# Patient Record
Sex: FEMALE | Race: WHITE | NOT HISPANIC OR LATINO | Employment: FULL TIME | URBAN - METROPOLITAN AREA
[De-identification: names, ages, dates, MRNs, and addresses within clinical notes are randomized per-mention and may not be internally consistent; named-entity substitution may affect disease eponyms.]

---

## 2017-10-09 ENCOUNTER — OFFICE VISIT (OUTPATIENT)
Dept: MEDICAL GROUP | Facility: MEDICAL CENTER | Age: 47
End: 2017-10-09
Payer: COMMERCIAL

## 2017-10-09 VITALS
SYSTOLIC BLOOD PRESSURE: 112 MMHG | WEIGHT: 131 LBS | TEMPERATURE: 98 F | DIASTOLIC BLOOD PRESSURE: 82 MMHG | HEIGHT: 67 IN | RESPIRATION RATE: 16 BRPM | OXYGEN SATURATION: 97 % | HEART RATE: 68 BPM | BODY MASS INDEX: 20.56 KG/M2

## 2017-10-09 DIAGNOSIS — F33.0 MAJOR DEPRESSIVE DISORDER, RECURRENT EPISODE, MILD (HCC): ICD-10-CM

## 2017-10-09 DIAGNOSIS — Z00.00 HEALTH CARE MAINTENANCE: ICD-10-CM

## 2017-10-09 DIAGNOSIS — F41.1 GAD (GENERALIZED ANXIETY DISORDER): ICD-10-CM

## 2017-10-09 DIAGNOSIS — Z12.39 SCREENING FOR MALIGNANT NEOPLASM OF BREAST: ICD-10-CM

## 2017-10-09 PROCEDURE — 99214 OFFICE O/P EST MOD 30 MIN: CPT | Performed by: INTERNAL MEDICINE

## 2017-10-09 RX ORDER — PAROXETINE 10 MG/1
20 TABLET, FILM COATED ORAL DAILY
Qty: 180 TAB | Refills: 0 | Status: SHIPPED | OUTPATIENT
Start: 2017-10-09 | End: 2018-05-10 | Stop reason: SDUPTHER

## 2017-10-09 ASSESSMENT — PATIENT HEALTH QUESTIONNAIRE - PHQ9
5. POOR APPETITE OR OVEREATING: 0 - NOT AT ALL
SUM OF ALL RESPONSES TO PHQ QUESTIONS 1-9: 10
CLINICAL INTERPRETATION OF PHQ2 SCORE: 2
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
## 2017-10-24 ENCOUNTER — APPOINTMENT (OUTPATIENT)
Dept: RADIOLOGY | Facility: IMAGING CENTER | Age: 47
End: 2017-10-24
Attending: NURSE PRACTITIONER
Payer: COMMERCIAL

## 2017-10-24 ENCOUNTER — OFFICE VISIT (OUTPATIENT)
Dept: URGENT CARE | Facility: CLINIC | Age: 47
End: 2017-10-24
Payer: COMMERCIAL

## 2017-10-24 VITALS
HEART RATE: 74 BPM | TEMPERATURE: 97.9 F | DIASTOLIC BLOOD PRESSURE: 72 MMHG | OXYGEN SATURATION: 96 % | BODY MASS INDEX: 20.56 KG/M2 | HEIGHT: 67 IN | SYSTOLIC BLOOD PRESSURE: 106 MMHG | WEIGHT: 131 LBS | RESPIRATION RATE: 16 BRPM

## 2017-10-24 DIAGNOSIS — S39.012A ACUTE MYOFASCIAL STRAIN OF LUMBAR REGION, INITIAL ENCOUNTER: ICD-10-CM

## 2017-10-24 PROCEDURE — 99213 OFFICE O/P EST LOW 20 MIN: CPT | Performed by: NURSE PRACTITIONER

## 2017-10-24 PROCEDURE — 72100 X-RAY EXAM L-S SPINE 2/3 VWS: CPT | Mod: TC | Performed by: NURSE PRACTITIONER

## 2017-10-24 RX ORDER — CYCLOBENZAPRINE HCL 10 MG
10 TABLET ORAL 3 TIMES DAILY PRN
Qty: 30 TAB | Refills: 0 | Status: SHIPPED | OUTPATIENT
Start: 2017-10-24 | End: 2017-11-17

## 2017-10-24 RX ORDER — KETOROLAC TROMETHAMINE 10 MG/1
10 TABLET, FILM COATED ORAL EVERY 6 HOURS PRN
Qty: 20 TAB | Refills: 0 | Status: SHIPPED | OUTPATIENT
Start: 2017-10-24 | End: 2017-11-17

## 2017-10-24 RX ORDER — HYDROCODONE BITARTRATE AND ACETAMINOPHEN 5; 325 MG/1; MG/1
1-2 TABLET ORAL EVERY 6 HOURS PRN
Qty: 20 TAB | Refills: 0 | Status: SHIPPED | OUTPATIENT
Start: 2017-10-24 | End: 2017-11-17

## 2017-10-24 RX ORDER — KETOROLAC TROMETHAMINE 30 MG/ML
60 INJECTION, SOLUTION INTRAMUSCULAR; INTRAVENOUS ONCE
Status: COMPLETED | OUTPATIENT
Start: 2017-10-24 | End: 2017-10-24

## 2017-10-24 RX ADMIN — KETOROLAC TROMETHAMINE 60 MG: 30 INJECTION, SOLUTION INTRAMUSCULAR; INTRAVENOUS at 13:38

## 2017-10-24 ASSESSMENT — ENCOUNTER SYMPTOMS: BACK PAIN: 1

## 2017-10-24 NOTE — LETTER
October 24, 2017    To Whom It May Concern:         This is confirmation that Rachel Jacobs attended her scheduled appointment with Cathey J Hamman, A.P.N. on 10/24/17.          If you have any questions please do not hesitate to call me at the phone number listed below.    Sincerely,          Vickie Kearns, Med Ass't  [phone]
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
## 2017-11-17 ENCOUNTER — HOSPITAL ENCOUNTER (OUTPATIENT)
Dept: LAB | Facility: MEDICAL CENTER | Age: 47
End: 2017-11-17
Attending: PHYSICIAN ASSISTANT
Payer: COMMERCIAL

## 2017-11-17 ENCOUNTER — OFFICE VISIT (OUTPATIENT)
Dept: MEDICAL GROUP | Facility: MEDICAL CENTER | Age: 47
End: 2017-11-17
Payer: COMMERCIAL

## 2017-11-17 VITALS
SYSTOLIC BLOOD PRESSURE: 90 MMHG | TEMPERATURE: 98.4 F | HEART RATE: 77 BPM | BODY MASS INDEX: 20.56 KG/M2 | OXYGEN SATURATION: 97 % | HEIGHT: 67 IN | WEIGHT: 131 LBS | DIASTOLIC BLOOD PRESSURE: 62 MMHG

## 2017-11-17 DIAGNOSIS — I95.9 HYPOTENSION, UNSPECIFIED HYPOTENSION TYPE: ICD-10-CM

## 2017-11-17 LAB
ALBUMIN SERPL BCP-MCNC: 4.3 G/DL (ref 3.2–4.9)
ALBUMIN/GLOB SERPL: 1.6 G/DL
ALP SERPL-CCNC: 81 U/L (ref 30–99)
ALT SERPL-CCNC: 15 U/L (ref 2–50)
ANION GAP SERPL CALC-SCNC: 6 MMOL/L (ref 0–11.9)
AST SERPL-CCNC: 17 U/L (ref 12–45)
BASOPHILS # BLD AUTO: 1.1 % (ref 0–1.8)
BASOPHILS # BLD: 0.08 K/UL (ref 0–0.12)
BILIRUB SERPL-MCNC: 0.9 MG/DL (ref 0.1–1.5)
BUN SERPL-MCNC: 14 MG/DL (ref 8–22)
CALCIUM SERPL-MCNC: 9.3 MG/DL (ref 8.5–10.5)
CHLORIDE SERPL-SCNC: 105 MMOL/L (ref 96–112)
CO2 SERPL-SCNC: 27 MMOL/L (ref 20–33)
CREAT SERPL-MCNC: 0.67 MG/DL (ref 0.5–1.4)
EOSINOPHIL # BLD AUTO: 0.24 K/UL (ref 0–0.51)
EOSINOPHIL NFR BLD: 3.2 % (ref 0–6.9)
ERYTHROCYTE [DISTWIDTH] IN BLOOD BY AUTOMATED COUNT: 40.2 FL (ref 35.9–50)
GFR SERPL CREATININE-BSD FRML MDRD: >60 ML/MIN/1.73 M 2
GLOBULIN SER CALC-MCNC: 2.7 G/DL (ref 1.9–3.5)
GLUCOSE SERPL-MCNC: 88 MG/DL (ref 65–99)
HCT VFR BLD AUTO: 42 % (ref 37–47)
HGB BLD-MCNC: 14 G/DL (ref 12–16)
IMM GRANULOCYTES # BLD AUTO: 0.01 K/UL (ref 0–0.11)
IMM GRANULOCYTES NFR BLD AUTO: 0.1 % (ref 0–0.9)
LYMPHOCYTES # BLD AUTO: 3.03 K/UL (ref 1–4.8)
LYMPHOCYTES NFR BLD: 40.4 % (ref 22–41)
MCH RBC QN AUTO: 31.9 PG (ref 27–33)
MCHC RBC AUTO-ENTMCNC: 33.3 G/DL (ref 33.6–35)
MCV RBC AUTO: 95.7 FL (ref 81.4–97.8)
MONOCYTES # BLD AUTO: 0.4 K/UL (ref 0–0.85)
MONOCYTES NFR BLD AUTO: 5.3 % (ref 0–13.4)
NEUTROPHILS # BLD AUTO: 3.74 K/UL (ref 2–7.15)
NEUTROPHILS NFR BLD: 49.9 % (ref 44–72)
NRBC # BLD AUTO: 0 K/UL
NRBC BLD AUTO-RTO: 0 /100 WBC
PLATELET # BLD AUTO: 226 K/UL (ref 164–446)
PMV BLD AUTO: 10.7 FL (ref 9–12.9)
POTASSIUM SERPL-SCNC: 3.7 MMOL/L (ref 3.6–5.5)
PROT SERPL-MCNC: 7 G/DL (ref 6–8.2)
RBC # BLD AUTO: 4.39 M/UL (ref 4.2–5.4)
SODIUM SERPL-SCNC: 138 MMOL/L (ref 135–145)
TSH SERPL DL<=0.005 MIU/L-ACNC: 1.25 UIU/ML (ref 0.3–3.7)
WBC # BLD AUTO: 7.5 K/UL (ref 4.8–10.8)

## 2017-11-17 PROCEDURE — 80053 COMPREHEN METABOLIC PANEL: CPT

## 2017-11-17 PROCEDURE — 99214 OFFICE O/P EST MOD 30 MIN: CPT | Performed by: PHYSICIAN ASSISTANT

## 2017-11-17 PROCEDURE — 36415 COLL VENOUS BLD VENIPUNCTURE: CPT

## 2017-11-17 PROCEDURE — 84443 ASSAY THYROID STIM HORMONE: CPT

## 2017-11-17 PROCEDURE — 85025 COMPLETE CBC W/AUTO DIFF WBC: CPT

## 2017-11-17 ASSESSMENT — PATIENT HEALTH QUESTIONNAIRE - PHQ9: CLINICAL INTERPRETATION OF PHQ2 SCORE: 0

## 2017-11-17 NOTE — PROGRESS NOTES
"Subjective:   Rachel Jacobs is a 47 y.o. female here today for hypotensive symptoms over the past few weeks.    Hypotension  This is a 47-year-old female complains of a couple week history of feeling lightheaded dizzy with fatigue. She states that symptoms are secondary to her blood pressure being low. States that she has passed out 3 times in the past 2-3 weeks. Denies any blood loss. Denies any rectal bleeding. No vaginal bleeding. Her chart has a history of macrocytosis without anemia. She does smoke but has cut back on smoking. Yesterday developed some chest pain with a cough. Denies fevers. Denies any significant nasal congestion or drainage. States when she gets up from a seated position she is very slow and methodical. Symptoms began she believes after starting Paxil. She took the dose for a little while then bumped it up to 20 mg daily. Medication has been effective for her hot flashes. She states she does have significant sweating of her palms.       Current medicines (including changes today)  Current Outpatient Prescriptions   Medication Sig Dispense Refill   • paroxetine (PAXIL) 10 MG Tab Take 2 Tabs by mouth every day. 180 Tab 0     No current facility-administered medications for this visit.      She  has a past medical history of Depression; Endometriosis; and Macrocytosis (3/2014).    ROS   No chest pain, no shortness of breath, no abdominal pain and all other systems were reviewed and are negative.       Objective:     Blood pressure (!) 90/62, pulse 77, temperature 36.9 °C (98.4 °F), height 1.702 m (5' 7\"), weight 59.4 kg (131 lb), last menstrual period 09/27/2008, SpO2 97 %, not currently breastfeeding. Body mass index is 20.52 kg/m².   Physical Exam:  Constitutional: Alert, no distress.  Skin: Warm, dry, good turgor, no rashes in visible areas.  Eye: Equal, round and reactive, conjunctiva clear, lids normal.  ENMT: Lips without lesions, good dentition, oropharynx clear.  Neck: Trachea " midline, no masses.   Lymph: No cervical or supraclavicular lymphadenopathy  Respiratory: Unlabored respiratory effort, lungs clear to auscultation, no wheezes, no ronchi.  Cardiovascular: Normal S1, S2, no murmur, no edema.  Abdomen: Soft, non-tender, no masses.  Psych: Alert and oriented x3, normal affect and mood.    Orthostatic blood pressure readings with sitting at 98/62. Supine 110/70 and standing 100/78.    Unable to obtain an EKG because it was down.    Assessment and Plan:   The following treatment plan was discussed    1. Hypotension, unspecified hypotension type  Acute, new onset condition. Cut Paxil to 10 mg for 3-5 days and if still symptomatic decreased to 5 mg for a week. Go to ED if has another episode of syncope. Ordered labs to be done today.  - CBC WITH DIFFERENTIAL; Future  - COMP METABOLIC PANEL; Future  - TSH; Future  - EKG - Clinic performed - machine is not functioning      Followup: Return if symptoms worsen or fail to improve.    Please note that this dictation was created using voice recognition software. I have made every reasonable attempt to correct obvious errors, but I expect that there are errors of grammar and possibly content that I did not discover before finalizing the note.

## 2017-11-17 NOTE — ASSESSMENT & PLAN NOTE
This is a 47-year-old female complains of a couple week history of feeling lightheaded dizzy with fatigue. She states that symptoms are secondary to her blood pressure being low. States that she has passed out 3 times in the past 2-3 weeks. Denies any blood loss. Denies any rectal bleeding. No vaginal bleeding. Her chart has a history of macrocytosis without anemia. She does smoke but has cut back on smoking. Yesterday developed some chest pain with a cough. Denies fevers. Denies any significant nasal congestion or drainage. States when she gets up from a seated position she is very slow and methodical. Symptoms began she believes after starting Paxil. She took the dose for a little while then bumped it up to 20 mg daily. Medication has been effective for her hot flashes. She states she does have significant sweating of her palms.

## 2017-11-20 ENCOUNTER — TELEPHONE (OUTPATIENT)
Dept: MEDICAL GROUP | Facility: MEDICAL CENTER | Age: 47
End: 2017-11-20

## 2017-11-27 ENCOUNTER — OFFICE VISIT (OUTPATIENT)
Dept: MEDICAL GROUP | Facility: MEDICAL CENTER | Age: 47
End: 2017-11-27
Payer: COMMERCIAL

## 2017-11-27 VITALS
WEIGHT: 131 LBS | DIASTOLIC BLOOD PRESSURE: 64 MMHG | BODY MASS INDEX: 20.56 KG/M2 | TEMPERATURE: 98.7 F | SYSTOLIC BLOOD PRESSURE: 102 MMHG | HEIGHT: 67 IN | HEART RATE: 68 BPM | OXYGEN SATURATION: 97 %

## 2017-11-27 DIAGNOSIS — F41.1 GAD (GENERALIZED ANXIETY DISORDER): ICD-10-CM

## 2017-11-27 DIAGNOSIS — Z00.00 HEALTH CARE MAINTENANCE: ICD-10-CM

## 2017-11-27 DIAGNOSIS — F33.1 MAJOR DEPRESSIVE DISORDER, RECURRENT EPISODE, MODERATE (HCC): ICD-10-CM

## 2017-11-27 PROCEDURE — 99214 OFFICE O/P EST MOD 30 MIN: CPT | Performed by: INTERNAL MEDICINE

## 2017-11-27 RX ORDER — SERTRALINE HYDROCHLORIDE 100 MG/1
150 TABLET, FILM COATED ORAL DAILY
Qty: 135 TAB | Refills: 0 | Status: SHIPPED | OUTPATIENT
Start: 2017-11-27 | End: 2017-11-27

## 2017-11-27 RX ORDER — SERTRALINE HYDROCHLORIDE 100 MG/1
100 TABLET, FILM COATED ORAL DAILY
Qty: 90 TAB | Refills: 0 | Status: SHIPPED | OUTPATIENT
Start: 2017-11-27 | End: 2018-05-22

## 2017-11-27 ASSESSMENT — PATIENT HEALTH QUESTIONNAIRE - PHQ9
3. TROUBLE FALLING OR STAYING ASLEEP OR SLEEPING TOO MUCH: 2
6. FEELING BAD ABOUT YOURSELF - OR THAT YOU ARE A FAILURE OR HAVE LET YOURSELF OR YOUR FAMILY DOWN: 1
5. POOR APPETITE OR OVEREATING: 0 - NOT AT ALL
7. TROUBLE CONCENTRATING ON THINGS, SUCH AS READING THE NEWSPAPER OR WATCHING TELEVISION: 1
4. FEELING TIRED OR HAVING LITTLE ENERGY: 2
2. FEELING DOWN, DEPRESSED, IRRITABLE, OR HOPELESS: 1
SUM OF ALL RESPONSES TO PHQ QUESTIONS 1-9: 11
SUM OF ALL RESPONSES TO PHQ QUESTIONS 1-9: 9
8. MOVING OR SPEAKING SO SLOWLY THAT OTHER PEOPLE COULD HAVE NOTICED. OR THE OPPOSITE, BEING SO FIGETY OR RESTLESS THAT YOU HAVE BEEN MOVING AROUND A LOT MORE THAN USUAL: 0
5. POOR APPETITE OR OVEREATING: 0
9. THOUGHTS THAT YOU WOULD BE BETTER OFF DEAD, OR OF HURTING YOURSELF: 1
1. LITTLE INTEREST OR PLEASURE IN DOING THINGS: 1
SUM OF ALL RESPONSES TO PHQ9 QUESTIONS 1 AND 2: 2
CLINICAL INTERPRETATION OF PHQ2 SCORE: 3
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
## 2018-05-10 DIAGNOSIS — F33.0 MAJOR DEPRESSIVE DISORDER, RECURRENT EPISODE, MILD (HCC): ICD-10-CM

## 2018-05-10 RX ORDER — PAROXETINE 10 MG/1
20 TABLET, FILM COATED ORAL DAILY
Qty: 60 TAB | Refills: 0 | Status: SHIPPED | OUTPATIENT
Start: 2018-05-10 | End: 2018-05-22

## 2018-05-22 ENCOUNTER — OFFICE VISIT (OUTPATIENT)
Dept: URGENT CARE | Facility: PHYSICIAN GROUP | Age: 48
End: 2018-05-22
Payer: COMMERCIAL

## 2018-05-22 ENCOUNTER — HOSPITAL ENCOUNTER (OUTPATIENT)
Dept: RADIOLOGY | Facility: MEDICAL CENTER | Age: 48
End: 2018-05-22
Attending: PHYSICIAN ASSISTANT
Payer: COMMERCIAL

## 2018-05-22 VITALS
HEART RATE: 71 BPM | SYSTOLIC BLOOD PRESSURE: 96 MMHG | WEIGHT: 127 LBS | TEMPERATURE: 98.6 F | DIASTOLIC BLOOD PRESSURE: 62 MMHG | BODY MASS INDEX: 19.93 KG/M2 | OXYGEN SATURATION: 95 % | HEIGHT: 67 IN

## 2018-05-22 DIAGNOSIS — Z87.442 HISTORY OF NEPHROLITHIASIS: ICD-10-CM

## 2018-05-22 DIAGNOSIS — R10.32 LLQ ABDOMINAL PAIN: ICD-10-CM

## 2018-05-22 DIAGNOSIS — J18.9 PNEUMONIA OF RIGHT LOWER LOBE DUE TO INFECTIOUS ORGANISM: ICD-10-CM

## 2018-05-22 DIAGNOSIS — R31.9 HEMATURIA, UNSPECIFIED TYPE: ICD-10-CM

## 2018-05-22 LAB
APPEARANCE UR: CLEAR
BILIRUB UR STRIP-MCNC: NEGATIVE MG/DL
COLOR UR AUTO: NORMAL
GLUCOSE UR STRIP.AUTO-MCNC: NEGATIVE MG/DL
KETONES UR STRIP.AUTO-MCNC: 40 MG/DL
LEUKOCYTE ESTERASE UR QL STRIP.AUTO: NEGATIVE
NITRITE UR QL STRIP.AUTO: NEGATIVE
PH UR STRIP.AUTO: 6 [PH] (ref 5–8)
PROT UR QL STRIP: NORMAL MG/DL
RBC UR QL AUTO: NORMAL
SP GR UR STRIP.AUTO: 1.02
UROBILINOGEN UR STRIP-MCNC: 0.2 MG/DL

## 2018-05-22 PROCEDURE — 74176 CT ABD & PELVIS W/O CONTRAST: CPT

## 2018-05-22 PROCEDURE — 99214 OFFICE O/P EST MOD 30 MIN: CPT | Performed by: PHYSICIAN ASSISTANT

## 2018-05-22 PROCEDURE — 81002 URINALYSIS NONAUTO W/O SCOPE: CPT | Performed by: PHYSICIAN ASSISTANT

## 2018-05-22 RX ORDER — KETOROLAC TROMETHAMINE 30 MG/ML
60 INJECTION, SOLUTION INTRAMUSCULAR; INTRAVENOUS ONCE
Status: COMPLETED | OUTPATIENT
Start: 2018-05-22 | End: 2018-05-22

## 2018-05-22 RX ORDER — DEXTROMETHORPHAN HYDROBROMIDE AND PROMETHAZINE HYDROCHLORIDE 15; 6.25 MG/5ML; MG/5ML
5 SYRUP ORAL
Qty: 100 ML | Refills: 0 | Status: SHIPPED | OUTPATIENT
Start: 2018-05-22 | End: 2018-05-24

## 2018-05-22 RX ORDER — LEVOFLOXACIN 500 MG/1
500 TABLET, FILM COATED ORAL DAILY
Qty: 10 TAB | Refills: 0 | Status: SHIPPED | OUTPATIENT
Start: 2018-05-22 | End: 2018-05-24

## 2018-05-22 RX ADMIN — KETOROLAC TROMETHAMINE 60 MG: 30 INJECTION, SOLUTION INTRAMUSCULAR; INTRAVENOUS at 13:12

## 2018-05-22 NOTE — LETTER
May 22, 2018         Patient: Rachel Jacobs   YOB: 1970   Date of Visit: 5/22/2018           To Whom it May Concern:    Rachel Jacobs was seen in my clinic on 5/22/2018. Please excuse her from work for the dates of 5/22, 5/23 and 5/24 Returning on 5/25.    If you have any questions or concerns, please don't hesitate to call.        Sincerely,           Annabelle Moran P.A.-C.  Electronically Signed

## 2018-05-23 NOTE — PROGRESS NOTES
Chief Complaint   Patient presents with   • LLQ Pain     Frrequent Urination, back pain, nausea, fever x3 days       HISTORY OF PRESENT ILLNESS: Patient is a 48 y.o. female who presents today for 3 days of generally feeling unwell with associated nausea, fevers up to 101 measured at home but also some LLQ abdominal discomfort and back pain, noted urinary frequency.  Patient has not noticed any hematuria but does note hx of kidney stones and concern for recurrence.   She has not vomited but does have some nausea.   She does not know when last BM was, possibly feeling constipated she states.   No hx of diverticulitis and has not had a colonoscopy before.  Hx of hysterectomy.   Some coughing but feels that her most persistent symptom is abdominal discomfort.     Patient Active Problem List    Diagnosis Date Noted   • Hypotension 11/17/2017   • Tobacco use 03/31/2016   • Macrocytosis without anemia 09/17/2015   • Insomnia 06/11/2015   • History of nephrolithiasis 06/11/2015       Allergies:Pcn [penicillins]    Current Outpatient Prescriptions Ordered in [facility]   Medication Sig Dispense Refill   • levoFLOXacin (LEVAQUIN) 500 MG tablet Take 1 Tab by mouth every day. 10 Tab 0   • promethazine-dextromethorphan (PROMETHAZINE-DM) 6.25-15 MG/5ML syrup Take 5 mL by mouth after meals and at bedtime as needed for Cough. 100 mL 0     No current Epic-ordered facility-administered medications on file.        Past Medical History:   Diagnosis Date   • Depression    • Endometriosis    • Macrocytosis 3/2014       Social History   Substance Use Topics   • Smoking status: Current Every Day Smoker     Packs/day: 0.10     Types: Cigarettes   • Smokeless tobacco: Never Used   • Alcohol use 0.6 oz/week     1 Standard drinks or equivalent per week      Comment: occas       Family Status   Relation Status   • Mother Alive   • Father Other   • Neg Hx      Family History   Problem Relation Age of Onset   • Diabetes Mother    • Allergies Mother   "  • Hypertension Mother    • Hyperlipidemia Mother    • Other Father      unknown   • Heart Disease Neg Hx    • Psychiatry Neg Hx    • Stroke Neg Hx    • Thyroid Neg Hx        ROS:  Review of Systems   Constitutional: SEE HPI  HENT: Negative for ear pain, nosebleeds, congestion, sore throat and neck pain.    Eyes: Negative for blurred vision.   Respiratory: Dry cough without sputum production, shortness of breath and wheezing.    Cardiovascular: Negative for chest pain, palpitations, orthopnea and leg swelling.   Gastrointestinal: SEE HPI  Genitourinary: SEE HPI  All other systems reviewed and are negative.       Exam:  Blood pressure (!) 96/62, pulse 71, temperature 37 °C (98.6 °F), height 1.702 m (5' 7\"), weight 57.6 kg (127 lb), last menstrual period 09/27/2008, SpO2 95 %.  General:  Well nourished, well developed female in NAD however appears uncomfortable. Prefers to lay with heating pad.   Eyes: PERRLA, EOM within normal limits, no conjunctival injection, no scleral icterus, visual fields and acuity grossly intact.  Ears: Normal shape and symmetry, no tenderness, no discharge. External canals are without any significant edema or erythema. Tympanic membranes are without any inflammation, no effusion. Gross auditory acuity is intact  Nose: Symmetrical, sinuses without tenderness, no discharge.   Mouth: reasonable hygiene, no erythema exudates or tonsillar enlargement.  Neck: no masses, range of motion within normal limits, no tracheal deviation. No lymphadenopathy  Pulmonary: Normal respiratory effort, no wheezes, crackles, or rhonchi.  Cardiovascular: regular rate and rhythm without murmurs, rubs, or gallops.  Abdomen: Soft, mild left side TTP (mid to lower) nondistended. Normal bowel sounds. No hepatosplenomegaly or masses, or hernias. No rebound or guarding.  No CVA tenderness.   Skin: No visible rashes or lesion. Warm, pink, dry.   Extremities: no clubbing, cyanosis, or edema.  Neuro: A&O x 3. Speech " normal/clear.  Normal gait.     RAD    Impression         1. Airspace opacity in the right lower lobe, in keeping with pneumonia.    2. Nonobstructive bilateral intrarenal calculi. No ureteral calculus. No hydronephrosis. Duplication of the left collecting system.   Reading Provider Reading Date   Zackery Amin M.D. May 22, 2018     Component Results     Component Value Ref Range & Units Status   POC Color Dark Yellow  Negative Final   POC Appearance Clear  Negative Final   POC Leukocyte Esterase Negative  Negative Final   POC Nitrites Negative  Negative Final   POC Urobiligen 0.2  Negative (0.2) mg/dL Final   POC Protein Trace  Negative mg/dL Final   POC Urine PH 6.0  5.0 - 8.0 Final   POC Blood Large Hemolyzed  Negative Final   POC Specific Gravity 1.025  <1.005 - >1.030 Final   POC Ketones 40  Negative mg/dL Final   POC Bilirubin Negative  Negative mg/dL Final   POC Glucose Negative  Negative mg/dL Final       Assessment/Plan:  1. Pneumonia of right lower lobe due to infectious organism (HCC)  levoFLOXacin (LEVAQUIN) 500 MG tablet    promethazine-dextromethorphan (PROMETHAZINE-DM) 6.25-15 MG/5ML syrup   2. LLQ abdominal pain  POCT Urinalysis    CT-RENAL COLIC EVALUATION(A/P W/O)    ketorolac (TORADOL) injection 60 mg   3. Hematuria, unspecified type  CT-RENAL COLIC EVALUATION(A/P W/O)   4. History of nephrolithiasis  CT-RENAL COLIC EVALUATION(A/P W/O)         -hematuria, otherwise normal U/A.   Hx of nephrolithiasis and left sided abdominal pain prompting CT renal  -CT renal findings as above.  Interestingly patient with + right lower lobe PNA.   Speaking with patient 5 hours after in person visit and completed CT she notes she has started coughing more throughout the day and it has become productive.    -given findings patient will be placed on Levaquin x 10 days.  She is to follow up with her PCP in next few weeks.   RTC if not improving, ER if worsening, high fevers, weakness, etc discussed      Supportive  care, differential diagnoses, and indications for immediate follow-up discussed with patient.   Pathogenesis of diagnosis discussed including typical length and natural progression.   Instructed to return to clinic or nearest emergency department for any change in condition, further concerns, or worsening of symptoms.  Patient states understanding of the plan of care and discharge instructions.      Annabelle Moran P.A.-C.

## 2018-05-24 ENCOUNTER — APPOINTMENT (OUTPATIENT)
Dept: RADIOLOGY | Facility: MEDICAL CENTER | Age: 48
End: 2018-05-24
Attending: EMERGENCY MEDICINE
Payer: COMMERCIAL

## 2018-05-24 ENCOUNTER — HOSPITAL ENCOUNTER (OUTPATIENT)
Facility: MEDICAL CENTER | Age: 48
End: 2018-05-26
Attending: EMERGENCY MEDICINE | Admitting: HOSPITALIST
Payer: COMMERCIAL

## 2018-05-24 DIAGNOSIS — N20.1 URETEROLITHIASIS: ICD-10-CM

## 2018-05-24 DIAGNOSIS — G47.00 INSOMNIA, UNSPECIFIED TYPE: ICD-10-CM

## 2018-05-24 DIAGNOSIS — N20.0 NEPHROLITHIASIS: ICD-10-CM

## 2018-05-24 LAB
ALBUMIN SERPL BCP-MCNC: 4.4 G/DL (ref 3.2–4.9)
ALBUMIN/GLOB SERPL: 1.4 G/DL
ALP SERPL-CCNC: 63 U/L (ref 30–99)
ALT SERPL-CCNC: 15 U/L (ref 2–50)
ANION GAP SERPL CALC-SCNC: 9 MMOL/L (ref 0–11.9)
APPEARANCE UR: ABNORMAL
AST SERPL-CCNC: 29 U/L (ref 12–45)
BACTERIA #/AREA URNS HPF: ABNORMAL /HPF
BASOPHILS # BLD AUTO: 0.9 % (ref 0–1.8)
BASOPHILS # BLD: 0.06 K/UL (ref 0–0.12)
BILIRUB SERPL-MCNC: 1.1 MG/DL (ref 0.1–1.5)
BILIRUB UR QL STRIP.AUTO: NEGATIVE
BUN SERPL-MCNC: 18 MG/DL (ref 8–22)
CALCIUM SERPL-MCNC: 9.1 MG/DL (ref 8.4–10.2)
CHLORIDE SERPL-SCNC: 106 MMOL/L (ref 96–112)
CO2 SERPL-SCNC: 23 MMOL/L (ref 20–33)
COLOR UR: YELLOW
CREAT SERPL-MCNC: 0.85 MG/DL (ref 0.5–1.4)
EOSINOPHIL # BLD AUTO: 0.34 K/UL (ref 0–0.51)
EOSINOPHIL NFR BLD: 5.3 % (ref 0–6.9)
EPI CELLS #/AREA URNS HPF: NEGATIVE /HPF
ERYTHROCYTE [DISTWIDTH] IN BLOOD BY AUTOMATED COUNT: 38.6 FL (ref 35.9–50)
GLOBULIN SER CALC-MCNC: 3.2 G/DL (ref 1.9–3.5)
GLUCOSE SERPL-MCNC: 92 MG/DL (ref 65–99)
GLUCOSE UR STRIP.AUTO-MCNC: NEGATIVE MG/DL
HCG SERPL QL: NEGATIVE
HCT VFR BLD AUTO: 45.6 % (ref 37–47)
HGB BLD-MCNC: 15.9 G/DL (ref 12–16)
IMM GRANULOCYTES # BLD AUTO: 0.01 K/UL (ref 0–0.11)
IMM GRANULOCYTES NFR BLD AUTO: 0.2 % (ref 0–0.9)
KETONES UR STRIP.AUTO-MCNC: NEGATIVE MG/DL
LEUKOCYTE ESTERASE UR QL STRIP.AUTO: NEGATIVE
LIPASE SERPL-CCNC: 38 U/L (ref 7–58)
LYMPHOCYTES # BLD AUTO: 2.65 K/UL (ref 1–4.8)
LYMPHOCYTES NFR BLD: 41.4 % (ref 22–41)
MCH RBC QN AUTO: 32.7 PG (ref 27–33)
MCHC RBC AUTO-ENTMCNC: 34.9 G/DL (ref 33.6–35)
MCV RBC AUTO: 93.8 FL (ref 81.4–97.8)
MICRO URNS: ABNORMAL
MONOCYTES # BLD AUTO: 0.75 K/UL (ref 0–0.85)
MONOCYTES NFR BLD AUTO: 11.7 % (ref 0–13.4)
NEUTROPHILS # BLD AUTO: 2.59 K/UL (ref 2–7.15)
NEUTROPHILS NFR BLD: 40.5 % (ref 44–72)
NITRITE UR QL STRIP.AUTO: NEGATIVE
NRBC # BLD AUTO: 0 K/UL
NRBC BLD-RTO: 0 /100 WBC
PH UR STRIP.AUTO: 6.5 [PH]
PLATELET # BLD AUTO: 189 K/UL (ref 164–446)
PMV BLD AUTO: 11.2 FL (ref 9–12.9)
POTASSIUM SERPL-SCNC: 4.1 MMOL/L (ref 3.6–5.5)
PROT SERPL-MCNC: 7.6 G/DL (ref 6–8.2)
PROT UR QL STRIP: 30 MG/DL
RBC # BLD AUTO: 4.86 M/UL (ref 4.2–5.4)
RBC # URNS HPF: ABNORMAL /HPF
RBC UR QL AUTO: ABNORMAL
SODIUM SERPL-SCNC: 138 MMOL/L (ref 135–145)
SP GR UR STRIP.AUTO: 1.01
WBC # BLD AUTO: 6.4 K/UL (ref 4.8–10.8)
WBC #/AREA URNS HPF: ABNORMAL /HPF

## 2018-05-24 PROCEDURE — 80053 COMPREHEN METABOLIC PANEL: CPT

## 2018-05-24 PROCEDURE — 700102 HCHG RX REV CODE 250 W/ 637 OVERRIDE(OP): Performed by: EMERGENCY MEDICINE

## 2018-05-24 PROCEDURE — 700111 HCHG RX REV CODE 636 W/ 250 OVERRIDE (IP): Performed by: HOSPITALIST

## 2018-05-24 PROCEDURE — 81001 URINALYSIS AUTO W/SCOPE: CPT

## 2018-05-24 PROCEDURE — 700111 HCHG RX REV CODE 636 W/ 250 OVERRIDE (IP)

## 2018-05-24 PROCEDURE — 99285 EMERGENCY DEPT VISIT HI MDM: CPT

## 2018-05-24 PROCEDURE — 74176 CT ABD & PELVIS W/O CONTRAST: CPT

## 2018-05-24 PROCEDURE — 700105 HCHG RX REV CODE 258: Performed by: HOSPITALIST

## 2018-05-24 PROCEDURE — 84703 CHORIONIC GONADOTROPIN ASSAY: CPT

## 2018-05-24 PROCEDURE — A9270 NON-COVERED ITEM OR SERVICE: HCPCS | Performed by: EMERGENCY MEDICINE

## 2018-05-24 PROCEDURE — 96374 THER/PROPH/DIAG INJ IV PUSH: CPT

## 2018-05-24 PROCEDURE — G0378 HOSPITAL OBSERVATION PER HR: HCPCS

## 2018-05-24 PROCEDURE — 96375 TX/PRO/DX INJ NEW DRUG ADDON: CPT

## 2018-05-24 PROCEDURE — 99219 PR INITIAL OBSERVATION CARE,LEVL II: CPT | Performed by: HOSPITALIST

## 2018-05-24 PROCEDURE — 85025 COMPLETE CBC W/AUTO DIFF WBC: CPT

## 2018-05-24 PROCEDURE — 94760 N-INVAS EAR/PLS OXIMETRY 1: CPT

## 2018-05-24 PROCEDURE — 83690 ASSAY OF LIPASE: CPT

## 2018-05-24 PROCEDURE — 36415 COLL VENOUS BLD VENIPUNCTURE: CPT

## 2018-05-24 PROCEDURE — 700111 HCHG RX REV CODE 636 W/ 250 OVERRIDE (IP): Performed by: EMERGENCY MEDICINE

## 2018-05-24 RX ORDER — TAMSULOSIN HYDROCHLORIDE 0.4 MG/1
0.4 CAPSULE ORAL ONCE
Status: COMPLETED | OUTPATIENT
Start: 2018-05-24 | End: 2018-05-24

## 2018-05-24 RX ORDER — AMOXICILLIN 250 MG
2 CAPSULE ORAL 2 TIMES DAILY
Status: DISCONTINUED | OUTPATIENT
Start: 2018-05-24 | End: 2018-05-26 | Stop reason: HOSPADM

## 2018-05-24 RX ORDER — ACETAMINOPHEN 325 MG/1
650 TABLET ORAL EVERY 6 HOURS PRN
Status: DISCONTINUED | OUTPATIENT
Start: 2018-05-24 | End: 2018-05-26 | Stop reason: HOSPADM

## 2018-05-24 RX ORDER — LEVOFLOXACIN 500 MG/1
500 TABLET, FILM COATED ORAL DAILY
Status: ON HOLD | COMMUNITY
Start: 2018-05-22 | End: 2018-05-26

## 2018-05-24 RX ORDER — POLYETHYLENE GLYCOL 3350 17 G/17G
1 POWDER, FOR SOLUTION ORAL
Status: DISCONTINUED | OUTPATIENT
Start: 2018-05-24 | End: 2018-05-26 | Stop reason: HOSPADM

## 2018-05-24 RX ORDER — PROMETHAZINE HYDROCHLORIDE 25 MG/1
12.5-25 TABLET ORAL EVERY 4 HOURS PRN
Status: DISCONTINUED | OUTPATIENT
Start: 2018-05-24 | End: 2018-05-26 | Stop reason: HOSPADM

## 2018-05-24 RX ORDER — KETOROLAC TROMETHAMINE 30 MG/ML
INJECTION, SOLUTION INTRAMUSCULAR; INTRAVENOUS
Status: COMPLETED
Start: 2018-05-24 | End: 2018-05-24

## 2018-05-24 RX ORDER — SODIUM CHLORIDE 9 MG/ML
INJECTION, SOLUTION INTRAVENOUS CONTINUOUS
Status: DISCONTINUED | OUTPATIENT
Start: 2018-05-24 | End: 2018-05-26 | Stop reason: HOSPADM

## 2018-05-24 RX ORDER — ONDANSETRON 4 MG/1
4 TABLET, ORALLY DISINTEGRATING ORAL EVERY 4 HOURS PRN
Status: DISCONTINUED | OUTPATIENT
Start: 2018-05-24 | End: 2018-05-26 | Stop reason: HOSPADM

## 2018-05-24 RX ORDER — BISACODYL 10 MG
10 SUPPOSITORY, RECTAL RECTAL
Status: DISCONTINUED | OUTPATIENT
Start: 2018-05-24 | End: 2018-05-26 | Stop reason: HOSPADM

## 2018-05-24 RX ORDER — ONDANSETRON 2 MG/ML
4 INJECTION INTRAMUSCULAR; INTRAVENOUS EVERY 4 HOURS PRN
Status: DISCONTINUED | OUTPATIENT
Start: 2018-05-24 | End: 2018-05-26 | Stop reason: HOSPADM

## 2018-05-24 RX ORDER — KETOROLAC TROMETHAMINE 30 MG/ML
30 INJECTION, SOLUTION INTRAMUSCULAR; INTRAVENOUS ONCE
Status: COMPLETED | OUTPATIENT
Start: 2018-05-24 | End: 2018-05-24

## 2018-05-24 RX ORDER — PAROXETINE 10 MG/1
10 TABLET, FILM COATED ORAL DAILY
Status: ON HOLD | COMMUNITY
End: 2018-05-26

## 2018-05-24 RX ORDER — MORPHINE SULFATE 4 MG/ML
4 INJECTION, SOLUTION INTRAMUSCULAR; INTRAVENOUS ONCE
Status: COMPLETED | OUTPATIENT
Start: 2018-05-24 | End: 2018-05-24

## 2018-05-24 RX ORDER — OXYCODONE HYDROCHLORIDE 5 MG/1
5 TABLET ORAL
Status: DISCONTINUED | OUTPATIENT
Start: 2018-05-24 | End: 2018-05-24

## 2018-05-24 RX ORDER — DEXTROMETHORPHAN HYDROBROMIDE AND PROMETHAZINE HYDROCHLORIDE 15; 6.25 MG/5ML; MG/5ML
SYRUP ORAL NIGHTLY PRN
COMMUNITY
End: 2018-07-20

## 2018-05-24 RX ORDER — PROMETHAZINE HYDROCHLORIDE 25 MG/1
12.5-25 SUPPOSITORY RECTAL EVERY 4 HOURS PRN
Status: DISCONTINUED | OUTPATIENT
Start: 2018-05-24 | End: 2018-05-26 | Stop reason: HOSPADM

## 2018-05-24 RX ORDER — PAROXETINE 10 MG/1
10 TABLET, FILM COATED ORAL DAILY
Status: DISCONTINUED | OUTPATIENT
Start: 2018-05-25 | End: 2018-05-26 | Stop reason: HOSPADM

## 2018-05-24 RX ORDER — OXYCODONE HYDROCHLORIDE 5 MG/1
2.5 TABLET ORAL
Status: DISCONTINUED | OUTPATIENT
Start: 2018-05-24 | End: 2018-05-24

## 2018-05-24 RX ORDER — HYDROMORPHONE HYDROCHLORIDE 2 MG/ML
0.25 INJECTION, SOLUTION INTRAMUSCULAR; INTRAVENOUS; SUBCUTANEOUS
Status: DISCONTINUED | OUTPATIENT
Start: 2018-05-24 | End: 2018-05-25

## 2018-05-24 RX ADMIN — KETOROLAC TROMETHAMINE 30 MG: 30 INJECTION, SOLUTION INTRAMUSCULAR; INTRAVENOUS at 14:57

## 2018-05-24 RX ADMIN — TAMSULOSIN HYDROCHLORIDE 0.4 MG: 0.4 CAPSULE ORAL at 18:56

## 2018-05-24 RX ADMIN — KETOROLAC TROMETHAMINE 30 MG: 30 INJECTION, SOLUTION INTRAMUSCULAR at 14:57

## 2018-05-24 RX ADMIN — HYDROMORPHONE HYDROCHLORIDE 0.25 MG: 2 INJECTION INTRAMUSCULAR; INTRAVENOUS; SUBCUTANEOUS at 20:47

## 2018-05-24 RX ADMIN — SODIUM CHLORIDE: 9 INJECTION, SOLUTION INTRAVENOUS at 20:11

## 2018-05-24 RX ADMIN — MORPHINE SULFATE 4 MG: 4 INJECTION INTRAVENOUS at 16:21

## 2018-05-24 ASSESSMENT — LIFESTYLE VARIABLES
AVERAGE NUMBER OF DAYS PER WEEK YOU HAVE A DRINK CONTAINING ALCOHOL: 1
DO YOU DRINK ALCOHOL: YES
EVER FELT BAD OR GUILTY ABOUT YOUR DRINKING: NO
TOTAL SCORE: 0
ON A TYPICAL DAY WHEN YOU DRINK ALCOHOL HOW MANY DRINKS DO YOU HAVE: 0
HOW MANY TIMES IN THE PAST YEAR HAVE YOU HAD 5 OR MORE DRINKS IN A DAY: 0
EVER_SMOKED: YES
CONSUMPTION TOTAL: NEGATIVE
HAVE YOU EVER FELT YOU SHOULD CUT DOWN ON YOUR DRINKING: NO
TOTAL SCORE: 0
EVER_SMOKED: YES
TOTAL SCORE: 0
EVER HAD A DRINK FIRST THING IN THE MORNING TO STEADY YOUR NERVES TO GET RID OF A HANGOVER: NO
HAVE PEOPLE ANNOYED YOU BY CRITICIZING YOUR DRINKING: NO

## 2018-05-24 ASSESSMENT — PAIN SCALES - GENERAL
PAINLEVEL_OUTOF10: 0
PAINLEVEL_OUTOF10: 8
PAINLEVEL_OUTOF10: 9
PAINLEVEL_OUTOF10: 5
PAINLEVEL_OUTOF10: 8

## 2018-05-24 ASSESSMENT — ENCOUNTER SYMPTOMS
HEADACHES: 0
DIARRHEA: 0
FALLS: 0
LOSS OF CONSCIOUSNESS: 0
CHILLS: 0
ABDOMINAL PAIN: 1
PALPITATIONS: 0
DIZZINESS: 0
FLANK PAIN: 1
SHORTNESS OF BREATH: 0
INSOMNIA: 1
FEVER: 1
VOMITING: 0
NAUSEA: 1

## 2018-05-24 ASSESSMENT — COPD QUESTIONNAIRES
DURING THE PAST 4 WEEKS HOW MUCH DID YOU FEEL SHORT OF BREATH: NONE/LITTLE OF THE TIME
HAVE YOU SMOKED AT LEAST 100 CIGARETTES IN YOUR ENTIRE LIFE: YES
COPD SCREENING SCORE: 3
DO YOU EVER COUGH UP ANY MUCUS OR PHLEGM?: NO/ONLY WITH OCCASIONAL COLDS OR INFECTIONS

## 2018-05-24 NOTE — ED NOTES
Pt requested a distraction (TV) ED tech in room attempting to get the TV to work.  Updated on timeline. Still painful.

## 2018-05-24 NOTE — ED NOTES
Pt back from ct , Pt medicated as directed by ER md, poc update given to pt. Further orders and dispo pending at this time. No further questions from pt at this time

## 2018-05-24 NOTE — ED NOTES
Med rec complete per patient  Allergies reviewed    Patient started Levaquin 500 mg daily on 5-22-18

## 2018-05-24 NOTE — ED NOTES
Rounded on patient.  Oriented to the room and call button. Patient assessed, chart reviewed. Call light within reach, bed in lowest position, and oriented to room. Patient updated on plan of care, painful. Lined and lab, urine collected. Will cont to monitor.

## 2018-05-25 ENCOUNTER — APPOINTMENT (OUTPATIENT)
Dept: RADIOLOGY | Facility: MEDICAL CENTER | Age: 48
End: 2018-05-25
Attending: UROLOGY
Payer: COMMERCIAL

## 2018-05-25 PROBLEM — R11.2 NAUSEA AND VOMITING: Status: ACTIVE | Noted: 2018-05-25

## 2018-05-25 LAB
ANION GAP SERPL CALC-SCNC: 6 MMOL/L (ref 0–11.9)
BUN SERPL-MCNC: 22 MG/DL (ref 8–22)
CALCIUM SERPL-MCNC: 8.3 MG/DL (ref 8.4–10.2)
CHLORIDE SERPL-SCNC: 107 MMOL/L (ref 96–112)
CO2 SERPL-SCNC: 28 MMOL/L (ref 20–33)
CREAT SERPL-MCNC: 0.94 MG/DL (ref 0.5–1.4)
ERYTHROCYTE [DISTWIDTH] IN BLOOD BY AUTOMATED COUNT: 40 FL (ref 35.9–50)
GLUCOSE SERPL-MCNC: 104 MG/DL (ref 65–99)
HCT VFR BLD AUTO: 40.3 % (ref 37–47)
HGB BLD-MCNC: 13.3 G/DL (ref 12–16)
MCH RBC QN AUTO: 31.8 PG (ref 27–33)
MCHC RBC AUTO-ENTMCNC: 33 G/DL (ref 33.6–35)
MCV RBC AUTO: 96.4 FL (ref 81.4–97.8)
PLATELET # BLD AUTO: 165 K/UL (ref 164–446)
PMV BLD AUTO: 10.7 FL (ref 9–12.9)
POTASSIUM SERPL-SCNC: 3.3 MMOL/L (ref 3.6–5.5)
RBC # BLD AUTO: 4.18 M/UL (ref 4.2–5.4)
SODIUM SERPL-SCNC: 141 MMOL/L (ref 135–145)
WBC # BLD AUTO: 5.4 K/UL (ref 4.8–10.8)

## 2018-05-25 PROCEDURE — A9270 NON-COVERED ITEM OR SERVICE: HCPCS | Performed by: STUDENT IN AN ORGANIZED HEALTH CARE EDUCATION/TRAINING PROGRAM

## 2018-05-25 PROCEDURE — 160009 HCHG ANES TIME/MIN: Performed by: UROLOGY

## 2018-05-25 PROCEDURE — C1758 CATHETER, URETERAL: HCPCS | Performed by: UROLOGY

## 2018-05-25 PROCEDURE — 85027 COMPLETE CBC AUTOMATED: CPT

## 2018-05-25 PROCEDURE — 700111 HCHG RX REV CODE 636 W/ 250 OVERRIDE (IP): Performed by: HOSPITALIST

## 2018-05-25 PROCEDURE — 700111 HCHG RX REV CODE 636 W/ 250 OVERRIDE (IP)

## 2018-05-25 PROCEDURE — A9270 NON-COVERED ITEM OR SERVICE: HCPCS | Performed by: HOSPITALIST

## 2018-05-25 PROCEDURE — 700105 HCHG RX REV CODE 258: Performed by: HOSPITALIST

## 2018-05-25 PROCEDURE — 700102 HCHG RX REV CODE 250 W/ 637 OVERRIDE(OP)

## 2018-05-25 PROCEDURE — 501330 HCHG SET, CYSTO IRRIG TUBING: Performed by: UROLOGY

## 2018-05-25 PROCEDURE — 500892 HCHG PACK, PERI-GYN: Performed by: UROLOGY

## 2018-05-25 PROCEDURE — 160035 HCHG PACU - 1ST 60 MINS PHASE I: Performed by: UROLOGY

## 2018-05-25 PROCEDURE — 700102 HCHG RX REV CODE 250 W/ 637 OVERRIDE(OP): Performed by: STUDENT IN AN ORGANIZED HEALTH CARE EDUCATION/TRAINING PROGRAM

## 2018-05-25 PROCEDURE — A9270 NON-COVERED ITEM OR SERVICE: HCPCS

## 2018-05-25 PROCEDURE — 160028 HCHG SURGERY MINUTES - 1ST 30 MINS LEVEL 3: Performed by: UROLOGY

## 2018-05-25 PROCEDURE — G0378 HOSPITAL OBSERVATION PER HR: HCPCS

## 2018-05-25 PROCEDURE — C1769 GUIDE WIRE: HCPCS | Performed by: UROLOGY

## 2018-05-25 PROCEDURE — 160039 HCHG SURGERY MINUTES - EA ADDL 1 MIN LEVEL 3: Performed by: UROLOGY

## 2018-05-25 PROCEDURE — 99225 PR SUBSEQUENT OBSERVATION CARE,LEVEL II: CPT | Performed by: HOSPITALIST

## 2018-05-25 PROCEDURE — 700101 HCHG RX REV CODE 250

## 2018-05-25 PROCEDURE — 96376 TX/PRO/DX INJ SAME DRUG ADON: CPT

## 2018-05-25 PROCEDURE — 36415 COLL VENOUS BLD VENIPUNCTURE: CPT

## 2018-05-25 PROCEDURE — 96375 TX/PRO/DX INJ NEW DRUG ADDON: CPT

## 2018-05-25 PROCEDURE — 80048 BASIC METABOLIC PNL TOTAL CA: CPT

## 2018-05-25 PROCEDURE — 74018 RADEX ABDOMEN 1 VIEW: CPT

## 2018-05-25 PROCEDURE — 160002 HCHG RECOVERY MINUTES (STAT): Performed by: UROLOGY

## 2018-05-25 PROCEDURE — C1726 CATH, BAL DIL, NON-VASCULAR: HCPCS | Performed by: UROLOGY

## 2018-05-25 PROCEDURE — 700102 HCHG RX REV CODE 250 W/ 637 OVERRIDE(OP): Performed by: HOSPITALIST

## 2018-05-25 PROCEDURE — C2617 STENT, NON-COR, TEM W/O DEL: HCPCS | Performed by: UROLOGY

## 2018-05-25 PROCEDURE — 501329 HCHG SET, CYSTO IRRIG Y TUR: Performed by: UROLOGY

## 2018-05-25 PROCEDURE — 160048 HCHG OR STATISTICAL LEVEL 1-5: Performed by: UROLOGY

## 2018-05-25 PROCEDURE — 160036 HCHG PACU - EA ADDL 30 MINS PHASE I: Performed by: UROLOGY

## 2018-05-25 PROCEDURE — 700117 HCHG RX CONTRAST REV CODE 255

## 2018-05-25 DEVICE — STENT UROLOGICAL POLARIS 6X24  ULTRA: Type: IMPLANTABLE DEVICE | Site: URETER | Status: FUNCTIONAL

## 2018-05-25 RX ORDER — HYDROMORPHONE HYDROCHLORIDE 2 MG/ML
0.25 INJECTION, SOLUTION INTRAMUSCULAR; INTRAVENOUS; SUBCUTANEOUS
Status: DISCONTINUED | OUTPATIENT
Start: 2018-05-25 | End: 2018-05-26 | Stop reason: HOSPADM

## 2018-05-25 RX ORDER — OXYCODONE HCL 5 MG/5 ML
SOLUTION, ORAL ORAL
Status: COMPLETED
Start: 2018-05-25 | End: 2018-05-25

## 2018-05-25 RX ORDER — ONDANSETRON 2 MG/ML
INJECTION INTRAMUSCULAR; INTRAVENOUS
Status: COMPLETED
Start: 2018-05-25 | End: 2018-05-25

## 2018-05-25 RX ORDER — ZOLPIDEM TARTRATE 5 MG/1
5 TABLET ORAL NIGHTLY PRN
Status: DISCONTINUED | OUTPATIENT
Start: 2018-05-25 | End: 2018-05-26 | Stop reason: HOSPADM

## 2018-05-25 RX ADMIN — FENTANYL CITRATE 25 MCG: 50 INJECTION, SOLUTION INTRAMUSCULAR; INTRAVENOUS at 15:37

## 2018-05-25 RX ADMIN — ONDANSETRON 4 MG: 2 INJECTION INTRAMUSCULAR; INTRAVENOUS at 15:23

## 2018-05-25 RX ADMIN — SODIUM CHLORIDE: 9 INJECTION, SOLUTION INTRAVENOUS at 06:16

## 2018-05-25 RX ADMIN — HYDROMORPHONE HYDROCHLORIDE 0.25 MG: 2 INJECTION INTRAMUSCULAR; INTRAVENOUS; SUBCUTANEOUS at 01:54

## 2018-05-25 RX ADMIN — HYDROMORPHONE HYDROCHLORIDE 0.25 MG: 2 INJECTION INTRAMUSCULAR; INTRAVENOUS; SUBCUTANEOUS at 09:41

## 2018-05-25 RX ADMIN — OXYCODONE HYDROCHLORIDE 5 MG: 5 SOLUTION ORAL at 15:37

## 2018-05-25 RX ADMIN — ONDANSETRON 4 MG: 2 INJECTION INTRAMUSCULAR; INTRAVENOUS at 23:43

## 2018-05-25 RX ADMIN — SODIUM CHLORIDE: 9 INJECTION, SOLUTION INTRAVENOUS at 22:08

## 2018-05-25 RX ADMIN — STANDARDIZED SENNA CONCENTRATE AND DOCUSATE SODIUM 2 TABLET: 8.6; 5 TABLET, FILM COATED ORAL at 21:40

## 2018-05-25 RX ADMIN — HYDROMORPHONE HYDROCHLORIDE 0.25 MG: 2 INJECTION INTRAMUSCULAR; INTRAVENOUS; SUBCUTANEOUS at 18:50

## 2018-05-25 RX ADMIN — ZOLPIDEM TARTRATE 5 MG: 5 TABLET, FILM COATED ORAL at 23:43

## 2018-05-25 RX ADMIN — HYDROMORPHONE HYDROCHLORIDE 0.25 MG: 2 INJECTION INTRAMUSCULAR; INTRAVENOUS; SUBCUTANEOUS at 22:03

## 2018-05-25 RX ADMIN — FENTANYL CITRATE 25 MCG: 50 INJECTION, SOLUTION INTRAMUSCULAR; INTRAVENOUS at 15:47

## 2018-05-25 RX ADMIN — ZOLPIDEM TARTRATE 5 MG: 5 TABLET, FILM COATED ORAL at 00:58

## 2018-05-25 ASSESSMENT — ENCOUNTER SYMPTOMS
BACK PAIN: 1
DEPRESSION: 0
TINGLING: 0
SHORTNESS OF BREATH: 0
FEVER: 0
NAUSEA: 1
HEARTBURN: 0
NERVOUS/ANXIOUS: 1
BLURRED VISION: 0
MYALGIAS: 0
ABDOMINAL PAIN: 0
COUGH: 0
VOMITING: 0
DIZZINESS: 0
FLANK PAIN: 1
WEAKNESS: 1

## 2018-05-25 ASSESSMENT — PAIN SCALES - GENERAL
PAINLEVEL_OUTOF10: 0
PAINLEVEL_OUTOF10: 7
PAINLEVEL_OUTOF10: 6
PAINLEVEL_OUTOF10: 0
PAINLEVEL_OUTOF10: 7
PAINLEVEL_OUTOF10: 0

## 2018-05-25 ASSESSMENT — PATIENT HEALTH QUESTIONNAIRE - PHQ9
SUM OF ALL RESPONSES TO PHQ9 QUESTIONS 1 AND 2: 0
2. FEELING DOWN, DEPRESSED, IRRITABLE, OR HOPELESS: NOT AT ALL
1. LITTLE INTEREST OR PLEASURE IN DOING THINGS: NOT AT ALL

## 2018-05-25 NOTE — ASSESSMENT & PLAN NOTE
H/o of nephrolithiasis, now with multiple stones seen on CT. She had just seen Urgent Care the day prior and was started on levaquin. Mild hydronephrosis. Urinalysis negative for nitrite or leuk esterase., no white count, afebrile . Hold off on abx  - urology following, greatly appreciate  - continue with IVF  - pain control    CT:  Left lower pole calyceal calculus has transited into the proximal ureter causing mild hydronephrosis  No other change  2 mm right lower pole calculus is nonobstructive  Bilateral parapelvic and cortical renal cysts    Scheduled for left CULTS today with Dr Dominguez

## 2018-05-25 NOTE — OP REPORT
DATE OF SERVICE:  05/25/2018    INDICATION FOR PROCEDURE:  Patient has intractable pain from a stone in the   left upper pole moiety ureter and has opted given the treatment benefits and   options for ureteroscopy to remove the stone with laser lithotripsy and   possible stenting.  We also have decided to go up into the kidney and get the   other small stone that is present.  She understands the risks and benefits as   detailed to her in the preoperative H and P and desires to proceed with her    at the bedside.  She has no infection in her urinary tract.    PREOPERATIVE DIAGNOSIS:  Left upper pole moiety with proximal ureteral stone   4x6 and a left lower pole 3-4 mm stone in the lower pole moiety of a   completely duplicated system.    POSTOPERATIVE DIAGNOSIS:  Complete duplicate system.  Left upper ureter and   moiety are free of stone.  The patient may have passed the stone as we could   not see stone in the 5 calices that are identified there and the entire ureter   as well as ureteral narrowing in the left lower pole ureteral moiety   preventing us to get up to the kidney.    PROCEDURE PERFORMED:  Cystoscopy, bilateral ureteral dilation with passport   12-Guyanese of complete duplicated left ureters or 2 separate complete ureters.    Bilateral stent placement in the moieties on the left side.  Bilateral   retrogrades of the complete duplicated upper and lower pole ureters and   collecting systems and bilateral ureteroscopy of both the left upper pole   complete duplication and the left lower pole complete duplication of ureters.    SURGEON:  Blaise Dominguez MD    ANESTHESIOLOGIST:  Mitzi Graves MD    SPECIMEN:  None.    FINDINGS:  Left upper pole moiety is completely duplicated and free of stone.    She had preoperative hydronephrosis.  Thus, the proximal stone was in the   ureter, but it seems to just past the stone on inspection, we could not find   the stone.  The left lower pole has a  narrowing or stricture.  The distal   portion was refractory to 12-French passport dilation, which was the largest   available dilator here in [facility] and thus a 6x24 stent was placed to   help dilate this ureter and for subsequent manipulation as needed.    COMPLICATIONS:  None.    DESCRIPTION OF PROCEDURE:  Procedure was carried out in the following fashion.    After the patient was properly identified, the labs reviewed, consent   verified, and H and P updated.  Plan discussed with the patient and the   operative team.  The patient expressed verbal understanding of the procedure   and desired to proceed and had no further questions.  The patient was then   taken to the operative theater and placed in supine position where general   anesthesia was introduced.  She was then moved to a dorsal lithotomy position,   care being taken to pad all pressure points and avoid any perturbations of   joints that may cause injury and this was maintained throughout the procedure.    We then prepped and draped her in usual sterile fashion.  Surgical time-out   was done, there were no issues.  Antibiotics were given.  All in the room   agreed to proceed.  Proper patient, site, and procedure were identified and   all was well with the time-out.  The procedure was then begun with the   cystoscopy.  There was no urethral stricture.  There were no bladder masses.    There was 2 UOs on the left side, which subsequently showed complete   duplicated system.  Looking at films, the upper pole moiety was obstructed and   there was a left lower pole moiety, but the stone is not obstructed.  Thus,   we attempted to go up the lateral ureter to the lower pole.  We could not   place the 10-French ureteral dilator over a wire.  The wire was passed.  We   could not dilate and we used the passport, but then we could not pass a   stricture in the left lower pole moiety ureter.  There was probably 1 cm and   probably like 6-French.  We could  not get neither the rigid scope nor the   flexible scope past this and we had no bigger dilators, so we can dilated   with.  Thus, we placed a stent to help dilate this for future manipulation and   possible stone retrieval.  We then turned our attention to the upper pole   moiety.  We placed a wire and then attempted to dilate with a 10-Azerbaijani open   ended dual lumen.  This would not pass.  We then placed the passport and   dilated.  We were able to get the semirigid through.  Further dilating the UO   and expecting the ureter in about 80% of it, we did not see a stone.  We   assumed the stone had flushed back into the kidney.  Thus, we put in the   flexible scope and went up and looked at all the calyces there.  We could not   find the stone.  We did a retrograde and did not see a filling defect.  We   then looked the ureter all the way out and did not see a stone.  Thus,   assumption is the stone has passed.  Because of the dilation and the edema   that was seen along the ureter and some mucosal petechiae almost the length of   the ureter, we decided to leave a stent on that moiety as well.  This was   done over a wire using fluoroscopy into good position above and below.  The   bladder was then emptied and the procedure terminated.  Patient tolerated the   procedure well and was taken to PACU in stable condition.       ____________________________________     MD JOSHUA Desai / LIZZY    DD:  05/25/2018 15:02:18  DT:  05/25/2018 15:48:02    D#:  [id number]  Job#:  [id number]

## 2018-05-25 NOTE — OR SURGEON
Immediate Post OP Note    PreOp Diagnosis: LEft upper pole moeity has prox. Ureteral stone and left lower pole moeity had small stone in Lower pole    PostOp Diagnosis: complete duplexsystem: LEFT upper ureter and moeity free of stone. Patient has passed in and left Lower pole we could ne access due to distal ureteral narrowing that was refractory to dilation.    Procedure(s):  CYSTOSCOPY   Bilateral ureteral dilation with passport of complete duplication on left ( 2 separate ureters)  Bilateral STENT placement  Bilateral retrogrades of upper and lower pole moieties complete separate duplication  Bilateral ureteroscopy in left upper pole complete duplication and left lower pole complete duplication        Surgeon(s):  Blaise Dominguez M.D.    Anesthesiologist/Type of Anesthesia:  Anesthesiologist: Mitzi Graves M.D.  Anesthesia Technician: Viviana Castellano/* No anesthesia type entered *    Surgical Staff:  Circulator: Diann Mix R.N.  Relief Circulator: Mayda Foster R.N.  Scrub Person: Laureano Vaz    Specimens removed if any:  none    Estimated Blood Loss: 1 cc    Findings: left  upper pole moeity is completely duplicated and is free of stone. She had preop hydro, thus the prox stone was in the ureter, but she has passed the stone. Left lower pole ureter has strx in distal portion, dilated with 12 F Passport ( the largest available dilator and 6 x 24 Congolese stent placed.     Complications: none    [id number]            5/25/2018 2:46 PM Blaise Dominguez M.D.

## 2018-05-25 NOTE — OR NURSING
1453 Pt to PACU from OR via hospital bed, respirations spontaneous and non-labored via OPA. OPA D/C'd on arrival. Uretal surgical sites clean, dry and intact, pt arousable on calling with complaints of pain or nausea.  1505 Pt calm resting with eyes closed, VSS   1520 Pt reports nausea, plan to medicate.   1523 IV antiemetic administered.   1525 Pt placed on bedpan per request.   1529 Pt voided successfully. Bedpan removed.  1535 Pt tearful reports 7/10 cramping pain, reports nausea improved, plan to medicate.      1537 Oral and IV analgesia administered.   1547 Pt reports pain persists IV analgesia administered.   1550 No changes, VSS.   1600 Heat packs applied for comfort.   1605 Pt calm resting, VSS. Reports pain tolerable able to rest.  1620 Pt meets criteria for transfer to hospital room.   1626 Report to DAMIEN Cardenas  1630 Pt transported to hospital room.

## 2018-05-25 NOTE — ED NOTES
Report to Liana QUEZADA. Pt transported to 210 bed 2 on [facility] with all belongings by ED tech.

## 2018-05-25 NOTE — ED NOTES
Pt in bed, states pain is tolerable at this time and has no needs. Pt aware we are waiting for bed upstairs.

## 2018-05-25 NOTE — PROGRESS NOTES
"Urology Progress Note    Overnight Events: None    S: No fevers, chills, nausea or vomiting.  Scheduled for left CULTS today with Dr Dominguez.    O:   Blood pressure (!) 99/57, pulse (!) 59, temperature 36.6 °C (97.9 °F), resp. rate 18, height 1.702 m (5' 7\"), weight 57 kg (125 lb 10.6 oz), last menstrual period 09/27/2008, SpO2 93 %, not currently breastfeeding.  Recent Labs      05/24/18   1354  05/25/18   0416   SODIUM  138  141   POTASSIUM  4.1  3.3*   CHLORIDE  106  107   CO2  23  28   GLUCOSE  92  104*   BUN  18  22   CREATININE  0.85  0.94   CALCIUM  9.1  8.3*     Recent Labs      05/24/18   1354  05/25/18   0416   WBC  6.4  5.4   RBC  4.86  4.18*   HEMOGLOBIN  15.9  13.3   HEMATOCRIT  45.6  40.3   MCV  93.8  96.4   MCH  32.7  31.8   MCHC  34.9  33.0*   RDW  38.6  40.0   PLATELETCT  189  165   MPV  11.2  10.7         Intake/Output Summary (Last 24 hours) at 05/25/18 0853  Last data filed at 05/25/18 0600   Gross per 24 hour   Intake             1170 ml   Output                0 ml   Net             1170 ml       Exam:  Abdomen soft, benign.       A/P:    Active Hospital Problems    Diagnosis   • Nephrolithiasis [N20.0]     Priority: High   • Tobacco use [Z72.0]   • Depression [F32.9]     Patient on viibryd           PLAN:  1.  Pain control  2.  Continue NPO for surgery this afternoon  3.  Consent for surgery.  "

## 2018-05-25 NOTE — PROGRESS NOTES
[facility]    Date of Service: 2018    Chief Complaint  48 y.o. female admitted 2018 with kidney stones and pain    Interval Problem Update  Patient upset because her urologist came to see her this morning and asked her if she has hx of kidney stones, patient continues to have flank pain, nausea, vomiting is controlled.     Consultants/Specialty  urology    Disposition  tbd        Review of Systems   Constitutional: Positive for malaise/fatigue. Negative for fever.   HENT: Negative for congestion and hearing loss.    Eyes: Negative for blurred vision.   Respiratory: Negative for cough and shortness of breath.    Cardiovascular: Negative for chest pain and leg swelling.   Gastrointestinal: Positive for nausea. Negative for abdominal pain, heartburn and vomiting.   Genitourinary: Positive for flank pain and hematuria.   Musculoskeletal: Positive for back pain. Negative for myalgias.   Neurological: Positive for weakness. Negative for dizziness and tingling.   Psychiatric/Behavioral: Negative for depression and suicidal ideas. The patient is nervous/anxious.       Physical Exam  Laboratory/Imaging   Hemodynamics  Temp (24hrs), Av.7 °C (98 °F), Min:36.6 °C (97.9 °F), Max:36.7 °C (98.1 °F)   Temperature: 36.6 °C (97.9 °F)  Pulse  Av.7  Min: 55  Max: 99    Blood Pressure: (!) 99/57, NIBP: (!) 94/58      Respiratory      Respiration: 18, Pulse Oximetry: 93 %, O2 Daily Delivery Respiratory : Room Air with O2 Available        RUL Breath Sounds: Clear, RML Breath Sounds: Clear, RLL Breath Sounds: Diminished, MOE Breath Sounds: Clear, LLL Breath Sounds: Diminished    Fluids    Intake/Output Summary (Last 24 hours) at 18 1118  Last data filed at 18 0600   Gross per 24 hour   Intake             1170 ml   Output                0 ml   Net             1170 ml       Nutrition  Orders Placed This Encounter   Procedures   • Diet NPO     Standing Status:   Standing     Number of  Occurrences:   8     Order Specific Question:   Restrict to:     Answer:   Sips with Medications [3]     Physical Exam   Constitutional: She is oriented to person, place, and time. She appears well-developed and well-nourished.   HENT:   Head: Normocephalic and atraumatic.   Mouth/Throat: Oropharynx is clear and moist.   Eyes: Pupils are equal, round, and reactive to light. No scleral icterus.   Neck: Normal range of motion. Neck supple. No JVD present. No thyromegaly present.   Cardiovascular: Normal rate, regular rhythm and normal heart sounds.    No murmur heard.  Pulmonary/Chest: Effort normal and breath sounds normal. No respiratory distress. She has no wheezes.   Abdominal: Soft. Bowel sounds are normal. She exhibits no distension. There is tenderness.   Musculoskeletal: Normal range of motion. She exhibits no edema or tenderness.   Lymphadenopathy:     She has no cervical adenopathy.   Neurological: She is alert and oriented to person, place, and time. She exhibits normal muscle tone.   Skin: Skin is warm and dry. No rash noted. No erythema.   Psychiatric: She has a normal mood and affect.       Recent Labs      05/24/18   1354  05/25/18   0416   WBC  6.4  5.4   RBC  4.86  4.18*   HEMOGLOBIN  15.9  13.3   HEMATOCRIT  45.6  40.3   MCV  93.8  96.4   MCH  32.7  31.8   MCHC  34.9  33.0*   RDW  38.6  40.0   PLATELETCT  189  165   MPV  11.2  10.7     Recent Labs      05/24/18   1354  05/25/18   0416   SODIUM  138  141   POTASSIUM  4.1  3.3*   CHLORIDE  106  107   CO2  23  28   GLUCOSE  92  104*   BUN  18  22   CREATININE  0.85  0.94   CALCIUM  9.1  8.3*                      Assessment/Plan     * Nephrolithiasis- (present on admission)   Assessment & Plan    H/o of nephrolithiasis, now with multiple stones seen on CT. She had just seen Urgent Care the day prior and was started on levaquin. Mild hydronephrosis. Urinalysis negative for nitrite or leuk esterase., no white count, afebrile . Hold off on abx  - urology  following, greatly appreciate  - continue with IVF  - pain control    CT:  Left lower pole calyceal calculus has transited into the proximal ureter causing mild hydronephrosis  No other change  2 mm right lower pole calculus is nonobstructive  Bilateral parapelvic and cortical renal cysts    Scheduled for left CULTS today with Dr Dominguez        Nausea and vomiting   Assessment & Plan    Likely 2/2 kidney stones and pain  Antiemetics  ivf   Pain control  monitor        Hypotension- (present on admission)   Assessment & Plan    Likely from dehydration from nausea and vomiting,  Increase IVF rate  OR today  monitor        Tobacco use- (present on admission)   Assessment & Plan    Continues to smoke.   - declines nicotine patch and gum  - continued counseling        Depression- (present on admission)   Assessment & Plan    Stable.   - continue paroxetine          Quality-Core Measures   Reviewed items::  Medications reviewed, Labs reviewed and Radiology images reviewed  Bowen catheter::  No Bowen      I spent a total of 40 minutes of time during this clinical encounter of which > 50% was devoted to counseling and coordinating care including review of records, pertinent lab data and studies, as well as discussing diagnostic evaluation and work up, planned therapeutic interventions and future disposition of care. Where indicated, the assessment and plan reflect discussion of patient with consultants, other healthcare providers, family members, and additional research needed to obtain further information in formulating the plan of care of this patient. This time includes no procedures or overlap with other providers.

## 2018-05-25 NOTE — H&P
" Hospital Medicine History and Physical    Date of Service  5/24/2018    Chief Complaint  Chief Complaint   Patient presents with   • Flank Pain       History of Presenting Illness  48 y.o. female who presented 5/24/2018 with flank pain. She had noted pain that started as a generalized abdominal pain but then woke up 2/2 sharp pain in the left pelvis and flank. It was associated with nausea, decreased appetite, insomnia and a temperature of 102.7.  The pain slightly improved but then started to come in \"waves\" and she developed gross hematuria. She has a longstanding history of renal calculi requiring stents in the past.    Primary Care Physician  Juliano Negron M.D.    Consultants  Urology- [facility]    Code Status  Full    Review of Systems  Review of Systems   Constitutional: Positive for fever and malaise/fatigue. Negative for chills.   Respiratory: Negative for shortness of breath.    Cardiovascular: Negative for chest pain, palpitations and leg swelling.   Gastrointestinal: Positive for abdominal pain and nausea. Negative for diarrhea and vomiting.   Genitourinary: Positive for flank pain and hematuria. Negative for dysuria.   Musculoskeletal: Negative for falls.   Neurological: Negative for dizziness, loss of consciousness and headaches.   Psychiatric/Behavioral: The patient has insomnia.    All other systems reviewed and are negative.       Past Medical History  Past Medical History:   Diagnosis Date   • Macrocytosis 3/2014   • Depression    • Endometriosis        Surgical History  Past Surgical History:   Procedure Laterality Date   • ABDOMINAL HYSTERECTOMY TOTAL      2008  still has ovaries (pt believes)   • MENISCUS REPAIR         Medications  No current facility-administered medications on file prior to encounter.      No current outpatient prescriptions on file prior to encounter.       Family History  Family History   Problem Relation Age of Onset   • Diabetes Mother    • Allergies Mother    • " "Hypertension Mother    • Hyperlipidemia Mother    • Other Father      unknown   • Heart Disease Neg Hx    • Psychiatry Neg Hx    • Stroke Neg Hx    • Thyroid Neg Hx    Father with kidney stones    Social History  Social History   Substance Use Topics   • Smoking status: Current Every Day Smoker     Packs/day: 0.10     Types: Cigarettes   • Smokeless tobacco: Never Used   • Alcohol use 0.6 oz/week     1 Standard drinks or equivalent per week      Comment: Occasionally   Smokes 1/2 pack /day. Social drinker and medicinal marijuana. Has a 15 yo son.    Allergies  Allergies   Allergen Reactions   • Pcn [Penicillins] Shortness of Breath     \"SOB, increase Heart beat\"  Patient stated she can't take  Amoxicillin either        Physical Exam  Laboratory   Hemodynamics  Temp (24hrs), Av.7 °C (98.1 °F), Min:36.7 °C (98 °F), Max:36.7 °C (98.1 °F)   Temperature: 36.7 °C (98.1 °F)  Pulse  Av  Min: 56  Max: 99    Blood Pressure: 126/51, NIBP: (!) 94/58      Respiratory      Respiration: (!) 61, Pulse Oximetry: 94 %, O2 Daily Delivery Respiratory : Room Air with O2 Available        RUL Breath Sounds: Clear, RML Breath Sounds: Clear, RLL Breath Sounds: Diminished, MOE Breath Sounds: Clear, LLL Breath Sounds: Diminished    Physical Exam   Constitutional: She is oriented to person, place, and time. She appears well-developed. No distress.   HENT:   Head: Normocephalic.   Mouth/Throat: Oropharynx is clear and moist.   Eyes: EOM are normal. Pupils are equal, round, and reactive to light. No scleral icterus.   Neck: Normal range of motion. Neck supple. No tracheal deviation present.   Cardiovascular: Normal rate, regular rhythm and intact distal pulses.    Pulmonary/Chest: Effort normal and breath sounds normal. No respiratory distress. She has no rales.   Abdominal: Soft. Bowel sounds are normal. She exhibits no distension. There is tenderness (LLQ).   Left flank pain   Musculoskeletal: She exhibits no edema.   Neurological: " She is alert and oriented to person, place, and time.   Skin: Skin is warm and dry.   Psychiatric: She has a normal mood and affect. Her behavior is normal.   Vitals reviewed.      Recent Labs      05/24/18   1354   WBC  6.4   RBC  4.86   HEMOGLOBIN  15.9   HEMATOCRIT  45.6   MCV  93.8   MCH  32.7   MCHC  34.9   RDW  38.6   PLATELETCT  189   MPV  11.2     Recent Labs      05/24/18   1354   SODIUM  138   POTASSIUM  4.1   CHLORIDE  106   CO2  23   GLUCOSE  92   BUN  18   CREATININE  0.85   CALCIUM  9.1     Recent Labs      05/24/18   1354   ALTSGPT  15   ASTSGOT  29   ALKPHOSPHAT  63   TBILIRUBIN  1.1   LIPASE  38   GLUCOSE  92                 No results found for: TROPONINI  Urinalysis:    Lab Results  Component Value Date/Time   SPECGRAVITY 1.015 05/24/2018 1354   GLUCOSEUR Negative 05/24/2018 1354   KETONES Negative 05/24/2018 1354   NITRITE Negative 05/24/2018 1354   WBCURINE 2-5 05/24/2018 1354   RBCURINE 100-150 (A) 05/24/2018 1354   BACTERIA Rare (A) 05/24/2018 1354   EPITHELCELL Negative 05/24/2018 1354        Imaging  CT-RENAL COLIC EVALUATION(A/P W/O)   Final Result      Left lower pole calyceal calculus has transited into the proximal ureter causing mild hydronephrosis      No other change      2 mm right lower pole calculus is nonobstructive      Bilateral parapelvic and cortical renal cysts           Assessment/Plan     I anticipate this patient is appropriate for observation status at this time.    * Nephrolithiasis- (present on admission)   Assessment & Plan    H/o of nephrolithiasis, now with multiple stones seen on CT. She had just seen Urgent Care the day prior and was started on levaquin. Mild hydronephrosis. Urinalysis negative for nitrite or leuk esterase.  - urology following, greatly appreciate  - continue with IVF  - NPO after midnight, plan for stent placement tomorrow  - pain control    CT:  Left lower pole calyceal calculus has transited into the proximal ureter causing mild  hydronephrosis  No other change  2 mm right lower pole calculus is nonobstructive  Bilateral parapelvic and cortical renal cysts        Tobacco use- (present on admission)   Assessment & Plan    Continues to smoke.   - declines nicotine patch and gum  - continued counseling        Depression- (present on admission)   Assessment & Plan    Stable.   - continue paroxetine            VTE prophylaxis: SCDs.

## 2018-05-26 VITALS
SYSTOLIC BLOOD PRESSURE: 101 MMHG | TEMPERATURE: 97.9 F | RESPIRATION RATE: 17 BRPM | BODY MASS INDEX: 19.72 KG/M2 | WEIGHT: 125.66 LBS | OXYGEN SATURATION: 95 % | HEART RATE: 56 BPM | HEIGHT: 67 IN | DIASTOLIC BLOOD PRESSURE: 54 MMHG

## 2018-05-26 PROBLEM — R11.2 NAUSEA AND VOMITING: Status: RESOLVED | Noted: 2018-05-25 | Resolved: 2018-05-26

## 2018-05-26 PROBLEM — I95.9 HYPOTENSION: Status: RESOLVED | Noted: 2017-11-17 | Resolved: 2018-05-26

## 2018-05-26 PROCEDURE — A9270 NON-COVERED ITEM OR SERVICE: HCPCS | Performed by: HOSPITALIST

## 2018-05-26 PROCEDURE — 96376 TX/PRO/DX INJ SAME DRUG ADON: CPT

## 2018-05-26 PROCEDURE — G0378 HOSPITAL OBSERVATION PER HR: HCPCS

## 2018-05-26 PROCEDURE — 700102 HCHG RX REV CODE 250 W/ 637 OVERRIDE(OP): Performed by: HOSPITALIST

## 2018-05-26 PROCEDURE — 700111 HCHG RX REV CODE 636 W/ 250 OVERRIDE (IP): Performed by: HOSPITALIST

## 2018-05-26 PROCEDURE — 99217 PR OBSERVATION CARE DISCHARGE: CPT | Performed by: HOSPITALIST

## 2018-05-26 RX ORDER — OXYCODONE HYDROCHLORIDE AND ACETAMINOPHEN 5; 325 MG/1; MG/1
1 TABLET ORAL EVERY 4 HOURS PRN
Status: DISCONTINUED | OUTPATIENT
Start: 2018-05-26 | End: 2018-05-26 | Stop reason: HOSPADM

## 2018-05-26 RX ORDER — IBUPROFEN 800 MG/1
800 TABLET ORAL EVERY 8 HOURS PRN
Qty: 30 TAB | Refills: 0 | Status: ON HOLD | OUTPATIENT
Start: 2018-05-26 | End: 2019-09-27

## 2018-05-26 RX ORDER — TAMSULOSIN HYDROCHLORIDE 0.4 MG/1
0.4 CAPSULE ORAL DAILY
Qty: 30 CAP | Refills: 0 | Status: SHIPPED | OUTPATIENT
Start: 2018-05-26 | End: 2018-07-20 | Stop reason: SDUPTHER

## 2018-05-26 RX ORDER — OXYCODONE HYDROCHLORIDE AND ACETAMINOPHEN 5; 325 MG/1; MG/1
1-2 TABLET ORAL EVERY 8 HOURS PRN
Qty: 15 TAB | Refills: 0 | Status: SHIPPED | OUTPATIENT
Start: 2018-05-26 | End: 2018-05-31

## 2018-05-26 RX ORDER — POTASSIUM CHLORIDE 20 MEQ/1
40 TABLET, EXTENDED RELEASE ORAL ONCE
Status: COMPLETED | OUTPATIENT
Start: 2018-05-26 | End: 2018-05-26

## 2018-05-26 RX ORDER — PAROXETINE 10 MG/1
10 TABLET, FILM COATED ORAL DAILY
Qty: 30 TAB | Refills: 0 | Status: SHIPPED | OUTPATIENT
Start: 2018-05-27 | End: 2018-07-16 | Stop reason: SDUPTHER

## 2018-05-26 RX ORDER — PHENAZOPYRIDINE HYDROCHLORIDE 200 MG/1
200 TABLET, FILM COATED ORAL 3 TIMES DAILY PRN
Qty: 18 TAB | Refills: 0 | Status: SHIPPED | OUTPATIENT
Start: 2018-05-26 | End: 2018-07-20

## 2018-05-26 RX ORDER — ZOLPIDEM TARTRATE 5 MG/1
2.5 TABLET ORAL NIGHTLY PRN
Qty: 10 TAB | Refills: 0 | Status: SHIPPED | OUTPATIENT
Start: 2018-05-26 | End: 2018-05-31

## 2018-05-26 RX ADMIN — PAROXETINE HYDROCHLORIDE 10 MG: 10 TABLET, FILM COATED ORAL at 09:03

## 2018-05-26 RX ADMIN — OXYCODONE HYDROCHLORIDE AND ACETAMINOPHEN 1 TABLET: 5; 325 TABLET ORAL at 10:47

## 2018-05-26 RX ADMIN — HYDROMORPHONE HYDROCHLORIDE 0.25 MG: 2 INJECTION INTRAMUSCULAR; INTRAVENOUS; SUBCUTANEOUS at 06:05

## 2018-05-26 RX ADMIN — POTASSIUM CHLORIDE 40 MEQ: 1500 TABLET, EXTENDED RELEASE ORAL at 10:46

## 2018-05-26 RX ADMIN — STANDARDIZED SENNA CONCENTRATE AND DOCUSATE SODIUM 2 TABLET: 8.6; 5 TABLET, FILM COATED ORAL at 09:05

## 2018-05-26 RX ADMIN — ACETAMINOPHEN 650 MG: 325 TABLET, FILM COATED ORAL at 09:04

## 2018-05-26 ASSESSMENT — PAIN SCALES - GENERAL
PAINLEVEL_OUTOF10: 9
PAINLEVEL_OUTOF10: 0
PAINLEVEL_OUTOF10: 5
PAINLEVEL_OUTOF10: 0

## 2018-05-26 NOTE — PROGRESS NOTES
"Urology Progress Note    Post op Day # 1    Overnight Events: None    S: No fevers, chills, nausea or vomiting.  +Stent colic.  Difficult time sleeping    O:   Blood pressure 101/54, pulse (!) 56, temperature 36.6 °C (97.9 °F), resp. rate 17, height 1.702 m (5' 7\"), weight 57 kg (125 lb 10.6 oz), last menstrual period 09/27/2008, SpO2 95 %, not currently breastfeeding.  Recent Labs      05/24/18   1354  05/25/18   0416   SODIUM  138  141   POTASSIUM  4.1  3.3*   CHLORIDE  106  107   CO2  23  28   GLUCOSE  92  104*   BUN  18  22   CREATININE  0.85  0.94   CALCIUM  9.1  8.3*     Recent Labs      05/24/18   1354  05/25/18   0416   WBC  6.4  5.4   RBC  4.86  4.18*   HEMOGLOBIN  15.9  13.3   HEMATOCRIT  45.6  40.3   MCV  93.8  96.4   MCH  32.7  31.8   MCHC  34.9  33.0*   RDW  38.6  40.0   PLATELETCT  189  165   MPV  11.2  10.7         Intake/Output Summary (Last 24 hours) at 05/26/18 0757  Last data filed at 05/26/18 0500   Gross per 24 hour   Intake             2790 ml   Output             1250 ml   Net             1540 ml       Exam:  Abdomen soft, benign.    Urine: bloody      A/P:    Active Hospital Problems    Diagnosis   • Nephrolithiasis [N20.0]     Priority: High   • Nausea and vomiting [R11.2]   • Hypotension [I95.9]   • Tobacco use [Z72.0]   • Depression [F32.9]     Patient on viibryd           Stable.   Home today per hospitalists.  Appreciate them writing for pain meds, and perhaps a medicine for insomnia? (patient request)  We will follow up as ouptatient for stent removal    "

## 2018-05-26 NOTE — CARE PLAN
Problem: Discharge Barriers/Planning  Goal: Patient's continuum of care needs will be met    Intervention: Explain discharge instructions and medication reconcilliation to patient and significant other/support system  1100 seen by dr Hernandez pt verbalizes understanding of discharge instructions and meets all criteria for discharge.urine remains bloody enc and taking extra po fluids.

## 2018-05-26 NOTE — DISCHARGE SUMMARY
"CHIEF COMPLAINT ON ADMISSION  Chief Complaint   Patient presents with   • Flank Pain       CODE STATUS  Full Code    HPI & HOSPITAL COURSE  48 y.o. female who presented 5/24/2018 with flank pain. She had noted pain that started as a generalized abdominal pain but then woke up 2/2 sharp pain in the left pelvis and flank. It was associated with nausea, decreased appetite, insomnia and a temperature of 102.7.  The pain slightly improved but then started to come in \"waves\" and she developed gross hematuria. She has a longstanding history of renal calculi requiring stents in the past.patient admitted for urologic workup. Labs were monitored. IV antibiotics were initiated.  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    Bilateral stent placement in the moieties on the left side.  Bilateral   retrogrades of the complete duplicated upper and lower pole ureters and   collecting systems and bilateral ureteroscopy of both the left upper pole   complete duplication and the left lower pole complete duplication of ureters.  Patient tolerated procedure well.   Vitals remained stable. Labs were stable as well, K was replaced as needed. Patient was medical stable for discharge and will fu with urology for stent removal as outpatient. She was discharged in stable medical conditions on pain control. Patient understood and agreed with the above plan.     The patient met 2-midnight criteria for an inpatient stay at the time of discharge.    Therefore, she is discharged in good and stable condition with close outpatient " follow-up.    SPECIFIC OUTPATIENT FOLLOW-UP  pcp  Urology for stent removal    DISCHARGE PROBLEM LIST  Principal Problem (Resolved):    Nephrolithiasis POA: Yes  Active Problems:    Depression POA: Yes      Overview: Patient on viibryd          Tobacco use POA: Yes  Resolved Problems:    Hypotension POA: Yes    Nausea and vomiting POA: Unknown      FOLLOW UP  Future Appointments  Date Time Provider Department Center   5/30/2018 9:00 AM CARE MANAGER VICKIE RODRIGUEZ   5/31/2018 7:40 AM Rebekah Leiva M.D. [facility] JENNIFER Negron M.D.  [address]  [place] [id number] [phone]    Schedule an appointment as soon as possible for a visit in 1 day      Naveed Hernandez M.D.  [address]  [place] [address]  [phone]      Your will receive a call from Dr. Hernandez's office to schedule for planned stent removal      MEDICATIONS ON DISCHARGE   Rachel Jacobs   Home Medication Instructions ROJAS:[id number]    Printed on:05/26/18 1023   Medication Information                      ibuprofen (MOTRIN) 800 MG Tab  Take 1 Tab by mouth every 8 hours as needed for Moderate Pain.             oxyCODONE-acetaminophen (PERCOCET) 5-325 MG Tab  Take 1-2 Tabs by mouth every 8 hours as needed for Severe Pain for up to 5 days.             PARoxetine (PAXIL) 10 MG Tab  Take 1 Tab by mouth every day.             promethazine-dextromethorphan (PROMETHAZINE-DM) 6.25-15 MG/5ML syrup  Take  by mouth at bedtime as needed for Cough.             zolpidem (AMBIEN) 5 MG Tab  Take 0.5 Tabs by mouth at bedtime as needed for Sleep for up to 5 days.                 DIET  Orders Placed This Encounter   Procedures   • DIET ORDER     Standing Status:   Standing     Number of Occurrences:   1     Order Specific Question:   Diet:     Answer:   Regular [1]       ACTIVITY  As tolerated.  Weight bearing as tolerated      CONSULTATIONS  none    PROCEDURES  none    LABORATORY  Lab Results   Component Value Date/Time     SODIUM 141 05/25/2018 04:16 AM    POTASSIUM 3.3 (L) 05/25/2018 04:16 AM    CHLORIDE 107 05/25/2018 04:16 AM    CO2 28 05/25/2018 04:16 AM    GLUCOSE 104 (H) 05/25/2018 04:16 AM    BUN 22 05/25/2018 04:16 AM    CREATININE 0.94 05/25/2018 04:16 AM        Lab Results   Component Value Date/Time    WBC 5.4 05/25/2018 04:16 AM    HEMOGLOBIN 13.3 05/25/2018 04:16 AM    HEMATOCRIT 40.3 05/25/2018 04:16 AM    PLATELETCT 165 05/25/2018 04:16 AM        Total time of the discharge process exceeds 40 minutes

## 2018-05-26 NOTE — PROGRESS NOTES
1630 Returned to room 210 from PACU without c/o pain /cramps, voiding 350 bloody urine at this time. Enc po fluids alex well will discharge when meets criteria planning in am. Family at bedside

## 2018-05-26 NOTE — DISCHARGE INSTRUCTIONS
Discharge Instructions    Discharged to home by car with relative. Discharged via wheelchair, hospital escort: Yes.  Special equipment needed: Not Applicable    Be sure to schedule a follow-up appointment with your primary care doctor or any specialists as instructed.     Discharge Plan:   Diet Plan: Discussed  Activity Level: Discussed  Smoking Cessation Offered: Patient Refused  Confirmed Follow up Appointment: Patient to Call and Schedule Appointment  Confirmed Symptoms Management: Discussed  Medication Reconciliation Updated: Yes  Pneumococcal Vaccine Administered/Refused: Not given - Patient refused pneumococcal vaccine  Influenza Vaccine Indication: Patient Refuses    I understand that a diet low in cholesterol, fat, and sodium is recommended for good health. Unless I have been given specific instructions below for another diet, I accept this instruction as my diet prescription.   Other diet: regular    Special Instructions: no heavy lifting   If any problems please call Dr Dominguez      · Is patient discharged on Warfarin / Coumadin?   No     Depression / Suicide Risk    As you are discharged from this [facility] facility, it is important to learn how to keep safe from harming yourself.    Recognize the warning signs:  · Abrupt changes in personality, positive or negative- including increase in energy   · Giving away possessions  · Change in eating patterns- significant weight changes-  positive or negative  · Change in sleeping patterns- unable to sleep or sleeping all the time   · Unwillingness or inability to communicate  · Depression  · Unusual sadness, discouragement and loneliness  · Talk of wanting to die  · Neglect of personal appearance   · Rebelliousness- reckless behavior  · Withdrawal from people/activities they love  · Confusion- inability to concentrate     If you or a loved one observes any of these behaviors or has concerns about self-harm, here's what you can do:  · Talk about it- your  feelings and reasons for harming yourself  · Remove any means that you might use to hurt yourself (examples: pills, rope, extension cords, firearm)  · Get professional help from the community (Mental Health, Substance Abuse, psychological counseling)  · Do not be alone:Call your Safe Contact- someone whom you trust who will be there for you.  · Call your local CRISIS HOTLINE 229-9179 or 908-945-1606  · Call your local Children's Mobile Crisis Response Team [place] (438) 184-4902 or wwwNykaa  · Call the toll free National Suicide Prevention Hotlines   · National Suicide Prevention Lifeline 209-420-OMKN (9963)  · [employer] Hope Line Network 800-SUICIDE (088-9409)    Kidney Stones  Kidney stones (urolithiasis) are solid, rock-like deposits that form inside of the organs that make urine (kidneys). A kidney stone may form in a kidney and move into the bladder, where it can cause intense pain and block the flow of urine. Kidney stones are created when high levels of certain minerals are found in the urine. They are usually passed through urination, but in some cases, medical treatment may be needed to remove them.  What are the causes?  Kidney stones may be caused by:  · A condition in which certain glands produce too much parathyroid hormone (primary hyperparathyroidism), which causes too much calcium buildup in the blood.  · Buildup of uric acid crystals in the bladder (hyperuricosuria). Uric acid is a chemical that the body produces when you eat certain foods. It usually exits the body in the urine.  · Narrowing (stricture) of one or both of the tubes that drain urine from the kidneys to the bladder (ureters).  · A kidney blockage that is present at birth (congenital obstruction).  · Past surgery on the kidney or the ureters, such as gastric bypass surgery.  What increases the risk?  The following factors make you more likely to develop kidney stones:  · Having had a kidney stone in the past.  · Having  a family history of kidney stones.  · Not drinking enough water.  · Eating a diet that is high in protein, salt (sodium), or sugar.  · Being overweight or obese.  What are the signs or symptoms?  Symptoms of a kidney stone may include:  · Nausea.  · Vomiting.  · Blood in the urine (hematuria).  · Pain in the side of the abdomen, right below the ribs (flank pain). Pain usually spreads (radiates) to the groin.  · Needing to urinate frequently or urgently.  How is this diagnosed?  This condition may be diagnosed based on:  · Your medical history.  · A physical exam.  · Blood tests.  · Urine tests.  · CT scan.  · Abdominal X-ray.  · A procedure to examine the inside of the bladder (cystoscopy).  How is this treated?  Treatment for kidney stones depends on the size, location, and makeup of the stones. Treatment may involve:  · Analyzing your urine before and after you pass the stone through urination.  · Being monitored at the hospital until you pass the stone through urination.  · Increasing your fluid intake and decreasing the amount of calcium and protein in your diet.  · A procedure to break up kidney stones in the bladder using:  ¨ A focused beam of light (laser therapy).  ¨ Shock waves (extracorporeal shock wave lithotripsy).  · Surgery to remove kidney stones. This may be needed if you have severe pain or have stones that block your urinary tract.  Follow these instructions at home:  Eating and drinking  · Drink enough fluid to keep your urine clear or pale yellow. This will help you to pass the kidney stone.  · If directed, change your diet. This may include:  ¨ Limiting how much sodium you eat.  ¨ Eating more fruits and vegetables.  ¨ Limiting how much meat, poultry, fish, and eggs you eat.  · Follow instructions from your health care provider about eating or drinking restrictions.  General instructions  · Collect urine samples as told by your health care provider. You may need to collect a urine sample:  ¨ 24  hours after you pass the stone.  ¨ 8-12 weeks after passing the kidney stone, and every 6-12 months after that.  · Strain your urine every time you urinate, for as long as directed. Use the strainer that your health care provider recommends.  · Do not throw out the kidney stone after passing it. Keep the stone so it can be tested by your health care provider. Testing the makeup of your kidney stone may help prevent you from getting kidney stones in the future.  · Take over-the-counter and prescription medicines only as told by your health care provider.  · Keep all follow-up visits as told by your health care provider. This is important. You may need follow-up X-rays or ultrasounds to make sure that your stone has passed.  How is this prevented?  To prevent another kidney stone:  · Drink enough fluid to keep your urine clear or pale yellow. This is the best way to prevent kidney stones.  · Eat a healthy diet and follow recommendations from your health care provider about foods to avoid. You may be instructed to eat a low-protein diet. Recommendations vary depending on the type of kidney stone that you have.  · Maintain a healthy weight.  Contact a health care provider if:  · You have pain that gets worse or does not get better with medicine.  Get help right away if:  · You have a fever or chills.  · You develop severe pain.  · You develop new abdominal pain.  · You faint.  · You are unable to urinate.  This information is not intended to replace advice given to you by your health care provider. Make sure you discuss any questions you have with your health care provider.  Document Released: 12/18/2006 Document Revised: 07/07/2017 Document Reviewed: 06/02/2017  [employer] Patient Education © 2017 [employer]

## 2018-05-30 ENCOUNTER — PATIENT OUTREACH (OUTPATIENT)
Dept: HEALTH INFORMATION MANAGEMENT | Facility: OTHER | Age: 48
End: 2018-05-30

## 2018-05-30 NOTE — PROGRESS NOTES
CM post discharge outreach call.  CM spoke to patient and reviewed post discharge questions.  Patient reports she is having ongoing pain in her lower back and abdomen. Reports this is the same pain she was having while hospitalized. Pt reports she hasn't been taking oxycodone for pain. Reports she has been taking ibuprofen as directed.  Reports ibuprofen isn't working.  CM recommended patient take oxycodone for severe pain as order by provider. CM also recommended patient contact urology provider and inform of ongoing pain issues.  CM reviewed ER precautions with patient if not feeling better or any worsening symptoms. Pt verbalized understanding.  Patient will contact her PCP if any questions or concerns. CM provided patient with contact information. Patient scheduled at post discharge clinic for 5/31/18.

## 2018-05-31 ENCOUNTER — OFFICE VISIT (OUTPATIENT)
Dept: MEDICAL GROUP | Facility: CLINIC | Age: 48
End: 2018-05-31
Payer: COMMERCIAL

## 2018-05-31 ENCOUNTER — APPOINTMENT (OUTPATIENT)
Dept: RADIOLOGY | Facility: MEDICAL CENTER | Age: 48
End: 2018-05-31
Attending: EMERGENCY MEDICINE
Payer: COMMERCIAL

## 2018-05-31 ENCOUNTER — HOSPITAL ENCOUNTER (EMERGENCY)
Facility: MEDICAL CENTER | Age: 48
End: 2018-05-31
Attending: EMERGENCY MEDICINE
Payer: COMMERCIAL

## 2018-05-31 VITALS
TEMPERATURE: 99.7 F | BODY MASS INDEX: 19.71 KG/M2 | SYSTOLIC BLOOD PRESSURE: 140 MMHG | DIASTOLIC BLOOD PRESSURE: 63 MMHG | RESPIRATION RATE: 16 BRPM | HEIGHT: 68 IN | HEART RATE: 66 BPM | WEIGHT: 130.07 LBS | OXYGEN SATURATION: 95 %

## 2018-05-31 VITALS
SYSTOLIC BLOOD PRESSURE: 110 MMHG | OXYGEN SATURATION: 94 % | WEIGHT: 128 LBS | HEART RATE: 78 BPM | HEIGHT: 67 IN | TEMPERATURE: 99.1 F | DIASTOLIC BLOOD PRESSURE: 66 MMHG | RESPIRATION RATE: 20 BRPM | BODY MASS INDEX: 20.09 KG/M2

## 2018-05-31 DIAGNOSIS — N13.30 HYDRONEPHROSIS OF LEFT KIDNEY: ICD-10-CM

## 2018-05-31 DIAGNOSIS — Z09 HOSPITAL DISCHARGE FOLLOW-UP: ICD-10-CM

## 2018-05-31 DIAGNOSIS — N20.1 URETEROLITHIASIS: ICD-10-CM

## 2018-05-31 DIAGNOSIS — T83.122S: ICD-10-CM

## 2018-05-31 DIAGNOSIS — N20.0 NEPHROLITHIASIS: ICD-10-CM

## 2018-05-31 DIAGNOSIS — R10.9 LEFT FLANK PAIN: ICD-10-CM

## 2018-05-31 LAB
ALBUMIN SERPL BCP-MCNC: 3.5 G/DL (ref 3.2–4.9)
ALBUMIN/GLOB SERPL: 1.1 G/DL
ALP SERPL-CCNC: 61 U/L (ref 30–99)
ALT SERPL-CCNC: 8 U/L (ref 2–50)
ANION GAP SERPL CALC-SCNC: 8 MMOL/L (ref 0–11.9)
APPEARANCE UR: ABNORMAL
AST SERPL-CCNC: 15 U/L (ref 12–45)
BACTERIA #/AREA URNS HPF: ABNORMAL /HPF
BASOPHILS # BLD AUTO: 0.5 % (ref 0–1.8)
BASOPHILS # BLD: 0.06 K/UL (ref 0–0.12)
BILIRUB SERPL-MCNC: 0.8 MG/DL (ref 0.1–1.5)
BILIRUB UR QL STRIP.AUTO: ABNORMAL
BUN SERPL-MCNC: 17 MG/DL (ref 8–22)
CALCIUM SERPL-MCNC: 8.8 MG/DL (ref 8.4–10.2)
CHLORIDE SERPL-SCNC: 105 MMOL/L (ref 96–112)
CO2 SERPL-SCNC: 24 MMOL/L (ref 20–33)
COLOR UR: YELLOW
CREAT SERPL-MCNC: 0.85 MG/DL (ref 0.5–1.4)
EOSINOPHIL # BLD AUTO: 0.2 K/UL (ref 0–0.51)
EOSINOPHIL NFR BLD: 1.8 % (ref 0–6.9)
ERYTHROCYTE [DISTWIDTH] IN BLOOD BY AUTOMATED COUNT: 37.8 FL (ref 35.9–50)
GLOBULIN SER CALC-MCNC: 3.1 G/DL (ref 1.9–3.5)
GLUCOSE SERPL-MCNC: 110 MG/DL (ref 65–99)
GLUCOSE UR STRIP.AUTO-MCNC: NEGATIVE MG/DL
HCT VFR BLD AUTO: 37.3 % (ref 37–47)
HGB BLD-MCNC: 12.8 G/DL (ref 12–16)
IMM GRANULOCYTES # BLD AUTO: 0.05 K/UL (ref 0–0.11)
IMM GRANULOCYTES NFR BLD AUTO: 0.4 % (ref 0–0.9)
KETONES UR STRIP.AUTO-MCNC: NEGATIVE MG/DL
LEUKOCYTE ESTERASE UR QL STRIP.AUTO: NEGATIVE
LYMPHOCYTES # BLD AUTO: 1.82 K/UL (ref 1–4.8)
LYMPHOCYTES NFR BLD: 16.3 % (ref 22–41)
MCH RBC QN AUTO: 32.1 PG (ref 27–33)
MCHC RBC AUTO-ENTMCNC: 34.3 G/DL (ref 33.6–35)
MCV RBC AUTO: 93.5 FL (ref 81.4–97.8)
MICRO URNS: ABNORMAL
MONOCYTES # BLD AUTO: 0.76 K/UL (ref 0–0.85)
MONOCYTES NFR BLD AUTO: 6.8 % (ref 0–13.4)
MUCOUS THREADS #/AREA URNS HPF: ABNORMAL /HPF
NEUTROPHILS # BLD AUTO: 8.28 K/UL (ref 2–7.15)
NEUTROPHILS NFR BLD: 74.2 % (ref 44–72)
NITRITE UR QL STRIP.AUTO: POSITIVE
NRBC # BLD AUTO: 0 K/UL
NRBC BLD-RTO: 0 /100 WBC
PH UR STRIP.AUTO: 6.5 [PH]
PLATELET # BLD AUTO: 278 K/UL (ref 164–446)
PMV BLD AUTO: 10.1 FL (ref 9–12.9)
POTASSIUM SERPL-SCNC: 3.1 MMOL/L (ref 3.6–5.5)
PROT SERPL-MCNC: 6.6 G/DL (ref 6–8.2)
PROT UR QL STRIP: 30 MG/DL
RBC # BLD AUTO: 3.99 M/UL (ref 4.2–5.4)
RBC # URNS HPF: ABNORMAL /HPF
RBC UR QL AUTO: ABNORMAL
SODIUM SERPL-SCNC: 137 MMOL/L (ref 135–145)
SP GR UR STRIP.AUTO: 1.01
UNIDENT CRYS URNS QL MICRO: ABNORMAL /HPF
WBC # BLD AUTO: 11.2 K/UL (ref 4.8–10.8)
WBC #/AREA URNS HPF: ABNORMAL /HPF

## 2018-05-31 PROCEDURE — 74176 CT ABD & PELVIS W/O CONTRAST: CPT

## 2018-05-31 PROCEDURE — 96365 THER/PROPH/DIAG IV INF INIT: CPT

## 2018-05-31 PROCEDURE — 80053 COMPREHEN METABOLIC PANEL: CPT

## 2018-05-31 PROCEDURE — 81001 URINALYSIS AUTO W/SCOPE: CPT

## 2018-05-31 PROCEDURE — 36415 COLL VENOUS BLD VENIPUNCTURE: CPT

## 2018-05-31 PROCEDURE — 96361 HYDRATE IV INFUSION ADD-ON: CPT

## 2018-05-31 PROCEDURE — 700111 HCHG RX REV CODE 636 W/ 250 OVERRIDE (IP): Performed by: EMERGENCY MEDICINE

## 2018-05-31 PROCEDURE — 99285 EMERGENCY DEPT VISIT HI MDM: CPT

## 2018-05-31 PROCEDURE — 700105 HCHG RX REV CODE 258: Performed by: EMERGENCY MEDICINE

## 2018-05-31 PROCEDURE — 85025 COMPLETE CBC W/AUTO DIFF WBC: CPT

## 2018-05-31 PROCEDURE — 51701 INSERT BLADDER CATHETER: CPT

## 2018-05-31 PROCEDURE — 96375 TX/PRO/DX INJ NEW DRUG ADDON: CPT

## 2018-05-31 PROCEDURE — 96376 TX/PRO/DX INJ SAME DRUG ADON: CPT

## 2018-05-31 PROCEDURE — 99215 OFFICE O/P EST HI 40 MIN: CPT | Performed by: FAMILY MEDICINE

## 2018-05-31 RX ORDER — SODIUM CHLORIDE 9 MG/ML
1000 INJECTION, SOLUTION INTRAVENOUS ONCE
Status: COMPLETED | OUTPATIENT
Start: 2018-05-31 | End: 2018-05-31

## 2018-05-31 RX ORDER — MORPHINE SULFATE 4 MG/ML
4 INJECTION, SOLUTION INTRAMUSCULAR; INTRAVENOUS ONCE
Status: COMPLETED | OUTPATIENT
Start: 2018-05-31 | End: 2018-05-31

## 2018-05-31 RX ORDER — CEFTRIAXONE 1 G/1
1 INJECTION, POWDER, FOR SOLUTION INTRAMUSCULAR; INTRAVENOUS ONCE
Status: COMPLETED | OUTPATIENT
Start: 2018-05-31 | End: 2018-05-31

## 2018-05-31 RX ORDER — HYDROCODONE BITARTRATE AND ACETAMINOPHEN 5; 325 MG/1; MG/1
1-2 TABLET ORAL EVERY 6 HOURS PRN
Qty: 20 TAB | Refills: 0 | Status: SHIPPED | OUTPATIENT
Start: 2018-05-31 | End: 2018-06-05

## 2018-05-31 RX ORDER — ONDANSETRON 2 MG/ML
4 INJECTION INTRAMUSCULAR; INTRAVENOUS ONCE
Status: COMPLETED | OUTPATIENT
Start: 2018-05-31 | End: 2018-05-31

## 2018-05-31 RX ORDER — KETOROLAC TROMETHAMINE 30 MG/ML
15 INJECTION, SOLUTION INTRAMUSCULAR; INTRAVENOUS ONCE
Status: COMPLETED | OUTPATIENT
Start: 2018-05-31 | End: 2018-05-31

## 2018-05-31 RX ADMIN — ONDANSETRON 4 MG: 2 INJECTION INTRAMUSCULAR; INTRAVENOUS at 18:31

## 2018-05-31 RX ADMIN — SODIUM CHLORIDE 1000 ML: 9 INJECTION, SOLUTION INTRAVENOUS at 18:31

## 2018-05-31 RX ADMIN — CEFTRIAXONE 1 G: 1 INJECTION, POWDER, FOR SOLUTION INTRAMUSCULAR; INTRAVENOUS at 20:52

## 2018-05-31 RX ADMIN — MORPHINE SULFATE 4 MG: 4 INJECTION INTRAVENOUS at 21:33

## 2018-05-31 RX ADMIN — MORPHINE SULFATE 4 MG: 4 INJECTION INTRAVENOUS at 18:31

## 2018-05-31 RX ADMIN — KETOROLAC TROMETHAMINE 15 MG: 30 INJECTION, SOLUTION INTRAMUSCULAR at 18:31

## 2018-05-31 NOTE — PATIENT INSTRUCTIONS
If you need further assistance, or have any questions; concerns or lingering symptoms before seeing your Primary Care Provider or specialist.     Do not hesitate to contact us.      Please contact us at the Post-Hospital Follow Up Program at [phone].   Our offices hours are Monday-Friday 8 am-5 pm.

## 2018-05-31 NOTE — PROGRESS NOTES
"Subjective:     Rachel Jacobs is a 48 y.o. female who presents for Hospital Follow-up.  Chart and discharge summary reviewed.   Date of discharge 5/26/18.  48- hour post discharge RN call completed on 5/30/18 and documented in the medical record by Jessica Fields RN:  POST DISCHARGE CALL:  Discharge Date:5/26/2018   Date of Outreach Call: 5/30/2018  9:00 AM  Now that you're home, how are you doing? Poor  Comment:Pt reports she is having ongoing pain in her  lower back and abdomen. States same pain as when she was  hospitalized.  See progress notes.  Do you have questions about your medications? No    Did you fill your medications? Yes    Do you have a follow-up appointment scheduled?Yes  Comment:Post discharge clinic on 5/31/18    Discharging Department: [facility]    Number of Attempts: 1  Current or previous attempts completed within two business days of discharge? Yes  Provided education regarding treatment plan, medication, self-management, ADLs? Yes  Comment:ER precautions reviewed if not feeling better  and ongoing pain, or any worsening symptoms go to ER for  evaluation. Pt verbalized understanding.  Has patient completed Advance Directive? If yes, advise them to bring to appointment. No  Care Manager phone number provided? Yes  Is there anything else I can help you with? No         HPI: Recently hospitalized for severe left flank pain.  She has a history of kidney stones.  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  Procedure was done by Dr. Dominguez. The stone was not found and it was postulated that she passed the stone. She was discharged on Percocet and Motrin and instructed to follow-up with Dr. Hernandez for stent removal.    Since returning home, " "patient reports feeling poorly.  She is in severe pain 10 out of 10 in severity. The Motrin did not help at all. The Percocet only helped slightly and she took her last pill early this morning      Allergies:   Pcn [penicillins]    Social History:  Social History   Substance Use Topics   • Smoking status: Current Every Day Smoker     Packs/day: 0.10     Types: Cigarettes   • Smokeless tobacco: Never Used   • Alcohol use 0.6 oz/week     1 Standard drinks or equivalent per week      Comment: Occasionally        ROS:    Constitutional:  Denies fever, chills, night sweats, fatigue or malaise  HENT: Denies head congestion, ear pain or drainage, decreased hearing, sore throat  Eyes: Denies visual changes, eye drainage or redness, eye pain  Neck: Denies pain, swollen glands, decreased range of motion  Lungs: Denies shortness of breath, wheezing.  She has a residual cough and was recently treated for pneumonia.  Cardiovascular: Denies chest pain, orthopnea, lower extremity edema, palpitations  Abdominal: Denies nausea or vomiting.  She has left side abdominal pain.  She has not had a bowel movement in a few days but has not been eating much.  Endocrine: Denies unexplained weight changes, heat or cold intolerance, hair loss, polyuria or polydipsia  Neurological: Denies dizziness, headache, confusion, focal weakness or numbness, memory loss       Objective:     Blood pressure 110/66, pulse 78, temperature 37.3 °C (99.1 °F), resp. rate 20, height 1.702 m (5' 7\"), weight 58.1 kg (128 lb), last menstrual period 09/27/2008, SpO2 94 %.     Physical Exam:    GEN:  Alert, oriented, in moderate to severe distress  HEENT:  PERRLA, EOMI  LUNGS:  Clear to auscultation without rales, rhonci, or wheezes.  CV:  Heart RRR without murmurs or S3 or S4  ABD:  Soft, left lower abdominal pain, nondistended, normal bowel sounds.  No hepatosplenomegaly.  Positive left CVA tenderness to palpation  EXT:  Without cyanosis, clubbing, or edema.  NEURO: "  Cranial nerves II through XII intact.  Motor function and sensation intact.  SKIN: No rashes or suspicious lesions.  PSYCH:  Behavior is appropriate.      Assessment and Plan:     1. Hospital follow-up:   Hospitalization and results reviewed with patient. High risk conditions requiring teaching or care coordination were identified and addressed.The patient demonstrate understanding of admission and underlying conditions. The patient understands discharge instructions and when to seek medical attention. Medications reviewed including instructions regarding high risk medications, dosing and side effects.    The patient is able to safely adhere to ADL/IADL, treatment and medication regimen, self-manage of high-risk conditions? Yes   The patient requires physical therapy/home health/DME referral? No   The patient requires referral to care coordination/behavioral health/social work?  No   Patient requires referral for pharmacy consult? No     2. Nephrolithiasis  -She may need further imaging to determine status.    3. Ureteral stent displacement, sequela  -I spoke with Dr. Luke Villalta who is on-call for urology [place].  He reviewed the patient's films. He will see her in the office right away and perform stent removal.    4.  Persistent Severe left flank pain  -I spoke with Dr. Luke Villalta who is on-call for urology [place].  He reviewed the patient's films. He will see her in the office right away and perform stent removal.    5. Mild hydronephrosis of left kidney  -Treatment per urology.        Medication Reconciliation  Medication list at end of encounter:   Current Outpatient Prescriptions   Medication Sig Dispense Refill   • oxyCODONE-acetaminophen (PERCOCET) 5-325 MG Tab Take 1-2 Tabs by mouth every 8 hours as needed for Severe Pain for up to 5 days. 15 Tab 0   • PARoxetine (PAXIL) 10 MG Tab Take 1 Tab by mouth every day. 30 Tab 0   • tamsulosin (FLOMAX) 0.4 MG capsule Take 1 Cap by mouth every day. 30 Cap 0   •  zolpidem (AMBIEN) 5 MG Tab Take 0.5 Tabs by mouth at bedtime as needed for Sleep for up to 5 days. 10 Tab 0   • ibuprofen (MOTRIN) 800 MG Tab Take 1 Tab by mouth every 8 hours as needed for Moderate Pain. 30 Tab 0   • phenazopyridine (PYRIDIUM) 200 MG Tab Take 1 Tab by mouth 3 times a day as needed for Moderate Pain. 18 Tab 0   • promethazine-dextromethorphan (PROMETHAZINE-DM) 6.25-15 MG/5ML syrup Take  by mouth at bedtime as needed for Cough.       No current facility-administered medications for this visit.        Primary care follow-up:  New health conditions identified during hospitalization? Yes   Changes to medications during hospitalization or today? Yes     Recommended followup:  with Juliano Negron M.D.   Future Appointments       Provider Department Center    6/21/2018 2:20 PM Juliano Negron M.D. [facility]          Patient Instruction  Patient provided with educational material on discharge diagnosis and management of symptoms/red flags. Patient instructed to keep follow-up appointments and to bring written questions and and actual medications to each office visit. Patient instructed to call PCP/specialist with any problems/questions/concerns. Patient verbalizes understanding and has no further questions at this time.    Total of 40 minutes face-to-face time was spent with patient, with greater than 50% of the time spent in counseling and coordination of care.

## 2018-06-01 NOTE — ED PROVIDER NOTES
ED Provider Note    CHIEF COMPLAINT  Chief Complaint   Patient presents with   • Flank Pain     stents removed today       HPI  Rachel Jacobs is a 48 y.o. female who presents with severe left flank pain. She was seen in the ER approximately 1 week ago for hematuria CT did show a left ureteral stone in the proximal ureter on the left. She was seen by urology and ultimately had 2 stents placed to bypass the stone. Today she went back saw Dr. Valdez to remove the stance felt somewhat better when she went home FROM a nap and had severe recurrent pain on the left side. She comes in for evaluation he has nausea no vomiting no fever chills or dysuria the review of systems is limited to the severe pain patient seen at this time.    REVIEW OF SYSTEMS  See HPI for further details    PAST MEDICAL HISTORY  Past Medical History:   Diagnosis Date   • Depression    • Endometriosis    • Macrocytosis 3/2014       FAMILY HISTORY  Family History   Problem Relation Age of Onset   • Diabetes Mother    • Allergies Mother    • Hypertension Mother    • Hyperlipidemia Mother    • Other Father      unknown   • Heart Disease Neg Hx    • Psychiatry Neg Hx    • Stroke Neg Hx    • Thyroid Neg Hx        SOCIAL HISTORY  Social History     Social History   • Marital status: Single     Spouse name: N/A   • Number of children: N/A   • Years of education: N/A     Social History Main Topics   • Smoking status: Current Every Day Smoker     Packs/day: 0.10     Types: Cigarettes   • Smokeless tobacco: Never Used   • Alcohol use 0.6 oz/week     1 Standard drinks or equivalent per week      Comment: Occasionally   • Drug use: No   • Sexual activity: Yes     Partners: Male     Birth control/ protection: Surgical     Other Topics Concern   • Not on file     Social History Narrative    Lives with boyfriend.     Children: 1    Work: office       SURGICAL HISTORY  Past Surgical History:   Procedure Laterality Date   • CYSTOSCOPY Left 5/25/2018    Procedure:  "CYSTOSCOPY with STENT placement;  Surgeon: Blaise Dominguez M.D.;  Location: SURGERY [facility];  Service: Urology   • URETEROSCOPY Left 5/25/2018    Procedure: URETEROSCOPY;  Surgeon: Blaise Dominguez M.D.;  Location: SURGERY [facility];  Service: Urology   • ABDOMINAL HYSTERECTOMY TOTAL      2008  still has ovaries (pt believes)   • MENISCUS REPAIR         CURRENT MEDICATIONS  Home Medications    **Home medications have not yet been reviewed for this encounter**         ALLERGIES  Allergies   Allergen Reactions   • Pcn [Penicillins] Shortness of Breath     \"SOB, increase Heart beat\"  Patient stated she can't take  Amoxicillin either       PHYSICAL EXAM  VITAL SIGNS: /63   Pulse 91   Temp 37.6 °C (99.7 °F)   Resp 16   Ht 1.715 m (5' 7.5\")   Wt 59 kg (130 lb 1.1 oz)   LMP 09/27/2008   SpO2 98%   BMI 20.07 kg/m²     Constitutional: Patient is alert and oriented x3 in severe distress   HENT: Slightly dry  Eyes: No conjunctivitis or icterus  Neck: Trach is midline no palpable thyroid  Lymphatic: Negative anterior cervical lymphadenopathy  Cardiovascular: Normal heart rate   Thorax & Lungs: Clear to auscultation  Back: No CVA tenderness  Abdomen: Soft nontender palpation  Neurologic: Normal motor sensation  Extremities: No edema  Psychiatric: Affect normal, Judgment normal, Mood normal.       CT-RENAL COLIC EVALUATION(A/P W/O)   Final Result         1.  5.7 mm left ureterovesicular junction stone results in left urinary outflow tract obstructive change.   2.  Bilateral nephrolithiasis.   3.  Interstitial right lower lobe densities, concerning for interstitial infiltrate or edema, appears somewhat decreased since prior study.   4.  Diverticulosis   5.  Hepatomegaly          Results for orders placed or performed during the hospital encounter of 05/31/18   CBC WITH DIFFERENTIAL   Result Value Ref Range    WBC 11.2 (H) 4.8 - 10.8 K/uL    RBC 3.99 (L) 4.20 - 5.40 M/uL    Hemoglobin 12.8 12.0 - " 16.0 g/dL    Hematocrit 37.3 37.0 - 47.0 %    MCV 93.5 81.4 - 97.8 fL    MCH 32.1 27.0 - 33.0 pg    MCHC 34.3 33.6 - 35.0 g/dL    RDW 37.8 35.9 - 50.0 fL    Platelet Count 278 164 - 446 K/uL    MPV 10.1 9.0 - 12.9 fL    Neutrophils-Polys 74.20 (H) 44.00 - 72.00 %    Lymphocytes 16.30 (L) 22.00 - 41.00 %    Monocytes 6.80 0.00 - 13.40 %    Eosinophils 1.80 0.00 - 6.90 %    Basophils 0.50 0.00 - 1.80 %    Immature Granulocytes 0.40 0.00 - 0.90 %    Nucleated RBC 0.00 /100 WBC    Neutrophils (Absolute) 8.28 (H) 2.00 - 7.15 K/uL    Lymphs (Absolute) 1.82 1.00 - 4.80 K/uL    Monos (Absolute) 0.76 0.00 - 0.85 K/uL    Eos (Absolute) 0.20 0.00 - 0.51 K/uL    Baso (Absolute) 0.06 0.00 - 0.12 K/uL    Immature Granulocytes (abs) 0.05 0.00 - 0.11 K/uL    NRBC (Absolute) 0.00 K/uL   COMP METABOLIC PANEL   Result Value Ref Range    Sodium 137 135 - 145 mmol/L    Potassium 3.1 (L) 3.6 - 5.5 mmol/L    Chloride 105 96 - 112 mmol/L    Co2 24 20 - 33 mmol/L    Anion Gap 8.0 0.0 - 11.9    Glucose 110 (H) 65 - 99 mg/dL    Bun 17 8 - 22 mg/dL    Creatinine 0.85 0.50 - 1.40 mg/dL    Calcium 8.8 8.4 - 10.2 mg/dL    AST(SGOT) 15 12 - 45 U/L    ALT(SGPT) 8 2 - 50 U/L    Alkaline Phosphatase 61 30 - 99 U/L    Total Bilirubin 0.8 0.1 - 1.5 mg/dL    Albumin 3.5 3.2 - 4.9 g/dL    Total Protein 6.6 6.0 - 8.2 g/dL    Globulin 3.1 1.9 - 3.5 g/dL    A-G Ratio 1.1 g/dL   URINALYSIS CULTURE, IF INDICATED   Result Value Ref Range    Color Yellow     Character Cloudy (A)     Specific Gravity 1.015 <1.035    Ph 6.5 5.0 - 8.0    Glucose Negative Negative mg/dL    Ketones Negative Negative mg/dL    Protein 30 (A) Negative mg/dL    Bilirubin Small (A) Negative    Nitrite Positive (A) Negative    Leukocyte Esterase Negative Negative    Occult Blood Large (A) Negative    Micro Urine Req Microscopic    ESTIMATED GFR   Result Value Ref Range    GFR If African American >60 >60 mL/min/1.73 m 2    GFR If Non African American >60 >60 mL/min/1.73 m 2   URINE  MICROSCOPIC (W/UA)   Result Value Ref Range    WBC 0-2 /hpf    -150 (A) /hpf    Bacteria Few (A) None /hpf    Mucous Threads Few /hpf    Urine Crystals Few Amorphous /hpf        COURSE & MEDICAL DECISION MAKING  Pertinent Labs & Imaging studies reviewed. (See chart for details)  Patient is in severe pain. I suspect she's had recurrence of ureteral colic she will be treated with morphine fluids for dehydration Toradol and Zofran.  .    Patient had significant relief of her pain with the treatment above and dehydration was improved she has moist mucous membranes now.    Patient has no UTI but did have some nitrites she's had recent stent placement. I did contact urology Dr. walker. We are going to give patient a gram Rocephin. She does have now a distal UVJ stone on the left.    Patient is going to be discharged with 20 Norco. She has no abuse profile on her narcotics check opioid risk tool was assessed. She will be discharge with return to caution to follow-up    FINAL IMPRESSION  1.   1. Ureterolithiasis        2.   3.         Electronically signed by: Issac Gaviria, 5/31/2018 6:20 PM

## 2018-06-01 NOTE — ED NOTES
Patient continuously moaning and crying in pain - IV established, blood drawn and medicated for pain  Family at bedside

## 2018-06-01 NOTE — DISCHARGE INSTRUCTIONS
Kidney Stones  Kidney stones (urolithiasis) are solid, rock-like deposits that form inside of the organs that make urine (kidneys). A kidney stone may form in a kidney and move into the bladder, where it can cause intense pain and block the flow of urine. Kidney stones are created when high levels of certain minerals are found in the urine. They are usually passed through urination, but in some cases, medical treatment may be needed to remove them.  What are the causes?  Kidney stones may be caused by:  · A condition in which certain glands produce too much parathyroid hormone (primary hyperparathyroidism), which causes too much calcium buildup in the blood.  · Buildup of uric acid crystals in the bladder (hyperuricosuria). Uric acid is a chemical that the body produces when you eat certain foods. It usually exits the body in the urine.  · Narrowing (stricture) of one or both of the tubes that drain urine from the kidneys to the bladder (ureters).  · A kidney blockage that is present at birth (congenital obstruction).  · Past surgery on the kidney or the ureters, such as gastric bypass surgery.  What increases the risk?  The following factors make you more likely to develop kidney stones:  · Having had a kidney stone in the past.  · Having a family history of kidney stones.  · Not drinking enough water.  · Eating a diet that is high in protein, salt (sodium), or sugar.  · Being overweight or obese.  What are the signs or symptoms?  Symptoms of a kidney stone may include:  · Nausea.  · Vomiting.  · Blood in the urine (hematuria).  · Pain in the side of the abdomen, right below the ribs (flank pain). Pain usually spreads (radiates) to the groin.  · Needing to urinate frequently or urgently.  How is this diagnosed?  This condition may be diagnosed based on:  · Your medical history.  · A physical exam.  · Blood tests.  · Urine tests.  · CT scan.  · Abdominal X-ray.  · A procedure to examine the inside of the bladder  (cystoscopy).  How is this treated?  Treatment for kidney stones depends on the size, location, and makeup of the stones. Treatment may involve:  · Analyzing your urine before and after you pass the stone through urination.  · Being monitored at the hospital until you pass the stone through urination.  · Increasing your fluid intake and decreasing the amount of calcium and protein in your diet.  · A procedure to break up kidney stones in the bladder using:  ¨ A focused beam of light (laser therapy).  ¨ Shock waves (extracorporeal shock wave lithotripsy).  · Surgery to remove kidney stones. This may be needed if you have severe pain or have stones that block your urinary tract.  Follow these instructions at home:  Eating and drinking  · Drink enough fluid to keep your urine clear or pale yellow. This will help you to pass the kidney stone.  · If directed, change your diet. This may include:  ¨ Limiting how much sodium you eat.  ¨ Eating more fruits and vegetables.  ¨ Limiting how much meat, poultry, fish, and eggs you eat.  · Follow instructions from your health care provider about eating or drinking restrictions.  General instructions  · Collect urine samples as told by your health care provider. You may need to collect a urine sample:  ¨ 24 hours after you pass the stone.  ¨ 8-12 weeks after passing the kidney stone, and every 6-12 months after that.  · Strain your urine every time you urinate, for as long as directed. Use the strainer that your health care provider recommends.  · Do not throw out the kidney stone after passing it. Keep the stone so it can be tested by your health care provider. Testing the makeup of your kidney stone may help prevent you from getting kidney stones in the future.  · Take over-the-counter and prescription medicines only as told by your health care provider.  · Keep all follow-up visits as told by your health care provider. This is important. You may need follow-up X-rays or  ultrasounds to make sure that your stone has passed.  How is this prevented?  To prevent another kidney stone:  · Drink enough fluid to keep your urine clear or pale yellow. This is the best way to prevent kidney stones.  · Eat a healthy diet and follow recommendations from your health care provider about foods to avoid. You may be instructed to eat a low-protein diet. Recommendations vary depending on the type of kidney stone that you have.  · Maintain a healthy weight.  Contact a health care provider if:  · You have pain that gets worse or does not get better with medicine.  Get help right away if:  · You have a fever or chills.  · You develop severe pain.  · You develop new abdominal pain.  · You faint.  · You are unable to urinate.  This information is not intended to replace advice given to you by your health care provider. Make sure you discuss any questions you have with your health care provider.  Document Released: 12/18/2006 Document Revised: 07/07/2017 Document Reviewed: 06/02/2017  [employer] Interactive Patient Education © 2017 [employer]

## 2018-06-01 NOTE — ED NOTES
.Pt D/C to home. VSS. IV removed. D/C instructions and 1 prescriptions given to patient. Pt to f/u with urologist in am for f/u apt. Strainer given to pt. Pt verbalizes understanding. Pt leaves ED with no acute changes, complaints or concerns. Pt ambulated out with a steady gait and all belongings.

## 2018-06-01 NOTE — ED NOTES
"Per spouse, pt was able to nap this afternoon, woke in \"excruciating pain,\" referred to ED by Urologist.    "

## 2018-06-01 NOTE — ED NOTES
"Chief Complaint   Patient presents with   • Flank Pain     stents removed today     /63   Pulse 91   Temp 37.6 °C (99.7 °F)   Resp 16   Ht 1.715 m (5' 7.5\")   Wt 59 kg (130 lb 1.1 oz)   LMP 09/27/2008   SpO2 98%   BMI 20.07 kg/m²     "

## 2018-06-07 ENCOUNTER — HOSPITAL ENCOUNTER (OUTPATIENT)
Dept: RADIOLOGY | Facility: MEDICAL CENTER | Age: 48
End: 2018-06-07
Attending: UROLOGY
Payer: COMMERCIAL

## 2018-06-07 DIAGNOSIS — N20.0 KIDNEY STONE: ICD-10-CM

## 2018-06-07 PROCEDURE — 74018 RADEX ABDOMEN 1 VIEW: CPT

## 2018-06-21 ENCOUNTER — APPOINTMENT (OUTPATIENT)
Dept: MEDICAL GROUP | Facility: MEDICAL CENTER | Age: 48
End: 2018-06-21
Payer: COMMERCIAL

## 2018-07-16 RX ORDER — PAROXETINE 10 MG/1
10 TABLET, FILM COATED ORAL DAILY
Qty: 30 TAB | Refills: 0 | Status: SHIPPED | OUTPATIENT
Start: 2018-07-16 | End: 2018-07-20 | Stop reason: SDUPTHER

## 2018-07-20 ENCOUNTER — OFFICE VISIT (OUTPATIENT)
Dept: MEDICAL GROUP | Facility: MEDICAL CENTER | Age: 48
End: 2018-07-20
Payer: COMMERCIAL

## 2018-07-20 VITALS
WEIGHT: 131.4 LBS | DIASTOLIC BLOOD PRESSURE: 62 MMHG | HEIGHT: 67 IN | HEART RATE: 72 BPM | TEMPERATURE: 98.8 F | BODY MASS INDEX: 20.62 KG/M2 | OXYGEN SATURATION: 96 % | SYSTOLIC BLOOD PRESSURE: 118 MMHG

## 2018-07-20 DIAGNOSIS — Z12.39 SCREENING FOR MALIGNANT NEOPLASM OF BREAST: ICD-10-CM

## 2018-07-20 DIAGNOSIS — F33.40 MDD (RECURRENT MAJOR DEPRESSIVE DISORDER) IN REMISSION (HCC): ICD-10-CM

## 2018-07-20 DIAGNOSIS — F41.1 GAD (GENERALIZED ANXIETY DISORDER): ICD-10-CM

## 2018-07-20 DIAGNOSIS — N20.0 NEPHROLITHIASIS: ICD-10-CM

## 2018-07-20 DIAGNOSIS — Z72.0 TOBACCO USE: ICD-10-CM

## 2018-07-20 DIAGNOSIS — Z12.11 COLON CANCER SCREENING: ICD-10-CM

## 2018-07-20 PROCEDURE — 99214 OFFICE O/P EST MOD 30 MIN: CPT | Performed by: INTERNAL MEDICINE

## 2018-07-20 RX ORDER — TAMSULOSIN HYDROCHLORIDE 0.4 MG/1
0.4 CAPSULE ORAL DAILY
Qty: 90 CAP | Refills: 0 | Status: ON HOLD | OUTPATIENT
Start: 2018-07-20 | End: 2019-09-27

## 2018-07-20 RX ORDER — NICOTINE 21 MG/24HR
1 PATCH, TRANSDERMAL 24 HOURS TRANSDERMAL EVERY 24 HOURS
Qty: 30 PATCH | Refills: 2 | Status: SHIPPED | OUTPATIENT
Start: 2018-07-20 | End: 2019-05-14

## 2018-07-20 RX ORDER — PAROXETINE 10 MG/1
10 TABLET, FILM COATED ORAL DAILY
Qty: 90 TAB | Refills: 1 | Status: SHIPPED | OUTPATIENT
Start: 2018-07-20 | End: 2020-07-08 | Stop reason: SDUPTHER

## 2018-07-20 ASSESSMENT — PATIENT HEALTH QUESTIONNAIRE - PHQ9
8. MOVING OR SPEAKING SO SLOWLY THAT OTHER PEOPLE COULD HAVE NOTICED. OR THE OPPOSITE, BEING SO FIGETY OR RESTLESS THAT YOU HAVE BEEN MOVING AROUND A LOT MORE THAN USUAL: 0
3. TROUBLE FALLING OR STAYING ASLEEP OR SLEEPING TOO MUCH: 1
9. THOUGHTS THAT YOU WOULD BE BETTER OFF DEAD, OR OF HURTING YOURSELF: 0
1. LITTLE INTEREST OR PLEASURE IN DOING THINGS: 1
2. FEELING DOWN, DEPRESSED, IRRITABLE, OR HOPELESS: 0
5. POOR APPETITE OR OVEREATING: 0
SUM OF ALL RESPONSES TO PHQ9 QUESTIONS 1 AND 2: 1
6. FEELING BAD ABOUT YOURSELF - OR THAT YOU ARE A FAILURE OR HAVE LET YOURSELF OR YOUR FAMILY DOWN: 0
SUM OF ALL RESPONSES TO PHQ QUESTIONS 1-9: 5
7. TROUBLE CONCENTRATING ON THINGS, SUCH AS READING THE NEWSPAPER OR WATCHING TELEVISION: 2
4. FEELING TIRED OR HAVING LITTLE ENERGY: 1
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
## 2018-07-23 ENCOUNTER — TELEPHONE (OUTPATIENT)
Dept: MEDICAL GROUP | Facility: MEDICAL CENTER | Age: 48
End: 2018-07-23

## 2018-07-23 NOTE — TELEPHONE ENCOUNTER
1. Caller Name: Rachel                                         Call Back Number: [phone] (home)       Patient approves a detailed voicemail message: no    Pt called stating that [facility] was asking her which doctor she was to see.  Referral does not specify. Called [facility] and they said that they would not need a doctor in specific.  Called pt to let her know and LM.

## 2018-07-27 ENCOUNTER — HOSPITAL ENCOUNTER (OUTPATIENT)
Facility: MEDICAL CENTER | Age: 48
End: 2018-07-27
Attending: INTERNAL MEDICINE
Payer: COMMERCIAL

## 2018-07-27 ENCOUNTER — OFFICE VISIT (OUTPATIENT)
Dept: MEDICAL GROUP | Facility: MEDICAL CENTER | Age: 48
End: 2018-07-27
Payer: COMMERCIAL

## 2018-07-27 VITALS
HEIGHT: 67 IN | DIASTOLIC BLOOD PRESSURE: 82 MMHG | HEART RATE: 90 BPM | WEIGHT: 130.18 LBS | TEMPERATURE: 97.7 F | BODY MASS INDEX: 20.43 KG/M2 | OXYGEN SATURATION: 99 % | SYSTOLIC BLOOD PRESSURE: 118 MMHG

## 2018-07-27 DIAGNOSIS — L98.9 SKIN LESION: ICD-10-CM

## 2018-07-27 DIAGNOSIS — D49.2 SKIN GROWTH: ICD-10-CM

## 2018-07-27 DIAGNOSIS — Z72.0 TOBACCO USE: ICD-10-CM

## 2018-07-27 PROCEDURE — 88305 TISSUE EXAM BY PATHOLOGIST: CPT

## 2018-07-27 PROCEDURE — 11200 RMVL SKIN TAGS UP TO&INC 15: CPT | Performed by: INTERNAL MEDICINE

## 2018-07-27 NOTE — PROGRESS NOTES
CHIEF COMPLAINT  Chief Complaint   Patient presents with   • Procedure     in office skin lesion removal     HPI  Patient is a 48 y.o. female patient who presents today for the following     Skin growth/lesion  The patient noticed growth of the skin, similar to skin tag for the last ~ 2 yrs on the lateral right thigh.   It was skin color.    No PMH or FG of skin cancer.     Reviewed PMH, PSH, FH, SH, ALL, HCM/IMM, no changes  Reviewed MEDS, no changes    Patient Active Problem List    Diagnosis Date Noted   • Colon cancer screening 07/20/2018   • Screening for malignant neoplasm of breast 07/20/2018   • Tobacco use 03/31/2016   • Macrocytosis without anemia 09/17/2015   • Insomnia 06/11/2015   • History of nephrolithiasis 06/11/2015   • Depression 06/12/2013     CURRENT MEDICATIONS  Current Outpatient Prescriptions   Medication Sig Dispense Refill   • tamsulosin (FLOMAX) 0.4 MG capsule Take 1 Cap by mouth every day. 90 Cap 0   • PARoxetine (PAXIL) 10 MG Tab Take 1 Tab by mouth every day. 90 Tab 1   • nicotine (NICODERM) 14 MG/24HR PATCH 24 HR Apply 1 Patch to skin as directed every 24 hours. 30 Patch 2   • ibuprofen (MOTRIN) 800 MG Tab Take 1 Tab by mouth every 8 hours as needed for Moderate Pain. 30 Tab 0     No current facility-administered medications for this visit.      ALLERGIES  Allergies: Pcn [penicillins]  PAST MEDICAL HISTORY  Past Medical History:   Diagnosis Date   • Macrocytosis 3/2014   • Depression    • Endometriosis    • Nephrolithiasis      SURGICAL HISTORY  She  has a past surgical history that includes abdominal hysterectomy total; meniscus repair; cystoscopy (Left, 5/25/2018); and ureteroscopy (Left, 5/25/2018).  SOCIAL HISTORY  Social History   Substance Use Topics   • Smoking status: Current Every Day Smoker     Packs/day: 0.10     Types: Cigarettes   • Smokeless tobacco: Never Used   • Alcohol use 0.6 oz/week     1 Standard drinks or equivalent per week      Comment: Occasionally     Social  "History     Social History Narrative    Lives with boyfriend.     Children: 1    Work: office     FAMILY HISTORY  Family History   Problem Relation Age of Onset   • Diabetes Mother    • Allergies Mother    • Hypertension Mother    • Hyperlipidemia Mother    • Other Father         unknown   • Genitourinary () Father    • Heart Disease Neg Hx    • Psychiatry Neg Hx    • Stroke Neg Hx    • Thyroid Neg Hx      Family Status   Relation Status   • Mo Alive   • Fa Other   • Neg Hx (Not Specified)     ROS   Constitutional: Negative for fever, chills.  HENT: Negative for congestion, sore throat.  Eyes: Negative for blurred vision.   Respiratory: Negative for cough, shortness of breath.  Cardiovascular: Negative for chest pain, palpitations.   Gastrointestinal: Negative for heartburn, nausea, abdominal pain.   Genitourinary: Negative for dysuria.  Musculoskeletal: Negative for back pain and joint pain.   Skin: as above.  Neuro: Negative for dizziness, weakness and headaches.   Endo/Heme/Allergies: Does not bruise/bleed easily.   Psychiatric/Behavioral: Controlled depression.    PHYSICAL EXAM   Blood pressure 118/82, pulse 90, temperature 36.5 °C (97.7 °F), height 1.702 m (5' 7\"), weight 59.1 kg (130 lb 2.9 oz), last menstrual period 09/27/2008, SpO2 99 %. Body mass index is 20.39 kg/m².  General:  NAD, well appearing  HEENT:   NC/AT, PERRLA, EOMI, TMs are clear. Oropharyngeal mucosa is pink,  without lesions;  no cervical / supraclavicular  lymphadenopathy, no thyromegaly.    Cardiovascular: RRR.   No m/r/g. No carotid bruits .      Lungs:   CTAB, no w/r/r, no respiratory distress.  Abdomen: Soft, NT/ND + BS; no suprapubic tenderness; no masses or hepatosplenomegaly.  Extremities:  2+ DP and radial pulses bilaterally.  No c/c/e.   Skin:  Warm, dry.    1 cm large skin growth on the right lateral thighs, appears as a large skin tag.   Neurologic: Alert & oriented x 3.  No focal deficits.  Psychiatric:  Affect normal, mood " normal, judgment normal.    LABS     Labs are reviewed and discussed with a patient  Lab Results   Component Value Date/Time    CHOLSTRLTOT 179 09/15/2015 08:24 AM     (H) 09/15/2015 08:24 AM    HDL 60 09/15/2015 08:24 AM    TRIGLYCERIDE 72 09/15/2015 08:24 AM       Lab Results   Component Value Date/Time    SODIUM 137 05/31/2018 06:42 PM    POTASSIUM 3.1 (L) 05/31/2018 06:42 PM    CHLORIDE 105 05/31/2018 06:42 PM    CO2 24 05/31/2018 06:42 PM    GLUCOSE 110 (H) 05/31/2018 06:42 PM    BUN 17 05/31/2018 06:42 PM    CREATININE 0.85 05/31/2018 06:42 PM     Lab Results   Component Value Date/Time    ALKPHOSPHAT 61 05/31/2018 06:42 PM    ASTSGOT 15 05/31/2018 06:42 PM    ALTSGPT 8 05/31/2018 06:42 PM    TBILIRUBIN 0.8 05/31/2018 06:42 PM      Lab Results   Component Value Date/Time    WBC 11.2 (H) 05/31/2018 06:42 PM    RBC 3.99 (L) 05/31/2018 06:42 PM    HEMOGLOBIN 12.8 05/31/2018 06:42 PM    HEMATOCRIT 37.3 05/31/2018 06:42 PM    MCV 93.5 05/31/2018 06:42 PM    MCH 32.1 05/31/2018 06:42 PM    MCHC 34.3 05/31/2018 06:42 PM    MPV 10.1 05/31/2018 06:42 PM    NEUTSPOLYS 74.20 (H) 05/31/2018 06:42 PM    LYMPHOCYTES 16.30 (L) 05/31/2018 06:42 PM    MONOCYTES 6.80 05/31/2018 06:42 PM    EOSINOPHILS 1.80 05/31/2018 06:42 PM    BASOPHILS 0.50 05/31/2018 06:42 PM      IMAGING     None    ASSESMENT AND PLAN        1. Skin lesion   - PATHOLOGY SPECIMEN; Future  2. Skin growth 1 cm;   - PATHOLOGY SPECIMEN; Future    Discussed with the patient the risks benefits alternatives to excision of the lesion. Informed consent was obtained and the patient consented to the procedure with risks to include bleeding and infection as well as damage to adjacent organs.   The area was identified with a marker.   The area was alcohol swabed and 1 cc of lidocaine with epi was administered.   The area was prepped and draped in the usual sterile fashion.   As the lesion appeared as a large skin tag, tried excision with a blade 1\#10/10 at a  skin level;  - as it was bleeding more than usual, made elliptical excision 2 x 1.5 cm, with 5 stitches.     The excised sample was placed in the pathology jar for pathology.    Hemostasis was obtained with heat-based cautery.   The area was then sutured closed with 3 sutures 3-0 in size.   Antibiotic ointment and a bandage was applied.   The patient was given wound care instructions.  The patient is to followup in 7 days for suture removal.    The patient was instructed to call if there was any excessive bleeding or followup sooner if there were any signs of infection to include pain, warmth, redness or purulence.    3. Tobacco use  Advised to quit smoking     Longer procedure than estimated due to bleeding and change the way of procedure    Counseling:   - Smoking:  Nonsmoker    Followup: Return if symptoms worsen or fail to improve.    All questions are answered.    Please note that this dictation was created using voice recognition software, and that there might be errors of romelia and possibly content.

## 2018-07-27 NOTE — PATIENT INSTRUCTIONS
Wound Care, Adult  Taking care of your wound properly can help to prevent pain and infection. It can also help your wound to heal more quickly.  How is this treated?  Wound care  · Follow instructions from your health care provider about how to take care of your wound. Make sure you:  ¨ Wash your hands with soap and water before you change the bandage (dressing). If soap and water are not available, use hand .  ¨ Change your dressing as told by your health care provider.  ¨ Leave stitches (sutures), skin glue, or adhesive strips in place. These skin closures may need to stay in place for 2 weeks or longer. If adhesive strip edges start to loosen and curl up, you may trim the loose edges. Do not remove adhesive strips completely unless your health care provider tells you to do that.  · Check your wound area every day for signs of infection. Check for:  ¨ More redness, swelling, or pain.  ¨ More fluid or blood.  ¨ Warmth.  ¨ Pus or a bad smell.  · Ask your health care provider if you should clean the wound with mild soap and water. Doing this may include:  ¨ Using a clean towel to pat the wound dry after cleaning it. Do not rub or scrub the wound.  ¨ Applying a cream or ointment. Do this only as told by your health care provider.  ¨ Covering the incision with a clean dressing.  · Ask your health care provider when you can leave the wound uncovered.  Medicines   · If you were prescribed an antibiotic medicine, cream, or ointment, take or use the antibiotic as told by your health care provider. Do not stop taking or using the antibiotic even if your condition improves.  · Take over-the-counter and prescription medicines only as told by your health care provider. If you were prescribed pain medicine, take it at least 30 minutes before doing any wound care or as told by your health care provider.  General instructions  · Return to your normal activities as told by your health care provider. Ask your health care  provider what activities are safe.  · Do not scratch or pick at the wound.  · Keep all follow-up visits as told by your health care provider. This is important.  · Eat a diet that includes protein, vitamin A, vitamin C, and other nutrient-rich foods. These help the wound heal:  ¨ Protein-rich foods include meat, dairy, beans, nuts, and other sources.  ¨ Vitamin A-rich foods include carrots and dark green, leafy vegetables.  ¨ Vitamin C-rich foods include citrus, tomatoes, and other fruits and vegetables.  ¨ Nutrient-rich foods have protein, carbohydrates, fat, vitamins, or minerals. Eat a variety of healthy foods including vegetables, fruits, and whole grains.  Contact a health care provider if:  · You received a tetanus shot and you have swelling, severe pain, redness, or bleeding at the injection site.  · Your pain is not controlled with medicine.  · You have more redness, swelling, or pain around the wound.  · You have more fluid or blood coming from the wound.  · Your wound feels warm to the touch.  · You have pus or a bad smell coming from the wound.  · You have a fever or chills.  · You are nauseous or you vomit.  · You are dizzy.  Get help right away if:  · You have a red streak going away from your wound.  · The edges of the wound open up and separate.  · Your wound is bleeding and the bleeding does not stop with gentle pressure.  · You have a rash.  · You faint.  · You have trouble breathing.  This information is not intended to replace advice given to you by your health care provider. Make sure you discuss any questions you have with your health care provider.  Document Released: 09/26/2009 Document Revised: 08/16/2017 Document Reviewed: 07/04/2017  [employer] Interactive Patient Education © 2017 [employer]

## 2018-08-02 ENCOUNTER — TELEPHONE (OUTPATIENT)
Dept: MEDICAL GROUP | Facility: MEDICAL CENTER | Age: 48
End: 2018-08-02

## 2018-08-02 NOTE — TELEPHONE ENCOUNTER
Phone Number Called: [phone] (home)     Message: RICKI for pt. Pt was asking about referral in last OV.  Contacted referral dept and they referred her to the only urology office in [place] but if she does not want the doctor that they referred her to, she can ask to be seen by a different doctor within that office. Referral was written for office, not a specific doctor.    Left Message for patient to call back: yes

## 2018-08-03 ENCOUNTER — NON-PROVIDER VISIT (OUTPATIENT)
Dept: MEDICAL GROUP | Facility: MEDICAL CENTER | Age: 48
End: 2018-08-03
Payer: COMMERCIAL

## 2018-08-03 NOTE — NON-PROVIDER
Rachel Jacobs is a 48 y.o. female here for a Non-Provider Visit for Suture Removal.    Sutures were placed by Dr. Juliano Degroot MD on date: 7/27/18  Skin is healed: Yes  Provider notified if skin is not healed, or if there is redness, heat, pain, or drainage from incision: yes  Sutures removed.   Mastisol and steristips are placed: Yes    Advised to use emollient (vaseline, aquaphor, etc.) as needed, avoid peroxide and antibiotic ointment to reduce irritation.     Path report has been reviewed by provider.  Path report has been reviewed with patient.

## 2018-08-28 ENCOUNTER — TELEPHONE (OUTPATIENT)
Dept: MEDICAL GROUP | Facility: MEDICAL CENTER | Age: 48
End: 2018-08-28

## 2018-08-28 NOTE — TELEPHONE ENCOUNTER
1. Caller Name: Rachel                                         Call Back Number: [phone] (home)       Patient approves a detailed voicemail message: no    Pt called regarding FMLA. Pt stated that her work required that she went on FMLA for the 2 day prep for her surgery and needed Dr. Degroot to sign the paperwork. Dr. Degroot said that this is not a reason to go on FMLA and she will not sign it. Pt stated she is going to try and contact Dr. Momin's office to see if they will sign.  Pt will lose job if this is not signed.

## 2018-09-04 DIAGNOSIS — F33.0 MAJOR DEPRESSIVE DISORDER, RECURRENT EPISODE, MILD (HCC): ICD-10-CM

## 2018-09-05 RX ORDER — PAROXETINE 10 MG/1
TABLET, FILM COATED ORAL
Qty: 60 TAB | Refills: 0 | Status: SHIPPED | OUTPATIENT
Start: 2018-09-05 | End: 2018-11-09 | Stop reason: SDUPTHER

## 2018-11-09 DIAGNOSIS — F33.0 MAJOR DEPRESSIVE DISORDER, RECURRENT EPISODE, MILD (HCC): ICD-10-CM

## 2018-11-09 RX ORDER — PAROXETINE 10 MG/1
TABLET, FILM COATED ORAL
Qty: 60 TAB | Refills: 0 | Status: SHIPPED | OUTPATIENT
Start: 2018-11-09 | End: 2019-01-21 | Stop reason: SDUPTHER

## 2019-01-21 DIAGNOSIS — F33.0 MAJOR DEPRESSIVE DISORDER, RECURRENT EPISODE, MILD (HCC): ICD-10-CM

## 2019-01-21 RX ORDER — PAROXETINE 10 MG/1
TABLET, FILM COATED ORAL
Qty: 60 TAB | Refills: 0 | Status: SHIPPED | OUTPATIENT
Start: 2019-01-21 | End: 2019-04-26 | Stop reason: SDUPTHER

## 2019-04-26 ENCOUNTER — TELEPHONE (OUTPATIENT)
Dept: MEDICAL GROUP | Facility: MEDICAL CENTER | Age: 49
End: 2019-04-26

## 2019-04-26 DIAGNOSIS — F33.0 MAJOR DEPRESSIVE DISORDER, RECURRENT EPISODE, MILD (HCC): ICD-10-CM

## 2019-04-26 RX ORDER — PAROXETINE 10 MG/1
TABLET, FILM COATED ORAL
Qty: 15 TAB | Refills: 0 | Status: SHIPPED | OUTPATIENT
Start: 2019-04-26 | End: 2019-05-13 | Stop reason: SDUPTHER

## 2019-04-26 NOTE — TELEPHONE ENCOUNTER
Patient states that she is on mandatory overtime at her job and is unable to come in to see us until it is done. Patient is out of her paroxetine and is very upset. She would like to get a 30 day supply at least until she can get in.     Please advise.     Thank you,     Ayanna Sykes  Medical Assistant

## 2019-05-13 ENCOUNTER — OFFICE VISIT (OUTPATIENT)
Dept: MEDICAL GROUP | Facility: MEDICAL CENTER | Age: 49
End: 2019-05-13
Payer: COMMERCIAL

## 2019-05-13 VITALS
OXYGEN SATURATION: 97 % | BODY MASS INDEX: 20.38 KG/M2 | SYSTOLIC BLOOD PRESSURE: 95 MMHG | HEART RATE: 74 BPM | DIASTOLIC BLOOD PRESSURE: 65 MMHG | TEMPERATURE: 97.5 F | HEIGHT: 68 IN | RESPIRATION RATE: 14 BRPM | WEIGHT: 134.48 LBS

## 2019-05-13 DIAGNOSIS — Z12.11 SCREENING FOR MALIGNANT NEOPLASM OF COLON: ICD-10-CM

## 2019-05-13 DIAGNOSIS — F51.01 PRIMARY INSOMNIA: ICD-10-CM

## 2019-05-13 DIAGNOSIS — Z12.31 ENCOUNTER FOR SCREENING MAMMOGRAM FOR MALIGNANT NEOPLASM OF BREAST: ICD-10-CM

## 2019-05-13 DIAGNOSIS — F32.5 MAJOR DEPRESSION IN REMISSION (HCC): ICD-10-CM

## 2019-05-13 DIAGNOSIS — Z72.0 TOBACCO USE: ICD-10-CM

## 2019-05-13 DIAGNOSIS — K63.5 POLYP OF COLON, UNSPECIFIED PART OF COLON, UNSPECIFIED TYPE: ICD-10-CM

## 2019-05-13 DIAGNOSIS — F41.1 GAD (GENERALIZED ANXIETY DISORDER): ICD-10-CM

## 2019-05-13 DIAGNOSIS — D75.89 MACROCYTOSIS WITHOUT ANEMIA: ICD-10-CM

## 2019-05-13 PROCEDURE — 99214 OFFICE O/P EST MOD 30 MIN: CPT | Performed by: INTERNAL MEDICINE

## 2019-05-13 RX ORDER — PAROXETINE 10 MG/1
TABLET, FILM COATED ORAL
Qty: 90 TAB | Refills: 1 | Status: SHIPPED | OUTPATIENT
Start: 2019-05-13 | End: 2019-09-03 | Stop reason: SDUPTHER

## 2019-05-13 ASSESSMENT — PATIENT HEALTH QUESTIONNAIRE - PHQ9
9. THOUGHTS THAT YOU WOULD BE BETTER OFF DEAD, OR OF HURTING YOURSELF: 0
SUM OF ALL RESPONSES TO PHQ QUESTIONS 1-9: 5
SUM OF ALL RESPONSES TO PHQ9 QUESTIONS 1 AND 2: 1
1. LITTLE INTEREST OR PLEASURE IN DOING THINGS: 0
6. FEELING BAD ABOUT YOURSELF - OR THAT YOU ARE A FAILURE OR HAVE LET YOURSELF OR YOUR FAMILY DOWN: 0
4. FEELING TIRED OR HAVING LITTLE ENERGY: 1
7. TROUBLE CONCENTRATING ON THINGS, SUCH AS READING THE NEWSPAPER OR WATCHING TELEVISION: 1
5. POOR APPETITE OR OVEREATING: 1
2. FEELING DOWN, DEPRESSED, IRRITABLE, OR HOPELESS: 1
8. MOVING OR SPEAKING SO SLOWLY THAT OTHER PEOPLE COULD HAVE NOTICED. OR THE OPPOSITE, BEING SO FIGETY OR RESTLESS THAT YOU HAVE BEEN MOVING AROUND A LOT MORE THAN USUAL: 0
3. TROUBLE FALLING OR STAYING ASLEEP OR SLEEPING TOO MUCH: 1

## 2019-05-14 PROBLEM — K63.5 POLYP OF COLON: Status: ACTIVE | Noted: 2019-05-14
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
## 2019-09-03 DIAGNOSIS — F32.5 MAJOR DEPRESSION IN REMISSION (HCC): ICD-10-CM

## 2019-09-03 DIAGNOSIS — F41.1 GAD (GENERALIZED ANXIETY DISORDER): ICD-10-CM

## 2019-09-03 DIAGNOSIS — F51.01 PRIMARY INSOMNIA: ICD-10-CM

## 2019-09-03 RX ORDER — PAROXETINE 10 MG/1
TABLET, FILM COATED ORAL
Qty: 90 TAB | Refills: 0 | Status: ON HOLD | OUTPATIENT
Start: 2019-09-03 | End: 2019-09-27

## 2019-09-19 ENCOUNTER — HOSPITAL ENCOUNTER (OUTPATIENT)
Dept: LAB | Facility: MEDICAL CENTER | Age: 49
End: 2019-09-19
Attending: SURGERY
Payer: COMMERCIAL

## 2019-09-19 LAB
ANION GAP SERPL CALC-SCNC: 10 MMOL/L (ref 0–11.9)
BASOPHILS # BLD AUTO: 0.8 % (ref 0–1.8)
BASOPHILS # BLD: 0.07 K/UL (ref 0–0.12)
BUN SERPL-MCNC: 16 MG/DL (ref 8–22)
CA-I SERPL-SCNC: 1.2 MMOL/L (ref 1.1–1.3)
CALCIUM SERPL-MCNC: 10 MG/DL (ref 8.5–10.5)
CHLORIDE SERPL-SCNC: 106 MMOL/L (ref 96–112)
CO2 SERPL-SCNC: 25 MMOL/L (ref 20–33)
CREAT SERPL-MCNC: 0.73 MG/DL (ref 0.5–1.4)
EOSINOPHIL # BLD AUTO: 0.21 K/UL (ref 0–0.51)
EOSINOPHIL NFR BLD: 2.4 % (ref 0–6.9)
ERYTHROCYTE [DISTWIDTH] IN BLOOD BY AUTOMATED COUNT: 43.9 FL (ref 35.9–50)
GLUCOSE SERPL-MCNC: 87 MG/DL (ref 65–99)
HCT VFR BLD AUTO: 45 % (ref 37–47)
HGB BLD-MCNC: 14.9 G/DL (ref 12–16)
HIV 1+2 AB+HIV1 P24 AG SERPL QL IA: NON REACTIVE
IMM GRANULOCYTES # BLD AUTO: 0.03 K/UL (ref 0–0.11)
IMM GRANULOCYTES NFR BLD AUTO: 0.3 % (ref 0–0.9)
LYMPHOCYTES # BLD AUTO: 3.04 K/UL (ref 1–4.8)
LYMPHOCYTES NFR BLD: 35 % (ref 22–41)
MCH RBC QN AUTO: 33.1 PG (ref 27–33)
MCHC RBC AUTO-ENTMCNC: 33.1 G/DL (ref 33.6–35)
MCV RBC AUTO: 100 FL (ref 81.4–97.8)
MONOCYTES # BLD AUTO: 0.62 K/UL (ref 0–0.85)
MONOCYTES NFR BLD AUTO: 7.1 % (ref 0–13.4)
NEUTROPHILS # BLD AUTO: 4.71 K/UL (ref 2–7.15)
NEUTROPHILS NFR BLD: 54.4 % (ref 44–72)
NRBC # BLD AUTO: 0 K/UL
NRBC BLD-RTO: 0 /100 WBC
PLATELET # BLD AUTO: 223 K/UL (ref 164–446)
PMV BLD AUTO: 11 FL (ref 9–12.9)
POTASSIUM SERPL-SCNC: 3.6 MMOL/L (ref 3.6–5.5)
RBC # BLD AUTO: 4.5 M/UL (ref 4.2–5.4)
SODIUM SERPL-SCNC: 141 MMOL/L (ref 135–145)
WBC # BLD AUTO: 8.7 K/UL (ref 4.8–10.8)

## 2019-09-19 PROCEDURE — 80048 BASIC METABOLIC PNL TOTAL CA: CPT

## 2019-09-19 PROCEDURE — 87389 HIV-1 AG W/HIV-1&-2 AB AG IA: CPT

## 2019-09-19 PROCEDURE — 85025 COMPLETE CBC W/AUTO DIFF WBC: CPT

## 2019-09-19 PROCEDURE — 82330 ASSAY OF CALCIUM: CPT

## 2019-09-19 PROCEDURE — 36415 COLL VENOUS BLD VENIPUNCTURE: CPT

## 2019-09-26 ENCOUNTER — ANESTHESIA EVENT (OUTPATIENT)
Dept: SURGERY | Facility: MEDICAL CENTER | Age: 49
End: 2019-09-26
Payer: COMMERCIAL

## 2019-09-27 ENCOUNTER — ANESTHESIA (OUTPATIENT)
Dept: SURGERY | Facility: MEDICAL CENTER | Age: 49
End: 2019-09-27
Payer: COMMERCIAL

## 2019-09-27 ENCOUNTER — HOSPITAL ENCOUNTER (OUTPATIENT)
Facility: MEDICAL CENTER | Age: 49
End: 2019-09-27
Attending: SURGERY | Admitting: SURGERY
Payer: COMMERCIAL

## 2019-09-27 VITALS
SYSTOLIC BLOOD PRESSURE: 96 MMHG | DIASTOLIC BLOOD PRESSURE: 40 MMHG | RESPIRATION RATE: 15 BRPM | HEIGHT: 67 IN | TEMPERATURE: 96.7 F | HEART RATE: 63 BPM | BODY MASS INDEX: 21.04 KG/M2 | OXYGEN SATURATION: 97 % | WEIGHT: 134.04 LBS

## 2019-09-27 DIAGNOSIS — G89.18 POST-OPERATIVE PAIN: ICD-10-CM

## 2019-09-27 PROBLEM — K62.82 AIN GRADE I: Chronic | Status: ACTIVE | Noted: 2019-09-27

## 2019-09-27 LAB — PATHOLOGY CONSULT NOTE: NORMAL

## 2019-09-27 PROCEDURE — 160035 HCHG PACU - 1ST 60 MINS PHASE I: Performed by: SURGERY

## 2019-09-27 PROCEDURE — 160038 HCHG SURGERY MINUTES - EA ADDL 1 MIN LEVEL 2: Performed by: SURGERY

## 2019-09-27 PROCEDURE — 88305 TISSUE EXAM BY PATHOLOGIST: CPT

## 2019-09-27 PROCEDURE — 700111 HCHG RX REV CODE 636 W/ 250 OVERRIDE (IP): Performed by: ANESTHESIOLOGY

## 2019-09-27 PROCEDURE — 160009 HCHG ANES TIME/MIN: Performed by: SURGERY

## 2019-09-27 PROCEDURE — 160025 RECOVERY II MINUTES (STATS): Performed by: SURGERY

## 2019-09-27 PROCEDURE — 160046 HCHG PACU - 1ST 60 MINS PHASE II: Performed by: SURGERY

## 2019-09-27 PROCEDURE — 160036 HCHG PACU - EA ADDL 30 MINS PHASE I: Performed by: SURGERY

## 2019-09-27 PROCEDURE — A6403 STERILE GAUZE>16 <= 48 SQ IN: HCPCS | Performed by: SURGERY

## 2019-09-27 PROCEDURE — 700105 HCHG RX REV CODE 258: Performed by: SURGERY

## 2019-09-27 PROCEDURE — A9270 NON-COVERED ITEM OR SERVICE: HCPCS | Performed by: ANESTHESIOLOGY

## 2019-09-27 PROCEDURE — 160048 HCHG OR STATISTICAL LEVEL 1-5: Performed by: SURGERY

## 2019-09-27 PROCEDURE — 160027 HCHG SURGERY MINUTES - 1ST 30 MINS LEVEL 2: Performed by: SURGERY

## 2019-09-27 PROCEDURE — 700102 HCHG RX REV CODE 250 W/ 637 OVERRIDE(OP): Performed by: ANESTHESIOLOGY

## 2019-09-27 PROCEDURE — 500423 HCHG DRESSING, ABD COMBINE: Performed by: SURGERY

## 2019-09-27 PROCEDURE — 700101 HCHG RX REV CODE 250: Performed by: SURGERY

## 2019-09-27 PROCEDURE — 160002 HCHG RECOVERY MINUTES (STAT): Performed by: SURGERY

## 2019-09-27 PROCEDURE — 501838 HCHG SUTURE GENERAL: Performed by: SURGERY

## 2019-09-27 RX ORDER — HYDROMORPHONE HYDROCHLORIDE 1 MG/ML
0.1 INJECTION, SOLUTION INTRAMUSCULAR; INTRAVENOUS; SUBCUTANEOUS
Status: DISCONTINUED | OUTPATIENT
Start: 2019-09-27 | End: 2019-09-27 | Stop reason: HOSPADM

## 2019-09-27 RX ORDER — DEXAMETHASONE SODIUM PHOSPHATE 4 MG/ML
INJECTION, SOLUTION INTRA-ARTICULAR; INTRALESIONAL; INTRAMUSCULAR; INTRAVENOUS; SOFT TISSUE PRN
Status: DISCONTINUED | OUTPATIENT
Start: 2019-09-27 | End: 2019-09-27 | Stop reason: SURG

## 2019-09-27 RX ORDER — OXYCODONE HCL 5 MG/5 ML
10 SOLUTION, ORAL ORAL
Status: DISCONTINUED | OUTPATIENT
Start: 2019-09-27 | End: 2019-09-27 | Stop reason: HOSPADM

## 2019-09-27 RX ORDER — SUCCINYLCHOLINE CHLORIDE 20 MG/ML
INJECTION INTRAMUSCULAR; INTRAVENOUS PRN
Status: DISCONTINUED | OUTPATIENT
Start: 2019-09-27 | End: 2019-09-27 | Stop reason: SURG

## 2019-09-27 RX ORDER — OXYCODONE HYDROCHLORIDE AND ACETAMINOPHEN 5; 325 MG/1; MG/1
1 TABLET ORAL EVERY 6 HOURS PRN
Qty: 20 TAB | Refills: 0 | Status: SHIPPED | OUTPATIENT
Start: 2019-09-27 | End: 2019-10-02

## 2019-09-27 RX ORDER — BUPIVACAINE HYDROCHLORIDE AND EPINEPHRINE 5; 5 MG/ML; UG/ML
INJECTION, SOLUTION EPIDURAL; INTRACAUDAL; PERINEURAL
Status: DISCONTINUED | OUTPATIENT
Start: 2019-09-27 | End: 2019-09-27 | Stop reason: HOSPADM

## 2019-09-27 RX ORDER — CHLORAL HYDRATE 500 MG
1000 CAPSULE ORAL DAILY
COMMUNITY
End: 2020-08-19

## 2019-09-27 RX ORDER — OXYCODONE HCL 5 MG/5 ML
5 SOLUTION, ORAL ORAL
Status: DISCONTINUED | OUTPATIENT
Start: 2019-09-27 | End: 2019-09-27 | Stop reason: HOSPADM

## 2019-09-27 RX ORDER — DIPHENHYDRAMINE HYDROCHLORIDE 50 MG/ML
12.5 INJECTION INTRAMUSCULAR; INTRAVENOUS
Status: DISCONTINUED | OUTPATIENT
Start: 2019-09-27 | End: 2019-09-27 | Stop reason: HOSPADM

## 2019-09-27 RX ORDER — GABAPENTIN 300 MG/1
300 CAPSULE ORAL ONCE
Status: COMPLETED | OUTPATIENT
Start: 2019-09-27 | End: 2019-09-27

## 2019-09-27 RX ORDER — HALOPERIDOL 5 MG/ML
1 INJECTION INTRAMUSCULAR
Status: DISCONTINUED | OUTPATIENT
Start: 2019-09-27 | End: 2019-09-27 | Stop reason: HOSPADM

## 2019-09-27 RX ORDER — SODIUM CHLORIDE, SODIUM LACTATE, POTASSIUM CHLORIDE, CALCIUM CHLORIDE 600; 310; 30; 20 MG/100ML; MG/100ML; MG/100ML; MG/100ML
INJECTION, SOLUTION INTRAVENOUS CONTINUOUS
Status: DISCONTINUED | OUTPATIENT
Start: 2019-09-27 | End: 2019-09-27 | Stop reason: HOSPADM

## 2019-09-27 RX ORDER — HYDROMORPHONE HYDROCHLORIDE 1 MG/ML
0.2 INJECTION, SOLUTION INTRAMUSCULAR; INTRAVENOUS; SUBCUTANEOUS
Status: DISCONTINUED | OUTPATIENT
Start: 2019-09-27 | End: 2019-09-27 | Stop reason: HOSPADM

## 2019-09-27 RX ORDER — MIDAZOLAM HYDROCHLORIDE 1 MG/ML
INJECTION INTRAMUSCULAR; INTRAVENOUS PRN
Status: DISCONTINUED | OUTPATIENT
Start: 2019-09-27 | End: 2019-09-27 | Stop reason: SURG

## 2019-09-27 RX ORDER — HYDROMORPHONE HYDROCHLORIDE 1 MG/ML
0.4 INJECTION, SOLUTION INTRAMUSCULAR; INTRAVENOUS; SUBCUTANEOUS
Status: DISCONTINUED | OUTPATIENT
Start: 2019-09-27 | End: 2019-09-27 | Stop reason: HOSPADM

## 2019-09-27 RX ORDER — ONDANSETRON 2 MG/ML
INJECTION INTRAMUSCULAR; INTRAVENOUS PRN
Status: DISCONTINUED | OUTPATIENT
Start: 2019-09-27 | End: 2019-09-27 | Stop reason: SURG

## 2019-09-27 RX ORDER — ACETAMINOPHEN 500 MG
1000 TABLET ORAL ONCE
Status: COMPLETED | OUTPATIENT
Start: 2019-09-27 | End: 2019-09-27

## 2019-09-27 RX ADMIN — SUCCINYLCHOLINE CHLORIDE 100 MG: 20 INJECTION, SOLUTION INTRAMUSCULAR; INTRAVENOUS at 08:30

## 2019-09-27 RX ADMIN — LIDOCAINE HYDROCHLORIDE 0.5 ML: 10 INJECTION, SOLUTION EPIDURAL; INFILTRATION; INTRACAUDAL at 07:19

## 2019-09-27 RX ADMIN — ONDANSETRON 4 MG: 2 INJECTION INTRAMUSCULAR; INTRAVENOUS at 08:42

## 2019-09-27 RX ADMIN — PROPOFOL 100 MG: 10 INJECTION, EMULSION INTRAVENOUS at 08:39

## 2019-09-27 RX ADMIN — ACETAMINOPHEN 1000 MG: 500 TABLET ORAL at 07:20

## 2019-09-27 RX ADMIN — GABAPENTIN 300 MG: 300 CAPSULE ORAL at 07:20

## 2019-09-27 RX ADMIN — SODIUM CHLORIDE, POTASSIUM CHLORIDE, SODIUM LACTATE AND CALCIUM CHLORIDE: 600; 310; 30; 20 INJECTION, SOLUTION INTRAVENOUS at 08:26

## 2019-09-27 RX ADMIN — FENTANYL CITRATE 100 MCG: 50 INJECTION, SOLUTION INTRAMUSCULAR; INTRAVENOUS at 08:30

## 2019-09-27 RX ADMIN — MIDAZOLAM 2 MG: 1 INJECTION INTRAMUSCULAR; INTRAVENOUS at 08:26

## 2019-09-27 RX ADMIN — DEXAMETHASONE SODIUM PHOSPHATE 4 MG: 4 INJECTION, SOLUTION INTRA-ARTICULAR; INTRALESIONAL; INTRAMUSCULAR; INTRAVENOUS; SOFT TISSUE at 08:42

## 2019-09-27 RX ADMIN — PROPOFOL 200 MG: 10 INJECTION, EMULSION INTRAVENOUS at 08:30

## 2019-09-27 NOTE — ANESTHESIA TIME REPORT
Anesthesia Start and Stop Event Times     Date Time Event    9/27/2019 0815 Ready for Procedure     0826 Anesthesia Start     0914 Anesthesia Stop        Responsible Staff  09/27/19    Name Role Begin End    Samuel Cui M.D. Anesth 0826 0914        Preop Diagnosis (Free Text):  Pre-op Diagnosis     CONDYLOMA        Preop Diagnosis (Codes):    Post op Diagnosis  Condyloma acuminatum of perianal region      Premium Reason  Non-Premium    Comments:

## 2019-09-27 NOTE — PROGRESS NOTES
1117- Patient arrived in PACU II alert and oriented. Vital signs are within normal limits for the patient. Patient reports pain 0/10. Dressing is clean, dry, and intact. Discussed discharge plan of care and patient expressed understanding. All needs met at this time.    1120-Patient ambulated to the restroom to void. Patient voided successfully.    1125- Patient feels ready to be discharged and meets discharge criteria set by Dr. Lim. Patient is going to get dressed.    1135- Provided patient with discharge education with friend, Gloria, at bedside. All questions answered.    1150- PIV removed and patient discharged home via wheelchair.

## 2019-09-27 NOTE — DISCHARGE INSTRUCTIONS
ACTIVITY: Rest and take it easy for the first 24 hours.  A responsible adult is recommended to remain with you during that time.  It is normal to feel sleepy.  We encourage you to not do anything that requires balance, judgment or coordination.    MILD FLU-LIKE SYMPTOMS ARE NORMAL. YOU MAY EXPERIENCE GENERALIZED MUSCLE ACHES, THROAT IRRITATION, HEADACHE AND/OR SOME NAUSEA.    FOR 24 HOURS DO NOT:  Drive, operate machinery or run household appliances.  Drink beer or alcoholic beverages.   Make important decisions or sign legal documents.    SPECIAL INSTRUCTIONS:   1.  Carefully remove bandage on the day after surgery. You may shower. Good hygiene is essential for proper healing.  Wearing soft gauze pads or sanitary napkins in your underwear helps to control fluid drainage, discharge of mucous, and bleeding. Change pads and underwear frequently.  2. There are no specific dietary restrictions associated with this surgery. Avoid foods which make you constipated. Be sure to include wheat bran, fresh fruits and plenty of vegetables in your diet. Drink plenty of water.  3. Avoid straining, especially a few days after surgery, when you may experience swelling that feels like an incompletely passed stool or a hemorrhoid. Milk of magnesia should be taken every day starting day of surgery until your first bowel movement. Additionally, we advise that you use an over-the-counter stool softener, such as Colace. Please call our office if no bowel movement within 72 hours.  4. Moderate exercise is healthy. You may walk as much as you like. You are permitted to go up and down stairs slowly while using a handrail. Resume normal activities after first postoperative clinic visit. Avoid heavy lifting or strenuous activity until that time.  5. You may drive when you are comfortable enough to react and move quickly in an emergency (usually 1-2 weeks after surgery). Long trips over 25 miles are not advised. Do not drive if you are taking  prescription pain medication.  6. Return to work when you feel you are able o work with the restrictions as noted above.  7. For pain, one of the Ibuprofen compounds (Advil, Nuprin, etc.) or Tylenol is suggested. Should these not be effective in managing your discomfort, prescription pain medications should be used sparingly to avoid constipation.   8. Please call the office to report any of the following:  Temperature of 101 or higher.  Signs of infection, possibly including increasing redness, warmth,   tenderness, drainage, odor at incision site  Persistent moderate to severe pain  No bowel movement within 72 hours from OR date.     DIET: To avoid nausea, slowly advance diet as tolerated, avoiding spicy or greasy foods for the first day.  Add more substantial food to your diet according to your physician's instructions.  INCREASE FLUIDS AND FIBER TO AVOID CONSTIPATION.    SURGICAL DRESSING/BATHING: see above instructions    FOLLOW-UP APPOINTMENT:  A follow-up appointment should be arranged with your doctor; call to schedule.    You should CALL YOUR PHYSICIAN if you develop:  Fever greater than 101 degrees F.  Pain not relieved by medication, or persistent nausea or vomiting.  Excessive bleeding (blood soaking through dressing) or unexpected drainage from the wound.  Extreme redness or swelling around the incision site, drainage of pus or foul smelling drainage.  Inability to urinate or empty your bladder within 8 hours.  Problems with breathing or chest pain.    You should call 911 if you develop problems with breathing or chest pain.  If you are unable to contact your doctor or surgical center, you should go to the nearest emergency room or urgent care center.  Physician's telephone #: Dr. Lim [phone]    If any questions arise, call your doctor.  If your doctor is not available, please feel free to call the Surgical Center at [phone].  The Center is open Monday through Friday from 7AM to 7PM.  You  can also call the HEALTH HOTLINE open 24 hours/day, 7 days/week and speak to a nurse at [phone], or toll free at [phone].    A registered nurse may call you a few days after your surgery to see how you are doing after your procedure.    MEDICATIONS: Resume taking daily medication.  Take prescribed pain medication with food.  If no medication is prescribed, you may take non-aspirin pain medication if needed.  PAIN MEDICATION CAN BE VERY CONSTIPATING.  Take a stool softener or laxative such as senokot, pericolace, or milk of magnesia if needed.    Prescription given for Percocet (for pain).    If your physician has prescribed pain medication that includes Acetaminophen (Tylenol), do not take additional Acetaminophen (Tylenol) while taking the prescribed medication.    Depression / Suicide Risk    As you are discharged from this [facility] facility, it is important to learn how to keep safe from harming yourself.    Recognize the warning signs:  · Abrupt changes in personality, positive or negative- including increase in energy   · Giving away possessions  · Change in eating patterns- significant weight changes-  positive or negative  · Change in sleeping patterns- unable to sleep or sleeping all the time   · Unwillingness or inability to communicate  · Depression  · Unusual sadness, discouragement and loneliness  · Talk of wanting to die  · Neglect of personal appearance   · Rebelliousness- reckless behavior  · Withdrawal from people/activities they love  · Confusion- inability to concentrate     If you or a loved one observes any of these behaviors or has concerns about self-harm, here's what you can do:  · Talk about it- your feelings and reasons for harming yourself  · Remove any means that you might use to hurt yourself (examples: pills, rope, extension cords, firearm)  · Get professional help from the community (Mental Health, Substance Abuse, psychological counseling)  · Do not be alone:Call  your Safe Contact- someone whom you trust who will be there for you.  · Call your local CRISIS HOTLINE 036-2527 or 568-734-5388  · Call your local Children's Mobile Crisis Response Team [place] (616) 074-0809 or www.[employer]  · Call the toll free National Suicide Prevention Hotlines   · National Suicide Prevention Lifeline 702-876-JTRB (7829)  · National [employer] Data Line Network 800-SUICIDE (913-1432)

## 2019-09-27 NOTE — OR NURSING
Pt A&OX4. Remains drowsy but wakes easily to name. VSS. Pt on room air. Fluff gauze and mesh brief on post condyloma excision. Pt denies pain to surgery site. No complaints of nausea. Tolerated sips of water. Pt reports soreness/pain to throat from intubation. Script for Percocet on chart. Report called to DAMIEN Moore. Pt out of PACU via gurney to Phase II. Assisted to restroom.

## 2019-09-27 NOTE — ANESTHESIA PREPROCEDURE EVALUATION
Relevant Problems   NEURO   (+) History of nephrolithiasis      Other   (+) Tobacco use       Physical Exam    Airway   Mallampati: II  TM distance: >3 FB  Neck ROM: full       Cardiovascular - normal exam  Rhythm: regular  Rate: normal  (-) murmur     Dental - normal exam         Pulmonary - normal exam  Breath sounds clear to auscultation     Abdominal    Neurological - normal exam                 Anesthesia Plan    ASA 2       Plan - general       Airway plan will be ETT        Induction: intravenous    Postoperative Plan: Postoperative administration of opioids is intended.    Pertinent diagnostic labs and testing reviewed    Informed Consent:    Anesthetic plan and risks discussed with patient.    Use of blood products discussed with: patient whom consented to blood products.

## 2019-09-27 NOTE — ANESTHESIA PROCEDURE NOTES
Airway  Date/Time: 9/27/2019 8:31 AM  Performed by: Samuel Cui M.D.  Authorized by: Samuel Cui M.D.     Location:  OR  Urgency:  Elective  Indications for Airway Management:  Anesthesia  Spontaneous Ventilation: absent    Sedation Level:  Deep  Preoxygenated: Yes    Patient Position:  Sniffing  Final Airway Type:  Endotracheal airway  Final Endotracheal Airway:  ETT  Cuffed: Yes    Technique Used for Successful ETT Placement:  Direct laryngoscopy  Insertion Site:  Oral  Blade Type:  Pavithra  Laryngoscope Blade/Videolaryngoscope Blade Size:  3  ETT Size (mm):  7.0  Measured from:  Teeth  ETT to Teeth (cm):  22  Placement Verified by: auscultation and capnometry    Cormack-Lehane Classification:  Grade I - full view of glottis  Number of Attempts at Approach:  1

## 2019-09-28 NOTE — ANESTHESIA QCDR
2019 [facility] Clinical Data Registry (for Quality Improvement)     Postoperative nausea/vomiting risk protocol (Adult = 18 yrs and Pediatric 3-17 yrs)- (430 and 463)  General inhalation anesthetic (NOT TIVA) with PONV risk factors: Yes  Provision of anti-emetic therapy with at least 2 different classes of agents: Yes   Patient DID NOT receive anti-emetic therapy and reason is documented in Medical Record:  N/A    Multimodal Pain Management- (AQI59)  Patient undergoing Elective Surgery (i.e. Outpatient, or ASC, or Prescheduled Surgery prior to Hospital Admission): Yes  Use of Multimodal Pain Management, two or more drugs and/or interventions, NOT including systemic opioids: Yes   Exception: Documented allergy to multiple classes of analgesics:  N/A    PACU assessment of acute postoperative pain prior to Anesthesia Care End- Applies to Patients Age = 18- (ABG7)  Initial PACU pain score is which of the following: < 7/10  Patient unable to report pain score: N/A    Post-anesthetic transfer of care checklist/protocol to PACU/ICU- (426 and 427)  Upon conclusion of case, patient transferred to which of the following locations: PACU/Non-ICU  Use of transfer checklist/protocol: Yes  Exclusion: Service Performed in Patient Hospital Room (and thus did not require transfer): N/A    PACU Reintubation- (AQI31)  General anesthesia requiring endotracheal intubation (ETT) along with subsequent extubation in OR or PACU: Yes  Required reintubation in the PACU: No   Extubation was a planned trial documented in the medical record prior to removal of the original airway device:  N/A    Unplanned admission to ICU related to anesthesia service up through end of PACU care- (MD51)  Unplanned admission to ICU (not initially anticipated at anesthesia start time): No

## 2019-09-28 NOTE — ANESTHESIA POSTPROCEDURE EVALUATION
Patient: Rachel Jacobs    Procedure Summary     Date:  09/27/19 Room / Location:  [facility] 11 / SURGERY [facility]    Anesthesia Start:  0826 Anesthesia Stop:  0914    Procedure:  EXCISION, CONDYLOMA, PERIANAL AND FULGURATION Diagnosis:  (anal intraepitheal neoplasia type 1)    Surgeon:  Olegario Lim M.D. Responsible Provider:  Samuel Cui M.D.    Anesthesia Type:  general ASA Status:  2          Final Anesthesia Type: general  Last vitals  BP   Blood Pressure: (!) 96/40    Temp   35.9 °C (96.7 °F)    Pulse   Pulse: 63   Resp   15    SpO2   97 %      Anesthesia Post Evaluation    Patient location during evaluation: PACU  Patient participation: complete - patient participated  Level of consciousness: awake and alert    Airway patency: patent  Anesthetic complications: no  Cardiovascular status: hemodynamically stable  Respiratory status: acceptable  Hydration status: euvolemic    PONV: none           Nurse Pain Score: 0 (NPRS)

## 2019-10-29 NOTE — OP REPORT
DATE OF OPERATION: 9/27/2019  SURGEON:  Olegario Lim MD PhD    PREOPERATIVE DIAGNOSIS:  Rectal bleeding with rectal pain and perianal masses  POSTOPERATIVE DIAGNOSIS: Same  SURGERY: exam under anesthesia with excision and fulguration of anal masses  OPERATIVE FINDINGS:  Right lateral anal canal masses  INDICATIONS FOR PROCEDURE:  This 49 y.o. 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 She is here for removal of the rest of the mass. The possible risks and complications of treatment were described in detail to the patient including infection, bleeding, trouble urinating and very rarely narrowing in the anal canal.  The patient expresses understanding of the risks and benefits of the proposed procedure and wished to proceed.    PROCEDURE IN BRIEF:    The patient was brought to the operating room placed under general anesthesia with bilateral SCDs in place. The patient was then moved into the prone jackknife position.  a timeout was completed verifying the correct patient and procedure site positioning and availability of equipment prior to the start of surgery.  The buttocks were taped apart and the perineum was prepped and draped in the usual standard sterile fashion.  Half percent Marcaine with epinephrine was injected as a perianal nerve block. The anus was carefully dilated until 3 fingers could be introduced.  An anoscope was introduced and the 3 hemorrhoidal pedicles were identified.there were small punctate whitish lesions which were fulgurated. The right side of his anoderm was identified, fulgurated, and gently debrided with biopsies sent for pathologic analysis. His anus was then examined and there was  intra-anal component of the condyloma and additional biopsies were sent to pathology. Hemostasis was  obtained. An additional 20 mL of 0.25% Marcaine was instilled into the perianal tissue. The patient was then taken to the recovery room in good condition.The anal canal was then injected with local anesthetic.  A gauze pad was tucked between the gluteal folds. The patient tolerated the procedure well and was extubated and taken to the postanesthesia care unit in stable condition.       ____________________________________     KATHY CAMPBELL MD

## 2019-11-18 ENCOUNTER — OFFICE VISIT (OUTPATIENT)
Dept: URGENT CARE | Facility: CLINIC | Age: 49
End: 2019-11-18
Payer: COMMERCIAL

## 2019-11-18 ENCOUNTER — APPOINTMENT (OUTPATIENT)
Dept: RADIOLOGY | Facility: MEDICAL CENTER | Age: 49
End: 2019-11-18
Attending: EMERGENCY MEDICINE
Payer: OTHER MISCELLANEOUS

## 2019-11-18 ENCOUNTER — HOSPITAL ENCOUNTER (EMERGENCY)
Facility: MEDICAL CENTER | Age: 49
End: 2019-11-18
Attending: EMERGENCY MEDICINE
Payer: OTHER MISCELLANEOUS

## 2019-11-18 VITALS
BODY MASS INDEX: 20.99 KG/M2 | DIASTOLIC BLOOD PRESSURE: 70 MMHG | RESPIRATION RATE: 12 BRPM | HEART RATE: 72 BPM | WEIGHT: 134 LBS | SYSTOLIC BLOOD PRESSURE: 122 MMHG | OXYGEN SATURATION: 96 % | TEMPERATURE: 98.4 F

## 2019-11-18 VITALS
TEMPERATURE: 98.1 F | HEART RATE: 69 BPM | SYSTOLIC BLOOD PRESSURE: 99 MMHG | OXYGEN SATURATION: 93 % | BODY MASS INDEX: 20.28 KG/M2 | DIASTOLIC BLOOD PRESSURE: 41 MMHG | WEIGHT: 129.19 LBS | HEIGHT: 67 IN | RESPIRATION RATE: 16 BRPM

## 2019-11-18 DIAGNOSIS — R10.9 LEFT FLANK PAIN: ICD-10-CM

## 2019-11-18 DIAGNOSIS — R11.0 NAUSEA: ICD-10-CM

## 2019-11-18 DIAGNOSIS — R31.0 GROSS HEMATURIA: ICD-10-CM

## 2019-11-18 DIAGNOSIS — N20.1 URETEROLITHIASIS: ICD-10-CM

## 2019-11-18 DIAGNOSIS — R10.32 LLQ ABDOMINAL PAIN: ICD-10-CM

## 2019-11-18 LAB
ALBUMIN SERPL BCP-MCNC: 4.6 G/DL (ref 3.2–4.9)
ALBUMIN/GLOB SERPL: 1.4 G/DL
ALP SERPL-CCNC: 91 U/L (ref 30–99)
ALT SERPL-CCNC: 11 U/L (ref 2–50)
ANION GAP SERPL CALC-SCNC: 13 MMOL/L (ref 0–11.9)
APPEARANCE UR: ABNORMAL
APPEARANCE UR: NORMAL
AST SERPL-CCNC: 15 U/L (ref 12–45)
BACTERIA #/AREA URNS HPF: ABNORMAL /HPF
BASOPHILS # BLD AUTO: 0.5 % (ref 0–1.8)
BASOPHILS # BLD: 0.07 K/UL (ref 0–0.12)
BILIRUB SERPL-MCNC: 0.8 MG/DL (ref 0.1–1.5)
BILIRUB UR QL STRIP.AUTO: NEGATIVE
BILIRUB UR STRIP-MCNC: NORMAL MG/DL
BUN SERPL-MCNC: 15 MG/DL (ref 8–22)
CALCIUM SERPL-MCNC: 9.9 MG/DL (ref 8.4–10.2)
CHLORIDE SERPL-SCNC: 103 MMOL/L (ref 96–112)
CO2 SERPL-SCNC: 27 MMOL/L (ref 20–33)
COLOR UR AUTO: NORMAL
COLOR UR: YELLOW
CREAT SERPL-MCNC: 0.73 MG/DL (ref 0.5–1.4)
EOSINOPHIL # BLD AUTO: 0.15 K/UL (ref 0–0.51)
EOSINOPHIL NFR BLD: 1.1 % (ref 0–6.9)
EPI CELLS #/AREA URNS HPF: ABNORMAL /HPF
ERYTHROCYTE [DISTWIDTH] IN BLOOD BY AUTOMATED COUNT: 39.5 FL (ref 35.9–50)
GLOBULIN SER CALC-MCNC: 3.3 G/DL (ref 1.9–3.5)
GLUCOSE SERPL-MCNC: 117 MG/DL (ref 65–99)
GLUCOSE UR STRIP.AUTO-MCNC: NEGATIVE MG/DL
GLUCOSE UR STRIP.AUTO-MCNC: NORMAL MG/DL
HCG SERPL QL: NEGATIVE
HCT VFR BLD AUTO: 43 % (ref 37–47)
HGB BLD-MCNC: 14.6 G/DL (ref 12–16)
IMM GRANULOCYTES # BLD AUTO: 0.09 K/UL (ref 0–0.11)
IMM GRANULOCYTES NFR BLD AUTO: 0.7 % (ref 0–0.9)
KETONES UR STRIP.AUTO-MCNC: NEGATIVE MG/DL
KETONES UR STRIP.AUTO-MCNC: NORMAL MG/DL
LEUKOCYTE ESTERASE UR QL STRIP.AUTO: ABNORMAL
LEUKOCYTE ESTERASE UR QL STRIP.AUTO: NORMAL
LIPASE SERPL-CCNC: 37 U/L (ref 7–58)
LYMPHOCYTES # BLD AUTO: 2.47 K/UL (ref 1–4.8)
LYMPHOCYTES NFR BLD: 18.6 % (ref 22–41)
MCH RBC QN AUTO: 32.2 PG (ref 27–33)
MCHC RBC AUTO-ENTMCNC: 34 G/DL (ref 33.6–35)
MCV RBC AUTO: 94.9 FL (ref 81.4–97.8)
MICRO URNS: ABNORMAL
MONOCYTES # BLD AUTO: 0.82 K/UL (ref 0–0.85)
MONOCYTES NFR BLD AUTO: 6.2 % (ref 0–13.4)
MUCOUS THREADS #/AREA URNS HPF: ABNORMAL /HPF
NEUTROPHILS # BLD AUTO: 9.68 K/UL (ref 2–7.15)
NEUTROPHILS NFR BLD: 72.9 % (ref 44–72)
NITRITE UR QL STRIP.AUTO: NEGATIVE
NITRITE UR QL STRIP.AUTO: NORMAL
NRBC # BLD AUTO: 0 K/UL
NRBC BLD-RTO: 0 /100 WBC
PH UR STRIP.AUTO: 5.5 [PH] (ref 5–8)
PH UR STRIP.AUTO: 6 [PH] (ref 5–8)
PLATELET # BLD AUTO: 240 K/UL (ref 164–446)
PMV BLD AUTO: 9.9 FL (ref 9–12.9)
POTASSIUM SERPL-SCNC: 4.3 MMOL/L (ref 3.6–5.5)
PROT SERPL-MCNC: 7.9 G/DL (ref 6–8.2)
PROT UR QL STRIP: 30 MG/DL
PROT UR QL STRIP: 300 MG/DL
RBC # BLD AUTO: 4.53 M/UL (ref 4.2–5.4)
RBC # URNS HPF: >150 /HPF
RBC UR QL AUTO: ABNORMAL
RBC UR QL AUTO: NORMAL
SODIUM SERPL-SCNC: 143 MMOL/L (ref 135–145)
SP GR UR STRIP.AUTO: 1.02
SP GR UR STRIP.AUTO: <=1.005
UNIDENT CRYS URNS QL MICRO: ABNORMAL /HPF
UROBILINOGEN UR STRIP-MCNC: 0.2 MG/DL
WBC # BLD AUTO: 13.3 K/UL (ref 4.8–10.8)
WBC #/AREA URNS HPF: ABNORMAL /HPF

## 2019-11-18 PROCEDURE — A9270 NON-COVERED ITEM OR SERVICE: HCPCS | Performed by: EMERGENCY MEDICINE

## 2019-11-18 PROCEDURE — 80053 COMPREHEN METABOLIC PANEL: CPT

## 2019-11-18 PROCEDURE — 700102 HCHG RX REV CODE 250 W/ 637 OVERRIDE(OP): Performed by: EMERGENCY MEDICINE

## 2019-11-18 PROCEDURE — 36415 COLL VENOUS BLD VENIPUNCTURE: CPT

## 2019-11-18 PROCEDURE — 74176 CT ABD & PELVIS W/O CONTRAST: CPT

## 2019-11-18 PROCEDURE — 99214 OFFICE O/P EST MOD 30 MIN: CPT | Performed by: PHYSICIAN ASSISTANT

## 2019-11-18 PROCEDURE — 96374 THER/PROPH/DIAG INJ IV PUSH: CPT

## 2019-11-18 PROCEDURE — 700111 HCHG RX REV CODE 636 W/ 250 OVERRIDE (IP): Performed by: EMERGENCY MEDICINE

## 2019-11-18 PROCEDURE — 81001 URINALYSIS AUTO W/SCOPE: CPT

## 2019-11-18 PROCEDURE — 99285 EMERGENCY DEPT VISIT HI MDM: CPT

## 2019-11-18 PROCEDURE — 85025 COMPLETE CBC W/AUTO DIFF WBC: CPT

## 2019-11-18 PROCEDURE — 83690 ASSAY OF LIPASE: CPT

## 2019-11-18 PROCEDURE — 84703 CHORIONIC GONADOTROPIN ASSAY: CPT

## 2019-11-18 PROCEDURE — 81002 URINALYSIS NONAUTO W/O SCOPE: CPT | Performed by: PHYSICIAN ASSISTANT

## 2019-11-18 RX ORDER — TAMSULOSIN HYDROCHLORIDE 0.4 MG/1
0.4 CAPSULE ORAL DAILY
Qty: 30 CAP | Refills: 0 | Status: SHIPPED | OUTPATIENT
Start: 2019-11-18 | End: 2020-06-05

## 2019-11-18 RX ORDER — HYDROCODONE BITARTRATE AND ACETAMINOPHEN 5; 325 MG/1; MG/1
1-2 TABLET ORAL EVERY 4 HOURS PRN
Qty: 15 TAB | Refills: 0 | Status: SHIPPED | OUTPATIENT
Start: 2019-11-18 | End: 2019-11-21

## 2019-11-18 RX ORDER — KETOROLAC TROMETHAMINE 30 MG/ML
60 INJECTION, SOLUTION INTRAMUSCULAR; INTRAVENOUS ONCE
Status: COMPLETED | OUTPATIENT
Start: 2019-11-18 | End: 2019-11-18

## 2019-11-18 RX ORDER — CEFDINIR 300 MG/1
300 CAPSULE ORAL ONCE
Status: COMPLETED | OUTPATIENT
Start: 2019-11-18 | End: 2019-11-18

## 2019-11-18 RX ORDER — CEFDINIR 300 MG/1
300 CAPSULE ORAL 2 TIMES DAILY
Qty: 20 CAP | Refills: 0 | Status: SHIPPED | OUTPATIENT
Start: 2019-11-18 | End: 2019-11-23

## 2019-11-18 RX ORDER — ONDANSETRON 2 MG/ML
4 INJECTION INTRAMUSCULAR; INTRAVENOUS ONCE
Status: COMPLETED | OUTPATIENT
Start: 2019-11-18 | End: 2019-11-18

## 2019-11-18 RX ORDER — HYDROCODONE BITARTRATE AND ACETAMINOPHEN 5; 325 MG/1; MG/1
1 TABLET ORAL ONCE
Status: COMPLETED | OUTPATIENT
Start: 2019-11-18 | End: 2019-11-18

## 2019-11-18 RX ORDER — TAMSULOSIN HYDROCHLORIDE 0.4 MG/1
0.4 CAPSULE ORAL ONCE
Status: COMPLETED | OUTPATIENT
Start: 2019-11-18 | End: 2019-11-18

## 2019-11-18 RX ORDER — ONDANSETRON 4 MG/1
4 TABLET, ORALLY DISINTEGRATING ORAL EVERY 6 HOURS PRN
Qty: 10 TAB | Refills: 0 | Status: SHIPPED | OUTPATIENT
Start: 2019-11-18 | End: 2020-06-05

## 2019-11-18 RX ORDER — KETOROLAC TROMETHAMINE 30 MG/ML
15 INJECTION, SOLUTION INTRAMUSCULAR; INTRAVENOUS ONCE
Status: COMPLETED | OUTPATIENT
Start: 2019-11-18 | End: 2019-11-18

## 2019-11-18 RX ADMIN — TAMSULOSIN HYDROCHLORIDE 0.4 MG: 0.4 CAPSULE ORAL at 20:32

## 2019-11-18 RX ADMIN — KETOROLAC TROMETHAMINE 60 MG: 30 INJECTION, SOLUTION INTRAMUSCULAR; INTRAVENOUS at 17:14

## 2019-11-18 RX ADMIN — KETOROLAC TROMETHAMINE 15 MG: 30 INJECTION, SOLUTION INTRAMUSCULAR at 20:30

## 2019-11-18 RX ADMIN — ONDANSETRON 4 MG: 2 INJECTION INTRAMUSCULAR; INTRAVENOUS at 17:15

## 2019-11-18 RX ADMIN — HYDROCODONE BITARTRATE AND ACETAMINOPHEN 1 TABLET: 5; 325 TABLET ORAL at 21:09

## 2019-11-18 RX ADMIN — CEFDINIR 300 MG: 300 CAPSULE ORAL at 20:32

## 2019-11-18 ASSESSMENT — ENCOUNTER SYMPTOMS
BRUISES/BLEEDS EASILY: 0
DIZZINESS: 0
ABDOMINAL PAIN: 1
COUGH: 0
NAUSEA: 1
DIARRHEA: 1
SHORTNESS OF BREATH: 0
FLANK PAIN: 1
PALPITATIONS: 0
BLURRED VISION: 0
FEVER: 0
CHILLS: 0
VOMITING: 1
BLOOD IN STOOL: 0

## 2019-11-19 NOTE — ED NOTES
Discharge instructions reviewed with patient. Questions/concerns addressed. IV removed. RX given to patient.

## 2019-11-19 NOTE — ED NOTES
UA collected and sent to the lab at this time. Pt changed into a gown and provided with warm blankets for comfort

## 2019-11-19 NOTE — PROGRESS NOTES
Subjective:      Rachel Jacobs is a 49 y.o. female who presents with Abdominal Pain (X toady / abd lower left side pain peeing blood/ diarrhea)      HPI:  This is a new problem. Rachel Jacobs is a 49 y.o. female who presents today for evaluation of left lower quadrant abdominal pain, left-sided back pain, blood in her urine.  Patient states that all the symptoms started today.  She said recently she is getting over the flu and today when she was at work she started to have lower abdominal pain and diarrhea.  She then noticed that a couple times when she urinated that there was a lot of blood on the tissue after she wiped.  Patient reports that she is also had some nausea and vomiting earlier associated with the pain.  She says the pain is a 10/10.  She does have a history of kidney stones.  No fever/chills, chest pain, shortness of breath, lightheadedness/dizziness.      Review of Systems   Constitutional: Negative for chills, fever and malaise/fatigue.   Eyes: Negative for blurred vision.   Respiratory: Negative for cough and shortness of breath.    Cardiovascular: Negative for chest pain and palpitations.   Gastrointestinal: Positive for abdominal pain, diarrhea, nausea and vomiting. Negative for blood in stool and melena.   Genitourinary: Positive for flank pain and hematuria. Negative for dysuria, frequency and urgency.   Skin: Negative for rash.   Neurological: Negative for dizziness.   Endo/Heme/Allergies: Does not bruise/bleed easily.       PMH:  has a past medical history of Depression, Endometriosis, Macrocytosis (3/2014), and Nephrolithiasis.  MEDS:   Current Outpatient Medications:   •  Omega-3 Fatty Acids (FISH OIL) 1000 MG Cap capsule, Take 1,000 mg by mouth every day., Disp: , Rfl:   •  PARoxetine (PAXIL) 10 MG Tab, Take 1 Tab by mouth every day., Disp: 90 Tab, Rfl: 1    Current Facility-Administered Medications:   •  ketorolac (TORADOL) injection 60 mg, 60 mg, Intramuscular, Once, Estrella  "RAYNA Ruano P.A.-C.  •  ondansetron (ZOFRAN) syringe/vial injection 4 mg, 4 mg, Intramuscular, Once, Estrella Ruano P.A.-C.  ALLERGIES:   Allergies   Allergen Reactions   • Pcn [Penicillins] Shortness of Breath     \"SOB, increase Heart beat\"  Patient stated she can't take  Amoxicillin either     SURGHX:   Past Surgical History:   Procedure Laterality Date   • DESTRUCTION OF ANAL WARTS  9/27/2019    Procedure: EXCISION, CONDYLOMA, PERIANAL AND FULGURATION;  Surgeon: Olegario Lim M.D.;  Location: SURGERY [facility];  Service: General   • CYSTOSCOPY Left 5/25/2018    Procedure: CYSTOSCOPY with STENT placement;  Surgeon: Blaise Dominguez M.D.;  Location: SURGERY [facility];  Service: Urology   • URETEROSCOPY Left 5/25/2018    Procedure: URETEROSCOPY;  Surgeon: Blaise Dominguez M.D.;  Location: SURGERY [facility];  Service: Urology   • ABDOMINAL HYSTERECTOMY TOTAL      2008  still has ovaries (pt believes)   • MENISCUS REPAIR       SOCHX:  reports that she has been smoking cigarettes. She has been smoking about 0.10 packs per day. She has never used smokeless tobacco. She reports current alcohol use of about 0.6 oz of alcohol per week. She reports current drug use. Drug: Inhaled.  FH: Family history was reviewed, no pertinent findings to report     Objective:     /70 (BP Location: Left arm, Patient Position: Sitting, BP Cuff Size: Adult)   Pulse 72   Temp 36.9 °C (98.4 °F) (Temporal)   Resp 12   Wt 60.8 kg (134 lb)   LMP 09/27/2008   SpO2 96%   BMI 20.99 kg/m²      Physical Exam  Constitutional:       Appearance: Normal appearance. She is well-developed.   HENT:      Head: Normocephalic and atraumatic.      Right Ear: External ear normal.      Left Ear: External ear normal.   Eyes:      Conjunctiva/sclera: Conjunctivae normal.      Pupils: Pupils are equal, round, and reactive to light.   Cardiovascular:      Rate and Rhythm: Normal rate and regular rhythm.      Heart sounds: No " murmur.   Pulmonary:      Effort: Pulmonary effort is normal.      Breath sounds: Normal breath sounds.   Abdominal:      General: Bowel sounds are normal.      Palpations: Abdomen is soft.      Tenderness: There is tenderness in the left lower quadrant. There is left CVA tenderness. There is no guarding or rebound.   Skin:     General: Skin is warm and dry.      Capillary Refill: Capillary refill takes less than 2 seconds.   Neurological:      Mental Status: She is alert and oriented to person, place, and time.   Psychiatric:         Behavior: Behavior normal.         Judgment: Judgment normal.         POCT Urinalysis  Lab Results   Component Value Date/Time    POCCOLOR brown 11/18/2019 04:44 PM    POCAPPEAR cloudy 11/18/2019 04:44 PM    POCLEUKEST neg 11/18/2019 04:44 PM    POCNITRITE neg 11/18/2019 04:44 PM    POCUROBILIGE 0.2 11/18/2019 04:44 PM    POCPROTEIN 300 11/18/2019 04:44 PM    POCURPH 5.5 11/18/2019 04:44 PM    POCBLOOD lg 11/18/2019 04:44 PM    POCSPGRV 1.025 11/18/2019 04:44 PM    POCKETONES trace 11/18/2019 04:44 PM    POCBILIRUBIN mod 11/18/2019 04:44 PM    POCGLUCUA neg 11/18/2019 04:44 PM           Assessment/Plan:       1. Gross hematuria  - POCT Urinalysis    2. Left flank pain  - POCT Urinalysis  - ketorolac (TORADOL) injection 60 mg    3. LLQ abdominal pain  - POCT Urinalysis  - ketorolac (TORADOL) injection 60 mg    4. Nausea  - ondansetron (ZOFRAN) syringe/vial injection 4 mg          *Patient is endorsing 10/10 pain and she looks uncomfortable on exam.  Physical exam and urinalysis are consistent with kidney stones but we are unable to obtain an outpatient CT scan at this time.  Recommend that she go to the emergency department for further work-up and evaluation.  Patient agrees.  Her friend is going to drive her.

## 2019-11-19 NOTE — ED PROVIDER NOTES
ED Provider Note    CHIEF COMPLAINT  Chief Complaint   Patient presents with   • Blood in Urine     since 1230 today   • LLQ Pain       HPI  Rachel Jacobs is a 49 y.o. female who presents to the emergency department hematuria and blood in urine started approximately 1230 earlier today.  In addition complains of left lower quadrant pain.  The pain is dull in nature with no loosening relieving factors, no radiation to her groin or to her chest.  She does have a history of multiple kidney stones in the past and states this feels slightly different.  Denies dysuria, hematochezia, melena, hematemesis and vomiting.  Patient was seen at urgent care earlier today and was told that she does not have a urinary tract infection, she is not pregnant and instructed to come to the emergency department for evaluation.  REVIEW OF SYSTEMS  Positives as above. Pertinent negatives include chest pain, fever, shortness of breath, vomiting  All other review of systems are negative    PAST MEDICAL HISTORY  Past Medical History:   Diagnosis Date   • Depression    • Endometriosis    • Macrocytosis 3/2014   • Nephrolithiasis        FAMILY HISTORY  Noncontributory    SOCIAL HISTORY  Social History     Socioeconomic History   • Marital status:      Spouse name: Not on file   • Number of children: Not on file   • Years of education: Not on file   • Highest education level: Not on file   Occupational History   • Not on file   Social Needs   • Financial resource strain: Not on file   • Food insecurity:     Worry: Not on file     Inability: Not on file   • Transportation needs:     Medical: Not on file     Non-medical: Not on file   Tobacco Use   • Smoking status: Current Every Day Smoker     Packs/day: 0.10     Types: Cigarettes   • Smokeless tobacco: Never Used   Substance and Sexual Activity   • Alcohol use: Yes     Alcohol/week: 0.6 oz     Types: 1 Standard drinks or equivalent per week     Comment: Occasionally   • Drug use: Yes  "    Types: Inhaled   • Sexual activity: Yes     Partners: Male     Birth control/protection: Surgical   Lifestyle   • Physical activity:     Days per week: Not on file     Minutes per session: Not on file   • Stress: Not on file   Relationships   • Social connections:     Talks on phone: Not on file     Gets together: Not on file     Attends Jain service: Not on file     Active member of club or organization: Not on file     Attends meetings of clubs or organizations: Not on file     Relationship status: Not on file   • Intimate partner violence:     Fear of current or ex partner: Not on file     Emotionally abused: Not on file     Physically abused: Not on file     Forced sexual activity: Not on file   Other Topics Concern   • Not on file   Social History Narrative    Lives with boyfriend.     Children: 1    Work: office       SURGICAL HISTORY  Past Surgical History:   Procedure Laterality Date   • DESTRUCTION OF ANAL WARTS  9/27/2019    Procedure: EXCISION, CONDYLOMA, PERIANAL AND FULGURATION;  Surgeon: Olegario Lim M.D.;  Location: [facility];  Service: General   • CYSTOSCOPY Left 5/25/2018    Procedure: CYSTOSCOPY with STENT placement;  Surgeon: Blaise Dominguez M.D.;  Location: SURGERY [facility];  Service: Urology   • URETEROSCOPY Left 5/25/2018    Procedure: URETEROSCOPY;  Surgeon: Blaise Dominguez M.D.;  Location: SURGERY [facility];  Service: Urology   • ABDOMINAL HYSTERECTOMY TOTAL      2008  still has ovaries (pt believes)   • MENISCUS REPAIR         CURRENT MEDICATIONS  Home Medications    **Home medications have not yet been reviewed for this encounter**         ALLERGIES  Allergies   Allergen Reactions   • Pcn [Penicillins] Shortness of Breath     \"SOB, increase Heart beat\"  Patient stated she can't take  Amoxicillin either       PHYSICAL EXAM  VITAL SIGNS: BP (!) 99/41   Pulse 69   Temp 36.7 °C (98.1 °F) (Temporal)   Resp 16   Ht 1.702 m (5' 7\")   Wt 58.6 " kg (129 lb 3 oz)   LMP 09/27/2008   SpO2 93%   BMI 20.23 kg/m²      Constitutional: Well developed, Well nourished, No acute distress, Non-toxic appearance.   Eyes: PERRLA, EOMI, Conjunctiva normal, No discharge.   Cardiovascular: Normal heart rate, Normal rhythm, No murmurs, No rubs, No gallops, and intact distal pulses.   Thorax & Lungs:  No respiratory distress, no rales, no rhonchi, No wheezing, No chest wall tenderness.   Abdomen: Bowel sounds normal, Soft, left lower quadrant tenderness, No guarding, No rebound, No pulsatile masses.   Skin: Warm, Dry, No erythema, No rash.   Extremities: Full range of motion, no deformity, no edema.  Neurologic: Alert & oriented x 3, No focal deficits noted, acting appropriately on exam.  Psychiatric: Affect normal for clinical presentation.      RADIOLOGY/PROCEDURES  CT-RENAL COLIC EVALUATION(A/P W/O)   Final Result         1.  Probable 3 mm distal left ureteral stone producing mild left hydronephrosis.   2.  Duplicated left renal collecting system.   3.  No right renal stone or hydronephrosis.                 Results for orders placed or performed during the hospital encounter of 11/18/19   CBC WITH DIFFERENTIAL   Result Value Ref Range    WBC 13.3 (H) 4.8 - 10.8 K/uL    RBC 4.53 4.20 - 5.40 M/uL    Hemoglobin 14.6 12.0 - 16.0 g/dL    Hematocrit 43.0 37.0 - 47.0 %    MCV 94.9 81.4 - 97.8 fL    MCH 32.2 27.0 - 33.0 pg    MCHC 34.0 33.6 - 35.0 g/dL    RDW 39.5 35.9 - 50.0 fL    Platelet Count 240 164 - 446 K/uL    MPV 9.9 9.0 - 12.9 fL    Neutrophils-Polys 72.90 (H) 44.00 - 72.00 %    Lymphocytes 18.60 (L) 22.00 - 41.00 %    Monocytes 6.20 0.00 - 13.40 %    Eosinophils 1.10 0.00 - 6.90 %    Basophils 0.50 0.00 - 1.80 %    Immature Granulocytes 0.70 0.00 - 0.90 %    Nucleated RBC 0.00 /100 WBC    Neutrophils (Absolute) 9.68 (H) 2.00 - 7.15 K/uL    Lymphs (Absolute) 2.47 1.00 - 4.80 K/uL    Monos (Absolute) 0.82 0.00 - 0.85 K/uL    Eos (Absolute) 0.15 0.00 - 0.51 K/uL    Baso  (Absolute) 0.07 0.00 - 0.12 K/uL    Immature Granulocytes (abs) 0.09 0.00 - 0.11 K/uL    NRBC (Absolute) 0.00 K/uL   COMP METABOLIC PANEL   Result Value Ref Range    Sodium 143 135 - 145 mmol/L    Potassium 4.3 3.6 - 5.5 mmol/L    Chloride 103 96 - 112 mmol/L    Co2 27 20 - 33 mmol/L    Anion Gap 13.0 (H) 0.0 - 11.9    Glucose 117 (H) 65 - 99 mg/dL    Bun 15 8 - 22 mg/dL    Creatinine 0.73 0.50 - 1.40 mg/dL    Calcium 9.9 8.4 - 10.2 mg/dL    AST(SGOT) 15 12 - 45 U/L    ALT(SGPT) 11 2 - 50 U/L    Alkaline Phosphatase 91 30 - 99 U/L    Total Bilirubin 0.8 0.1 - 1.5 mg/dL    Albumin 4.6 3.2 - 4.9 g/dL    Total Protein 7.9 6.0 - 8.2 g/dL    Globulin 3.3 1.9 - 3.5 g/dL    A-G Ratio 1.4 g/dL   LIPASE   Result Value Ref Range    Lipase 37 7 - 58 U/L   HCG QUAL SERUM   Result Value Ref Range    Beta-Hcg Qualitative Serum Negative Negative   URINALYSIS,CULTURE IF INDICATED   Result Value Ref Range    Color Yellow     Character Sl Cloudy (A)     Specific Gravity <=1.005 <1.035    Ph 6.0 5.0 - 8.0    Glucose Negative Negative mg/dL    Ketones Negative Negative mg/dL    Protein 30 (A) Negative mg/dL    Bilirubin Negative Negative    Nitrite Negative Negative    Leukocyte Esterase Trace (A) Negative    Occult Blood Large (A) Negative    Micro Urine Req Microscopic    URINE MICROSCOPIC (W/UA)   Result Value Ref Range    WBC 5-10 (A) /hpf    RBC >150 (A) /hpf    Bacteria Few (A) None /hpf    Epithelial Cells Few Few /hpf    Mucous Threads Moderate /hpf    Urine Crystals Rare Amorphous /hpf   ESTIMATED GFR   Result Value Ref Range    GFR If African American >60 >60 mL/min/1.73 m 2    GFR If Non African American >60 >60 mL/min/1.73 m 2           COURSE & MEDICAL DECISION MAKING  Pertinent Labs & Imaging studies reviewed. (See chart for details)  This is a charming 49-year-old female presents with hematuria.  The patient had a CT scan revealed evidence of a 3 mm stone in the left distal ureter with slight/mild hydronephrosis.  The  patient has no evidence of significant leukocytosis although it is 13,300 I do believe secondary to her pain.  She does not have evidence of a fever.  Urinalysis is vaguely positive for infection.  I discussed the patient Dr. Morley who agrees for the patient received oral antibiotics, strict return precautions and follow-up as an outpatient.  For management, the patient received Toradol 15 mg IM, Zofran 4 mg orally, Flomax 0.4 mg orally.  On reevaluation, the patient had a soft, nonsurgical, nontender abdomen.  She is positive p.o. received 1 dose of Norco for the evening and will be having a prescription filled tomorrow for pain medication nausea medication.  The patient understands the need to return to the emergency department she has increasing symptomatology such as fever, uncontrolled pain, nausea vomiting and she will be following up with Dr. Morley as an outpatient.  She does have a urine strainer at home and will be straining her urine for possible stone capture.    Discharge Medication List as of 11/18/2019  9:06 PM      START taking these medications    Details   ondansetron (ZOFRAN ODT) 4 MG TABLET DISPERSIBLE Take 1 Tab by mouth every 6 hours as needed for Nausea., Disp-10 Tab, R-0, Print Rx Paper      HYDROcodone-acetaminophen (NORCO) 5-325 MG Tab per tablet Take 1-2 Tabs by mouth every four hours as needed for up to 3 days., Disp-15 Tab, R-0, Print Rx Paper, For 3 days      tamsulosin (FLOMAX) 0.4 MG capsule Take 1 Cap by mouth every day., Disp-30 Cap, R-0, Normal      cefdinir (OMNICEF) 300 MG Cap Take 1 Cap by mouth 2 times a day for 5 days., Disp-20 Cap, R-0, Print Rx Paper             FINAL IMPRESSION     1. Ureterolithiasis Active     In prescribing controlled substances to this patient, I certify that I have obtained and reviewed the medical history of Rachel Jacobs. I have also made a good jaylyn effort to obtain applicable records from other providers who have treated the patient and  records did not demonstrate any increased risk of substance abuse that would prevent me from prescribing controlled substances.     I have conducted a physical exam and documented it. I have reviewed Ms. Jacobs’s prescription history as maintained by the [place] Prescription Monitoring Program.     I have assessed the patient’s risk for abuse, dependency, and addiction using the validated Opioid Risk Tool available at https://www.[employer].com/vxbqub-enip-keiq-ort-narcotic-abuse.     Given the above, I believe the benefits of controlled substance therapy outweigh the risks. The reasons for prescribing controlled substances include non-narcotic, oral analgesic alternatives have been inadequate for pain control. Accordingly, I have discussed the risk and benefits, treatment plan, and alternative therapies with the patient.         DISPOSITION:  Patient will be discharged home in stable condition.    FOLLOW UP:  [facility], Emergency Dept  [address] [facility]  [place] [address]  [phone]    If symptoms worsen    Partha Quintanilla M.D.  [address] [facility] [address]  [phone]    Schedule an appointment as soon as possible for a visit in 1 week      Electronically signed by: Umair Murcia, 11/18/2019 7:04 PM

## 2019-11-19 NOTE — ED TRIAGE NOTES
Pt ambulates to triage  Chief Complaint   Patient presents with   • Blood in Urine     since 1230 today   • LLQ Pain   hx of kidney stones x 7  Pt A & 0 x 4, speech clear, ambulates well  Pt asked to wait in lobby, pt updated on triage process and pt asked to inform RN of any changes.

## 2019-12-06 DIAGNOSIS — F32.5 MAJOR DEPRESSION IN REMISSION (HCC): ICD-10-CM

## 2019-12-06 DIAGNOSIS — F51.01 PRIMARY INSOMNIA: ICD-10-CM

## 2019-12-06 DIAGNOSIS — F41.1 GAD (GENERALIZED ANXIETY DISORDER): ICD-10-CM

## 2019-12-06 RX ORDER — PAROXETINE 10 MG/1
TABLET, FILM COATED ORAL
Qty: 15 TAB | Refills: 0 | Status: SHIPPED | OUTPATIENT
Start: 2019-12-06 | End: 2020-05-05 | Stop reason: SDUPTHER

## 2020-05-05 ENCOUNTER — TELEMEDICINE (OUTPATIENT)
Dept: MEDICAL GROUP | Age: 50
End: 2020-05-05
Payer: OTHER MISCELLANEOUS

## 2020-05-05 VITALS — BODY MASS INDEX: 20.4 KG/M2 | HEIGHT: 67 IN | WEIGHT: 130 LBS | TEMPERATURE: 96.8 F

## 2020-05-05 DIAGNOSIS — Z00.00 HEALTH CARE MAINTENANCE: ICD-10-CM

## 2020-05-05 DIAGNOSIS — F51.01 PRIMARY INSOMNIA: ICD-10-CM

## 2020-05-05 DIAGNOSIS — F43.9 STRESS: ICD-10-CM

## 2020-05-05 DIAGNOSIS — F32.2 MAJOR DEPRESSIVE DISORDER, SEVERE (HCC): ICD-10-CM

## 2020-05-05 DIAGNOSIS — F41.1 GAD (GENERALIZED ANXIETY DISORDER): ICD-10-CM

## 2020-05-05 DIAGNOSIS — Z72.0 TOBACCO USE: ICD-10-CM

## 2020-05-05 PROCEDURE — 99214 OFFICE O/P EST MOD 30 MIN: CPT | Mod: 95,CR | Performed by: INTERNAL MEDICINE

## 2020-05-05 RX ORDER — ALPRAZOLAM 0.5 MG/1
0.5 TABLET ORAL NIGHTLY PRN
Qty: 30 TAB | Refills: 0 | Status: SHIPPED | OUTPATIENT
Start: 2020-05-05 | End: 2021-02-10 | Stop reason: SDUPTHER

## 2020-05-05 RX ORDER — ALPRAZOLAM 0.5 MG/1
0.5 TABLET ORAL NIGHTLY PRN
Qty: 30 TAB | Refills: 0 | Status: SHIPPED | OUTPATIENT
Start: 2020-05-05 | End: 2020-05-05 | Stop reason: SDUPTHER

## 2020-05-05 RX ORDER — PAROXETINE HYDROCHLORIDE 20 MG/1
20 TABLET, FILM COATED ORAL DAILY
Qty: 30 TAB | Refills: 0 | Status: SHIPPED | OUTPATIENT
Start: 2020-05-05 | End: 2020-06-05

## 2020-05-05 ASSESSMENT — PATIENT HEALTH QUESTIONNAIRE - PHQ9
5. POOR APPETITE OR OVEREATING: 3 - NEARLY EVERY DAY
CLINICAL INTERPRETATION OF PHQ2 SCORE: 5
SUM OF ALL RESPONSES TO PHQ QUESTIONS 1-9: 21

## 2020-05-05 ASSESSMENT — ANXIETY QUESTIONNAIRES
GAD7 TOTAL SCORE: 15
6. BECOMING EASILY ANNOYED OR IRRITABLE: NEARLY EVERY DAY
3. WORRYING TOO MUCH ABOUT DIFFERENT THINGS: NEARLY EVERY DAY
7. FEELING AFRAID AS IF SOMETHING AWFUL MIGHT HAPPEN: NEARLY EVERY DAY
2. NOT BEING ABLE TO STOP OR CONTROL WORRYING: NEARLY EVERY DAY
1. FEELING NERVOUS, ANXIOUS, OR ON EDGE: NOT AT ALL
5. BEING SO RESTLESS THAT IT IS HARD TO SIT STILL: NOT AT ALL
4. TROUBLE RELAXING: NEARLY EVERY DAY

## 2020-05-05 ASSESSMENT — FIBROSIS 4 INDEX: FIB4 SCORE: .979911869877731774
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
## 2020-06-05 ENCOUNTER — TELEPHONE (OUTPATIENT)
Dept: HEALTH INFORMATION MANAGEMENT | Facility: OTHER | Age: 50
End: 2020-06-05

## 2020-06-05 ENCOUNTER — SUPERVISING PHYSICIAN REVIEW (OUTPATIENT)
Dept: URGENT CARE | Facility: CLINIC | Age: 50
End: 2020-06-05

## 2020-06-05 ENCOUNTER — APPOINTMENT (OUTPATIENT)
Dept: RADIOLOGY | Facility: IMAGING CENTER | Age: 50
End: 2020-06-05
Attending: PHYSICIAN ASSISTANT
Payer: OTHER MISCELLANEOUS

## 2020-06-05 ENCOUNTER — OFFICE VISIT (OUTPATIENT)
Dept: URGENT CARE | Facility: CLINIC | Age: 50
End: 2020-06-05
Payer: OTHER MISCELLANEOUS

## 2020-06-05 VITALS
HEART RATE: 75 BPM | SYSTOLIC BLOOD PRESSURE: 108 MMHG | OXYGEN SATURATION: 97 % | WEIGHT: 133 LBS | BODY MASS INDEX: 20.88 KG/M2 | HEIGHT: 67 IN | RESPIRATION RATE: 14 BRPM | DIASTOLIC BLOOD PRESSURE: 78 MMHG | TEMPERATURE: 97.6 F

## 2020-06-05 DIAGNOSIS — R05.9 COUGH: ICD-10-CM

## 2020-06-05 DIAGNOSIS — Z20.822 SUSPECTED COVID-19 VIRUS INFECTION: ICD-10-CM

## 2020-06-05 DIAGNOSIS — R19.7 DIARRHEA, UNSPECIFIED TYPE: ICD-10-CM

## 2020-06-05 PROCEDURE — 71046 X-RAY EXAM CHEST 2 VIEWS: CPT | Mod: TC | Performed by: PHYSICIAN ASSISTANT

## 2020-06-05 PROCEDURE — 99214 OFFICE O/P EST MOD 30 MIN: CPT | Performed by: PHYSICIAN ASSISTANT

## 2020-06-05 ASSESSMENT — ENCOUNTER SYMPTOMS
COUGH: 1
DIARRHEA: 1
VOMITING: 0
MYALGIAS: 1
PALPITATIONS: 0
EYE PAIN: 0
SORE THROAT: 0
WEAKNESS: 0
CONSTIPATION: 0
ABDOMINAL PAIN: 0
ORTHOPNEA: 0
SHORTNESS OF BREATH: 1
WHEEZING: 0
CHILLS: 1
DIZZINESS: 0
FEVER: 0
BLOOD IN STOOL: 0
SPUTUM PRODUCTION: 0
HEADACHES: 1
NAUSEA: 1

## 2020-06-05 ASSESSMENT — FIBROSIS 4 INDEX: FIB4 SCORE: .979911869877731774

## 2020-06-05 NOTE — PATIENT INSTRUCTIONS
Call [facility] for rapid testing or follow up with the health department for covid testing.     Return to clinic or ER if shortness of breath progresses.

## 2020-06-05 NOTE — PROGRESS NOTES
Subjective:   Rachel Jacobs is a 50 y.o. female who presents for Cough (and shortness of breath, diarrha, nausea, x 3 days , possible contact with Covid19 5/30, )      This is a generally healthy 50-year-old female who presents complaining of 3 days of worsening malaise with diarrhea, a dry cough, and mild shortness of breath that seems to be progressing.  She has been self isolating during this COVID-19 pandemic however she went to gathering of people 6 days ago and her symptoms started about 48 hours afterwards.  She has not noticed any fever and has not taking any antipyretics.  She has no recent travel, no recent antibiotics.  She describes her shortness of breath is slight.  She denies palpitations or chest pain.  No bloody stools.  No lower extremity pain or swelling      Review of Systems   Constitutional: Positive for chills and malaise/fatigue. Negative for fever.   HENT: Negative for congestion, ear pain and sore throat (due to coughing).    Eyes: Negative for pain.   Respiratory: Positive for cough and shortness of breath. Negative for sputum production and wheezing.    Cardiovascular: Negative for chest pain, palpitations, orthopnea and leg swelling.   Gastrointestinal: Positive for diarrhea and nausea. Negative for abdominal pain, blood in stool, constipation and vomiting.   Genitourinary: Negative for dysuria.   Musculoskeletal: Positive for myalgias.   Skin: Negative for rash.   Neurological: Positive for headaches. Negative for dizziness and weakness.       Medications:    • fish oil Caps  • ondansetron Tbdp  • PARoxetine Tabs  • tamsulosin    Allergies: Pcn [penicillins]    Problem List: Rachel Jacobs has Depression; Insomnia; History of nephrolithiasis; WILFRID (generalized anxiety disorder); Macrocytosis without anemia; Tobacco use; Colon cancer screening; Screening for malignant neoplasm of breast; Polyp of colon; AIN grade I; Major depressive disorder, severe (HCC); and Stress on their  "problem list.    Surgical History:  Past Surgical History:   Procedure Laterality Date   • DESTRUCTION OF ANAL WARTS  9/27/2019    Procedure: EXCISION, CONDYLOMA, PERIANAL AND FULGURATION;  Surgeon: Olegario Lim M.D.;  Location: SURGERY [facility];  Service: General   • CYSTOSCOPY Left 5/25/2018    Procedure: CYSTOSCOPY with STENT placement;  Surgeon: Blaise Dominguez M.D.;  Location: SURGERY [facility];  Service: Urology   • URETEROSCOPY Left 5/25/2018    Procedure: URETEROSCOPY;  Surgeon: Blaise Dominguez M.D.;  Location: SURGERY [facility];  Service: Urology   • ABDOMINAL HYSTERECTOMY TOTAL      2008  still has ovaries (pt believes)   • MENISCUS REPAIR         Past Social Hx: Rachel Jacobs  reports that she has been smoking cigarettes. She has been smoking about 0.10 packs per day. She has never used smokeless tobacco. She reports current alcohol use of about 0.6 oz of alcohol per week. She reports current drug use. Drug: Inhaled.     Past Family Hx:  Rachel Jacobs family history includes Allergies in her mother; Diabetes in her mother; Genitourinary () Problems in her father; Hyperlipidemia in her mother; Hypertension in her mother; Other in her father.     Problem list, medications, and allergies reviewed by myself today in Epic.     Objective:     /78   Pulse 75   Temp 36.4 °C (97.6 °F) (Temporal)   Resp 14   Ht 1.702 m (5' 7\")   Wt 60.3 kg (133 lb)   LMP 09/27/2008   SpO2 97%   BMI 20.83 kg/m²     Physical Exam  Vitals signs reviewed.   Constitutional:       General: She is not in acute distress.     Appearance: Normal appearance. She is ill-appearing. She is not toxic-appearing.      Comments: Pt laying supine on table when seen by provider, low energy, ill appearing.  Very tearful and anxious when describing symptoms.    HENT:      Head: Normocephalic and atraumatic.      Right Ear: Tympanic membrane, ear canal and external ear normal.      Left Ear: " Tympanic membrane, ear canal and external ear normal.      Nose: Nose normal. No congestion or rhinorrhea.      Mouth/Throat:      Mouth: Mucous membranes are moist.      Pharynx: No oropharyngeal exudate or posterior oropharyngeal erythema.   Eyes:      Conjunctiva/sclera: Conjunctivae normal.      Pupils: Pupils are equal, round, and reactive to light.   Cardiovascular:      Rate and Rhythm: Normal rate and regular rhythm.   Pulmonary:      Effort: Pulmonary effort is normal.      Breath sounds: Normal breath sounds.   Abdominal:      General: Abdomen is flat.      Palpations: Abdomen is soft.      Tenderness: There is no right CVA tenderness or left CVA tenderness.   Lymphadenopathy:      Cervical: No cervical adenopathy.   Skin:     General: Skin is warm and dry.      Capillary Refill: Capillary refill takes less than 2 seconds.   Neurological:      Mental Status: She is alert and oriented to person, place, and time.       RADIOLOGY RESULTS   Dx-chest-2 Views    Result Date: 6/5/2020 6/5/2020 10:10 AM HISTORY/REASON FOR EXAM:  Cough TECHNIQUE/EXAM DESCRIPTION AND NUMBER OF VIEWS: Two views of the chest. COMPARISON:  9/19/2016. FINDINGS: The cardiac silhouette  and mediastinal contours are normal. No discrete opacity, pleural fluid or pneumothorax. No suspicious bony lesions.     No acute cardiopulmonary findings.               Assessment/Plan:     Diagnosis and associated orders:     1. Cough  DX-CHEST-2 VIEWS   2. Suspected COVID-19 virus infection     3. Diarrhea, unspecified type        Comments/MDM:       • Pt declined rapid flu as she does not tolerate nasal swabs.  Since she has a lack of fever this may be unnecessary  • Vital signs, chest xray, exam, and 2 minute walk test with pulse oximeter suggest this patient is not a high risk patient.  Pt declined covid testing as she wants rapid results.  We discussed the utility vs disutility of blood work and in the abscence of a positive PCR test we had shared  decision making this is unnecessary.  She has a totally nontender abdomen and despite her overall malaise I do not see a foci of infection. Counseled pt on return precautions and reasons to return to clinic vs. ER. Pt demonstrated verbal understanding.                Differential diagnosis, natural history, supportive care, and indications for immediate follow-up discussed.    Advised the patient to follow-up with the primary care physician for recheck, reevaluation, and consideration of further management.    Please note that this dictation was created using voice recognition software. I have made reasonable attempt to correct obvious errors, but I expect that there are errors of grammar and possibly content that I did not discover before finalizing the note.    This note was electronically signed by Sammy Rhoades PA-C

## 2020-06-05 NOTE — TELEPHONE ENCOUNTER
1. Caller Name:Rachel Jacobs  Call Back Number: [phone]  [facility] Yes         2.  In the last two weeks, has the patient had any new or worsening symptoms (not explained by alternative diagnosis)? Yes, the patient reports the following COVID-19 consistent symptoms: cough, chills, nausea and diarrhea , ha, runny nose x 3 days    3.  Does patient have any comoribidities? None     4.  Has the patient traveled in the last 14 days OR had any known contact with someone who is suspected or confirmed to have COVID-19?  No.    5. Disposition: Advised to go to     Note routed to [facility] Provider: [facility] only.

## 2020-06-08 ENCOUNTER — TELEPHONE (OUTPATIENT)
Dept: HEALTH INFORMATION MANAGEMENT | Facility: OTHER | Age: 50
End: 2020-06-08

## 2020-06-08 NOTE — TELEPHONE ENCOUNTER
Left voicemail for patient stating CDC guidelines for home isolation and contact information for medical questions & COVID testing through [facility]    CDC guidelines for home isolation.   a. Staying home and frequently washing your hands, and disinfecting commonly used household items (such as cellphone, remote, door handles, tabletops, faucets, etc.)  b.  You are wearing a mask or some sort of effective personal protection equipment (I can provide resources for them if needed), as well as covering your cough and avoiding sharing household items.   c. You are staying in your own room; besides caretaker coming and going to help-out, you are sleeping in your own space away from others.       If you have any medical questions or other community resources, please contact…  d. If it is a medical emergency, please call [phone].  e. Your Primary Care Provider, if you do not have one, I can provide information on clinics and telehealth doctors.   f. [facility]: [phone]  g. [facility] Triage Line: [phone]

## 2020-07-08 DIAGNOSIS — F33.40 MDD (RECURRENT MAJOR DEPRESSIVE DISORDER) IN REMISSION (HCC): ICD-10-CM

## 2020-07-08 DIAGNOSIS — F41.1 GAD (GENERALIZED ANXIETY DISORDER): ICD-10-CM

## 2020-07-09 RX ORDER — PAROXETINE 10 MG/1
10 TABLET, FILM COATED ORAL DAILY
Qty: 30 TAB | Refills: 0 | Status: SHIPPED | OUTPATIENT
Start: 2020-07-09 | End: 2020-08-23 | Stop reason: SDUPTHER

## 2020-08-19 ENCOUNTER — TELEMEDICINE (OUTPATIENT)
Dept: MEDICAL GROUP | Age: 50
End: 2020-08-19
Payer: OTHER MISCELLANEOUS

## 2020-08-19 VITALS — WEIGHT: 130 LBS | HEIGHT: 67 IN | BODY MASS INDEX: 20.4 KG/M2 | TEMPERATURE: 101.6 F

## 2020-08-19 DIAGNOSIS — R52 BODY ACHES: ICD-10-CM

## 2020-08-19 DIAGNOSIS — R50.9 FEVER AND CHILLS: ICD-10-CM

## 2020-08-19 DIAGNOSIS — R43.0 LOSS OF SMELL: ICD-10-CM

## 2020-08-19 DIAGNOSIS — J02.9 SORE THROAT: ICD-10-CM

## 2020-08-19 DIAGNOSIS — R43.2 LOSS OF TASTE: ICD-10-CM

## 2020-08-19 DIAGNOSIS — R19.7 DIARRHEA, UNSPECIFIED TYPE: ICD-10-CM

## 2020-08-19 DIAGNOSIS — R51.9 NONINTRACTABLE EPISODIC HEADACHE, UNSPECIFIED HEADACHE TYPE: ICD-10-CM

## 2020-08-19 PROCEDURE — 99214 OFFICE O/P EST MOD 30 MIN: CPT | Mod: 95,CR | Performed by: INTERNAL MEDICINE

## 2020-08-19 ASSESSMENT — FIBROSIS 4 INDEX: FIB4 SCORE: .979911869877731774

## 2020-08-19 NOTE — PROGRESS NOTES
Telemedicine Visit: Established Patient     This Remote Face to Face encounter was conducted via Zoom. Given the importance of social distancing and other strategies recommended to reduce the risk of COVID-19 transmission, I am providing medical care to this patient via audio/video visit in place of an in person visit at the request of the patient. Verbal consent to telehealth, risks, benefits, and consequences were discussed. Patient retains the right to withdraw at any time. All existing confidentiality protections apply. The patient has access to all transmitted medical information. No dissemination of any patient images or information to other entities without further written consent.    CHIEF COMPLAINT     Chief Complaint   Patient presents with   • Headache     body aches, diarrhea x 6 days   • Pharyngitis     dry cough     HPI  Rachel Diamond Jacobs is a 50 y.o. female who presents today for the following     Covid sx  The patient had cough/Covid sx in June 2020, more than 2 months ago, was seen in urgent care on 6/5/2020, note was reviewed:  -Chest x-ray came back without acute cardiopulmonary process  -Patient declined covid test  -Advised symptomatic treatment    She developed new symptoms almost a week ago:  · Fever or chills  · Cough  · Shortness of breath  · Fatigue  · Muscle or body aches  · Headache  · New loss of taste or smell  · Congestion or runny nose  · Nausea   · Diarrhea    At the beginning of symptoms, on 8/13/2020, 6 days ago, contacted our [facility], pending covid test.  I still patiently  Reviewed PMH, PSH, FH, SH, ALL, HCM/IMM, no changes  Reviewed MEDS, no changes    Patient Active Problem List    Diagnosis Date Noted   • Major depressive disorder, severe (HCC) 05/05/2020   • Stress 05/05/2020   • AIN grade I 09/27/2019   • Polyp of colon 05/14/2019   • Colon cancer screening 07/20/2018   • Screening for malignant neoplasm of breast 07/20/2018   • Tobacco use 03/31/2016   •  Macrocytosis without anemia 09/17/2015   • WILFRID (generalized anxiety disorder) 08/11/2015   • Insomnia 06/11/2015   • History of nephrolithiasis 06/11/2015   • Depression 06/12/2013     CURRENT MEDICATIONS  Current Outpatient Medications   Medication Sig Dispense Refill   • PARoxetine (PAXIL) 10 MG Tab Take 1 Tab by mouth every day. 30 Tab 0   • Omega-3 Fatty Acids (FISH OIL) 1000 MG Cap capsule Take 1,000 mg by mouth every day.       No current facility-administered medications for this visit.      ALLERGIES  Allergies: Pcn [penicillins]  PAST MEDICAL HISTORY  Past Medical History:   Diagnosis Date   • Macrocytosis 3/2014   • Depression    • Endometriosis    • Nephrolithiasis      SURGICAL HISTORY  She  has a past surgical history that includes meniscus repair; cystoscopy (Left, 5/25/2018); ureteroscopy (Left, 5/25/2018); abdominal hysterectomy total; and destruction of anal warts (9/27/2019).  SOCIAL HISTORY  Social History     Tobacco Use   • Smoking status: Current Every Day Smoker     Packs/day: 0.10     Types: Cigarettes   • Smokeless tobacco: Never Used   Substance Use Topics   • Alcohol use: Yes     Alcohol/week: 0.6 oz     Types: 1 Standard drinks or equivalent per week     Comment: Occasionally   • Drug use: Yes     Types: Inhaled     Social History     Social History Narrative    Lives with boyfriend.     Children: 1    Work: office     FAMILY HISTORY  Family History   Problem Relation Age of Onset   • Diabetes Mother    • Allergies Mother    • Hypertension Mother    • Hyperlipidemia Mother    • Other Father         unknown   • Genitourinary () Problems Father    • Heart Disease Neg Hx    • Psychiatric Illness Neg Hx    • Stroke Neg Hx    • Thyroid Neg Hx      Family Status   Relation Name Status   • Mo  Alive   • Fa  Other   • Neg Hx  (Not Specified)     ROS   Constitutional: As above.  HENT: As above.  Eyes: Negative for vision problems.   Respiratory: As above.  Cardiovascular: Negative for chest  "pain, palpitations.   Gastrointestinal: As above.   Musculoskeletal: As above.   Skin: Negative for rash.   Neuro: Negative for dizziness, weakness.   Endo/Heme/Allergies: Does not bruise/bleed easily.   Psychiatric/Behavioral: Negative for depression.    Objective   Vitals obtained by patient:  Temperature (Abnormal) 38.7 °C (101.6 °F)   Height 1.702 m (5' 7\")   Weight 59 kg (130 lb)   Last Menstrual Period 09/27/2008   Body Mass Index 20.36 kg/m²   Physical Exam:  Constitutional: Alert, crying, appeared depressed and acutely sick  Skin: No rash in visible areas.  Eye: Round. Conjunctiva clear, lids normal.  ENMT: Lips pink without lesions, good dentition. Phonation normal.  Neck: No visible masses or thyromegaly. Moves freely without pain.  CV: no peripheral cyanosis, tachycardia.  Respiratory: Unlabored respiratory effort, no cough or audible wheezing.  Psych: Alert and oriented x3, normal affect and mood.     Labs     Labs are reviewed and discussed with a patient  Lab Results   Component Value Date/Time    CHOLSTRLTOT 179 09/15/2015 08:24 AM     (H) 09/15/2015 08:24 AM    HDL 60 09/15/2015 08:24 AM    TRIGLYCERIDE 72 09/15/2015 08:24 AM       Lab Results   Component Value Date/Time    SODIUM 143 11/18/2019 06:04 PM    POTASSIUM 4.3 11/18/2019 06:04 PM    CHLORIDE 103 11/18/2019 06:04 PM    CO2 27 11/18/2019 06:04 PM    GLUCOSE 117 (H) 11/18/2019 06:04 PM    BUN 15 11/18/2019 06:04 PM    CREATININE 0.73 11/18/2019 06:04 PM     Lab Results   Component Value Date/Time    ALKPHOSPHAT 91 11/18/2019 06:04 PM    ASTSGOT 15 11/18/2019 06:04 PM    ALTSGPT 11 11/18/2019 06:04 PM    TBILIRUBIN 0.8 11/18/2019 06:04 PM      No results found for: HBA1C  No results found for: TSH  No results found for: FREET4    Lab Results   Component Value Date/Time    WBC 13.3 (H) 11/18/2019 06:04 PM    RBC 4.53 11/18/2019 06:04 PM    HEMOGLOBIN 14.6 11/18/2019 06:04 PM    HEMATOCRIT 43.0 11/18/2019 06:04 PM    MCV 94.9 " 11/18/2019 06:04 PM    MCH 32.2 11/18/2019 06:04 PM    MCHC 34.0 11/18/2019 06:04 PM    MPV 9.9 11/18/2019 06:04 PM    NEUTSPOLYS 72.90 (H) 11/18/2019 06:04 PM    LYMPHOCYTES 18.60 (L) 11/18/2019 06:04 PM    MONOCYTES 6.20 11/18/2019 06:04 PM    EOSINOPHILS 1.10 11/18/2019 06:04 PM    BASOPHILS 0.50 11/18/2019 06:04 PM      Imaging      Reviewed and discussed per HPI    Assessment and Plan     Rachel Jacobs is a 50 y.o. female    1. Sore throat  2. Fever and chills  3. Body aches  4. Nonintractable episodic headache, unspecified headache type  5. Diarrhea, unspecified type  6. Loss of taste  7. Loss of smell  She has old covid sx  -Advised self-isolation, good fluid intake, to reconnect with our [facility]    Follow-up: As needed

## 2020-08-23 DIAGNOSIS — F41.1 GAD (GENERALIZED ANXIETY DISORDER): ICD-10-CM

## 2020-08-23 DIAGNOSIS — F33.40 MDD (RECURRENT MAJOR DEPRESSIVE DISORDER) IN REMISSION (HCC): ICD-10-CM

## 2020-08-23 RX ORDER — PAROXETINE 10 MG/1
10 TABLET, FILM COATED ORAL DAILY
Qty: 30 TAB | Refills: 0 | Status: SHIPPED | OUTPATIENT
Start: 2020-08-23 | End: 2020-09-23 | Stop reason: SDUPTHER

## 2020-09-23 DIAGNOSIS — F41.1 GAD (GENERALIZED ANXIETY DISORDER): ICD-10-CM

## 2020-09-23 DIAGNOSIS — F33.40 MDD (RECURRENT MAJOR DEPRESSIVE DISORDER) IN REMISSION (HCC): ICD-10-CM

## 2020-09-24 RX ORDER — PAROXETINE 10 MG/1
10 TABLET, FILM COATED ORAL DAILY
Qty: 30 TAB | Refills: 0 | Status: SHIPPED | OUTPATIENT
Start: 2020-09-24 | End: 2020-10-29 | Stop reason: SDUPTHER

## 2020-10-29 DIAGNOSIS — F33.40 MDD (RECURRENT MAJOR DEPRESSIVE DISORDER) IN REMISSION (HCC): ICD-10-CM

## 2020-10-29 DIAGNOSIS — F41.1 GAD (GENERALIZED ANXIETY DISORDER): ICD-10-CM

## 2020-11-01 RX ORDER — PAROXETINE 10 MG/1
10 TABLET, FILM COATED ORAL DAILY
Qty: 30 TAB | Refills: 0 | Status: SHIPPED | OUTPATIENT
Start: 2020-11-01 | End: 2020-12-09 | Stop reason: SDUPTHER

## 2020-12-07 ENCOUNTER — PATIENT MESSAGE (OUTPATIENT)
Dept: MEDICAL GROUP | Age: 50
End: 2020-12-07

## 2020-12-07 DIAGNOSIS — F41.1 GAD (GENERALIZED ANXIETY DISORDER): ICD-10-CM

## 2020-12-07 DIAGNOSIS — F33.40 MDD (RECURRENT MAJOR DEPRESSIVE DISORDER) IN REMISSION (HCC): ICD-10-CM

## 2020-12-07 RX ORDER — PAROXETINE 10 MG/1
10 TABLET, FILM COATED ORAL DAILY
Qty: 30 TAB | Refills: 0
Start: 2020-12-07

## 2020-12-08 ENCOUNTER — APPOINTMENT (OUTPATIENT)
Dept: MEDICAL GROUP | Age: 50
End: 2020-12-08
Payer: OTHER MISCELLANEOUS

## 2020-12-08 NOTE — TELEPHONE ENCOUNTER
Phone Number Called: [phone]    Call outcome: Spoke to patient regarding message below.    Message: Patient has appointment 12/09

## 2020-12-09 ENCOUNTER — TELEMEDICINE (OUTPATIENT)
Dept: MEDICAL GROUP | Age: 50
End: 2020-12-09
Payer: OTHER MISCELLANEOUS

## 2020-12-09 VITALS — HEIGHT: 67 IN | TEMPERATURE: 98.2 F | BODY MASS INDEX: 20.4 KG/M2 | WEIGHT: 130 LBS

## 2020-12-09 DIAGNOSIS — Z12.11 SCREENING FOR MALIGNANT NEOPLASM OF COLON: ICD-10-CM

## 2020-12-09 DIAGNOSIS — F41.1 GAD (GENERALIZED ANXIETY DISORDER): ICD-10-CM

## 2020-12-09 DIAGNOSIS — Z72.0 TOBACCO USE: ICD-10-CM

## 2020-12-09 DIAGNOSIS — Z20.822 EXPOSURE TO COVID-19 VIRUS: ICD-10-CM

## 2020-12-09 DIAGNOSIS — Z12.31 ENCOUNTER FOR SCREENING MAMMOGRAM FOR MALIGNANT NEOPLASM OF BREAST: ICD-10-CM

## 2020-12-09 DIAGNOSIS — R11.0 NAUSEA: ICD-10-CM

## 2020-12-09 DIAGNOSIS — R19.7 DIARRHEA, UNSPECIFIED TYPE: ICD-10-CM

## 2020-12-09 DIAGNOSIS — F32.2 MAJOR DEPRESSIVE DISORDER, SEVERE (HCC): ICD-10-CM

## 2020-12-09 DIAGNOSIS — R05.9 COUGH: ICD-10-CM

## 2020-12-09 PROCEDURE — 99214 OFFICE O/P EST MOD 30 MIN: CPT | Mod: 95,CS,CR | Performed by: INTERNAL MEDICINE

## 2020-12-09 RX ORDER — PAROXETINE 10 MG/1
10 TABLET, FILM COATED ORAL DAILY
Qty: 90 TAB | Refills: 3 | Status: SHIPPED | OUTPATIENT
Start: 2020-12-09 | End: 2021-02-10 | Stop reason: SDUPTHER

## 2020-12-09 ASSESSMENT — PATIENT HEALTH QUESTIONNAIRE - PHQ9: CLINICAL INTERPRETATION OF PHQ2 SCORE: 0

## 2020-12-09 ASSESSMENT — FIBROSIS 4 INDEX: FIB4 SCORE: .979911869877731774
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
## 2020-12-11 ENCOUNTER — HOSPITAL ENCOUNTER (OUTPATIENT)
Dept: LAB | Facility: MEDICAL CENTER | Age: 50
End: 2020-12-11
Attending: INTERNAL MEDICINE
Payer: OTHER MISCELLANEOUS

## 2020-12-11 DIAGNOSIS — R19.7 DIARRHEA, UNSPECIFIED TYPE: ICD-10-CM

## 2020-12-11 DIAGNOSIS — Z20.822 EXPOSURE TO COVID-19 VIRUS: ICD-10-CM

## 2020-12-11 DIAGNOSIS — R11.0 NAUSEA: ICD-10-CM

## 2020-12-11 PROCEDURE — C9803 HOPD COVID-19 SPEC COLLECT: HCPCS

## 2020-12-11 PROCEDURE — U0003 INFECTIOUS AGENT DETECTION BY NUCLEIC ACID (DNA OR RNA); SEVERE ACUTE RESPIRATORY SYNDROME CORONAVIRUS 2 (SARS-COV-2) (CORONAVIRUS DISEASE [COVID-19]), AMPLIFIED PROBE TECHNIQUE, MAKING USE OF HIGH THROUGHPUT TECHNOLOGIES AS DESCRIBED BY CMS-2020-01-R: HCPCS

## 2020-12-12 LAB
COVID ORDER STATUS COVID19: NORMAL
SARS-COV-2 RNA RESP QL NAA+PROBE: NOTDETECTED
SPECIMEN SOURCE: NORMAL

## 2020-12-19 NOTE — PROGRESS NOTES
I have reviewed and agree with history, assessment and plan for office encounter on 6/5/2020 with Advanced Practice Provider: Sammy Rhoades PAC.  Face to face encounter/direct observation: No  Suggested changes to plan or follow-up: none   Cameron Aguiar M.D.

## 2021-02-10 ENCOUNTER — PATIENT MESSAGE (OUTPATIENT)
Dept: MEDICAL GROUP | Age: 51
End: 2021-02-10

## 2021-02-10 DIAGNOSIS — Z72.0 TOBACCO USE: ICD-10-CM

## 2021-02-10 DIAGNOSIS — F41.1 GAD (GENERALIZED ANXIETY DISORDER): ICD-10-CM

## 2021-02-10 DIAGNOSIS — F51.01 PRIMARY INSOMNIA: ICD-10-CM

## 2021-02-10 DIAGNOSIS — F32.2 MAJOR DEPRESSIVE DISORDER, SEVERE (HCC): ICD-10-CM

## 2021-02-10 RX ORDER — ALPRAZOLAM 0.5 MG/1
0.5 TABLET ORAL NIGHTLY PRN
Qty: 30 TABLET | Refills: 0 | Status: SHIPPED | OUTPATIENT
Start: 2021-02-10 | End: 2021-07-15

## 2021-02-10 RX ORDER — PAROXETINE 10 MG/1
10 TABLET, FILM COATED ORAL DAILY
Qty: 90 TABLET | Refills: 0 | Status: SHIPPED | OUTPATIENT
Start: 2021-02-10 | End: 2021-07-02 | Stop reason: SDUPTHER

## 2021-02-10 NOTE — TELEPHONE ENCOUNTER
From: Rachel Jacobs  To: Physician Juliano Negron  Sent: 2/10/2021 7:17 AM PST  Subject: Prescription Question    I am starting a new job on 2.15.21, will not have insurance for atleast 90 days and would like to know if I can get 3 months of my paroxetine & Xanax filled? Kindly advise thank you

## 2021-02-10 NOTE — PATIENT COMMUNICATION
Received request via: Patient    Was the patient seen in the last year in this department? Yes    Does the patient have an active prescription (recently filled or refills available) for medication(s) requested? PATIENT REQUESTING 90 DAY SUPLLY WILL HAVE NO INSURANCE FOR 3 MONTHS

## 2021-05-13 ENCOUNTER — TELEMEDICINE (OUTPATIENT)
Dept: MEDICAL GROUP | Age: 51
End: 2021-05-13
Payer: COMMERCIAL

## 2021-05-13 VITALS — TEMPERATURE: 100.7 F | HEIGHT: 67 IN | WEIGHT: 137 LBS | BODY MASS INDEX: 21.5 KG/M2

## 2021-05-13 DIAGNOSIS — Z87.442 HISTORY OF NEPHROLITHIASIS: ICD-10-CM

## 2021-05-13 DIAGNOSIS — R10.9 FLANK PAIN, ACUTE: Primary | ICD-10-CM

## 2021-05-13 PROBLEM — Z12.11 COLON CANCER SCREENING: Status: RESOLVED | Noted: 2018-07-20 | Resolved: 2021-05-13

## 2021-05-13 PROCEDURE — 99213 OFFICE O/P EST LOW 20 MIN: CPT | Mod: 95 | Performed by: FAMILY MEDICINE

## 2021-05-13 RX ORDER — TAMSULOSIN HYDROCHLORIDE 0.4 MG/1
0.4 CAPSULE ORAL DAILY
Qty: 30 CAPSULE | Refills: 0 | Status: SHIPPED | OUTPATIENT
Start: 2021-05-13 | End: 2021-11-23

## 2021-05-13 RX ORDER — HYDROCODONE BITARTRATE AND ACETAMINOPHEN 5; 325 MG/1; MG/1
1 TABLET ORAL 2 TIMES DAILY
Qty: 14 TABLET | Refills: 0 | Status: SHIPPED | OUTPATIENT
Start: 2021-05-13 | End: 2021-05-20

## 2021-05-13 RX ORDER — SULFAMETHOXAZOLE AND TRIMETHOPRIM 800; 160 MG/1; MG/1
1 TABLET ORAL EVERY 12 HOURS
Qty: 28 TABLET | Refills: 0 | Status: SHIPPED | OUTPATIENT
Start: 2021-05-13 | End: 2021-05-27

## 2021-05-13 ASSESSMENT — PATIENT HEALTH QUESTIONNAIRE - PHQ9
9. THOUGHTS THAT YOU WOULD BE BETTER OFF DEAD, OR OF HURTING YOURSELF: NOT AT ALL
SUM OF ALL RESPONSES TO PHQ9 QUESTIONS 1 AND 2: 0
2. FEELING DOWN, DEPRESSED, IRRITABLE, OR HOPELESS: NOT AT ALL
3. TROUBLE FALLING OR STAYING ASLEEP OR SLEEPING TOO MUCH: NOT AT ALL
5. POOR APPETITE OR OVEREATING: NOT AT ALL
SUM OF ALL RESPONSES TO PHQ QUESTIONS 1-9: 0
4. FEELING TIRED OR HAVING LITTLE ENERGY: NOT AT ALL
8. MOVING OR SPEAKING SO SLOWLY THAT OTHER PEOPLE COULD HAVE NOTICED. OR THE OPPOSITE, BEING SO FIGETY OR RESTLESS THAT YOU HAVE BEEN MOVING AROUND A LOT MORE THAN USUAL: NOT AT ALL
1. LITTLE INTEREST OR PLEASURE IN DOING THINGS: NOT AT ALL
7. TROUBLE CONCENTRATING ON THINGS, SUCH AS READING THE NEWSPAPER OR WATCHING TELEVISION: NOT AT ALL
6. FEELING BAD ABOUT YOURSELF - OR THAT YOU ARE A FAILURE OR HAVE LET YOURSELF OR YOUR FAMILY DOWN: NOT AL ALL

## 2021-05-13 ASSESSMENT — FIBROSIS 4 INDEX: FIB4 SCORE: 1

## 2021-05-13 NOTE — PROGRESS NOTES
"Virtual Visit: Established Patient   This visit was conducted via Zoom using secure and encrypted videoconferencing technology. The patient was in a private location in the state of [place].    The patient's identity was confirmed and verbal consent was obtained for this virtual visit.    Subjective:   CC: acute left flank pain     Rachel Jacobs is a 51 y.o. female with hx of intermittent kidney stone s/p cystoscopy/ureteroscopy in 2018. 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 She complained of acute development of left flank pain approximately 4 days ago. Pain is described as sharp, comes in waves, and progressive getting worse over the past few days.  Patient also complains of chills and fever up to 100.7.  Patient has been taking ibuprofen 600 mg several times per day for pain.  She also take leftover Norco 5-325 mg daily.  Patient has been drinking lots of fluid.  Pain is debilitating today and interfering with her work.  Denies hematuria, dysuria, urinary urgency/frequency, nausea, vomiting.  Patient does not want to go to ER for her symptoms.  Therefore, she wants to manage her symptoms conservatively at home.  However, pain is unbearable today.  She therefore decided to schedule a virtual visit for consultation.       ROS   Denies any recent fevers or chills. No nausea or vomiting. No chest pains or shortness of breath.     Allergies   Allergen Reactions   • Pcn [Penicillins] Shortness of Breath     \"SOB, increase Heart beat\"  Patient stated she can't take  Amoxicillin either       Current medicines (including changes today)  Current Outpatient Medications   Medication Sig Dispense Refill   • sulfamethoxazole-trimethoprim (BACTRIM DS) 800-160 MG tablet Take 1 tablet by mouth every 12 hours for 14 days. 28 tablet 0   • tamsulosin (FLOMAX) 0.4 MG capsule Take 1 capsule by mouth every day. 30 capsule 0   • HYDROcodone-acetaminophen (NORCO) 5-325 MG Tab per tablet Take 1 tablet by mouth 2 " "times a day for 7 days. 14 tablet 0   • PARoxetine (PAXIL) 10 MG Tab Take 1 tablet by mouth every day. 90 tablet 0   • ALPRAZolam (XANAX) 0.5 MG Tab Take 1 tablet by mouth at bedtime as needed for Sleep for up to 30 days. 30 tablet 0     No current facility-administered medications for this visit.       Patient Active Problem List    Diagnosis Date Noted   • Major depressive disorder, severe (HCC) 05/05/2020   • Stress 05/05/2020   • AIN grade I 09/27/2019   • Polyp of colon 05/14/2019   • Screening for malignant neoplasm of breast 07/20/2018   • Tobacco use 03/31/2016   • Macrocytosis without anemia 09/17/2015   • WILFRID (generalized anxiety disorder) 08/11/2015   • Insomnia 06/11/2015   • History of nephrolithiasis 06/11/2015   • Depression 06/12/2013       Family History   Problem Relation Age of Onset   • Diabetes Mother    • Allergies Mother    • Hypertension Mother    • Hyperlipidemia Mother    • Other Father         unknown   • Genitourinary () Problems Father    • Heart Disease Neg Hx    • Psychiatric Illness Neg Hx    • Stroke Neg Hx    • Thyroid Neg Hx        She  has a past medical history of Depression, Endometriosis, Macrocytosis (3/2014), and Nephrolithiasis.  She  has a past surgical history that includes meniscus repair; cystoscopy (Left, 5/25/2018); ureteroscopy (Left, 5/25/2018); abdominal hysterectomy total; and destruction of anal warts (9/27/2019).       Objective:   Temp (!) 38.2 °C (100.7 °F) (Oral) Comment: pt stated  Ht 1.702 m (5' 7\")   Wt 62.1 kg (137 lb) Comment: pt stated  LMP 09/27/2008   BMI 21.46 kg/m²     Physical Exam:  Constitutional: Alert, no distress, well-groomed.  Skin: No rashes in visible areas.  Eye: Round. Conjunctiva clear, lids normal. No icterus.   ENMT: Lips pink without lesions, good dentition, moist mucous membranes. Phonation normal.  Neck: No masses, no thyromegaly. Moves freely without pain.  Respiratory: Unlabored respiratory effort, no cough or audible " wheeze  Psych: Alert and oriented x3, normal affect and mood.       Assessment and Plan:   The following treatment plan was discussed:     1. History of nephrolithiasis  2. Flank pain, acute  - sulfamethoxazole-trimethoprim (BACTRIM DS) 800-160 MG tablet; Take 1 tablet by mouth every 12 hours for 14 days.  Dispense: 28 tablet; Refill: 0  - tamsulosin (FLOMAX) 0.4 MG capsule; Take 1 capsule by mouth every day.  Dispense: 30 capsule; Refill: 0  - HYDROcodone-acetaminophen (NORCO) 5-325 MG Tab per tablet; Take 1 tablet by mouth 2 times a day for 7 days.  Dispense: 14 tablet; Refill: 0  - Consent for Opiate Prescription  - Risks, benefits, side effects, as well as potential health complications associated with Norco was discussed with patient. Appropriate counseling provided.    - pt does not want to go to ER.   - indications for ER evaluation discussed.            Follow-up: Return for follow up with PCP.

## 2021-07-02 DIAGNOSIS — F41.1 GAD (GENERALIZED ANXIETY DISORDER): ICD-10-CM

## 2021-07-02 DIAGNOSIS — F32.2 MAJOR DEPRESSIVE DISORDER, SEVERE (HCC): ICD-10-CM

## 2021-07-02 DIAGNOSIS — Z72.0 TOBACCO USE: ICD-10-CM

## 2021-07-02 RX ORDER — PAROXETINE 10 MG/1
10 TABLET, FILM COATED ORAL DAILY
Qty: 90 TABLET | Refills: 0 | Status: SHIPPED | OUTPATIENT
Start: 2021-07-02 | End: 2021-11-03 | Stop reason: SDUPTHER

## 2021-07-15 DIAGNOSIS — F32.2 MAJOR DEPRESSIVE DISORDER, SEVERE (HCC): ICD-10-CM

## 2021-07-15 DIAGNOSIS — F51.01 PRIMARY INSOMNIA: ICD-10-CM

## 2021-07-15 DIAGNOSIS — F41.1 GAD (GENERALIZED ANXIETY DISORDER): ICD-10-CM

## 2021-07-15 RX ORDER — ALPRAZOLAM 0.5 MG/1
0.5 TABLET ORAL NIGHTLY PRN
Qty: 30 TABLET | Refills: 0 | Status: SHIPPED | OUTPATIENT
Start: 2021-07-15 | End: 2021-08-14

## 2021-08-20 ENCOUNTER — PATIENT MESSAGE (OUTPATIENT)
Dept: MEDICAL GROUP | Age: 51
End: 2021-08-20

## 2021-08-26 ENCOUNTER — PATIENT MESSAGE (OUTPATIENT)
Dept: MEDICAL GROUP | Age: 51
End: 2021-08-26

## 2021-11-03 DIAGNOSIS — F32.2 MAJOR DEPRESSIVE DISORDER, SEVERE (HCC): ICD-10-CM

## 2021-11-03 DIAGNOSIS — F41.1 GAD (GENERALIZED ANXIETY DISORDER): ICD-10-CM

## 2021-11-03 DIAGNOSIS — Z72.0 TOBACCO USE: ICD-10-CM

## 2021-11-04 RX ORDER — PAROXETINE 10 MG/1
10 TABLET, FILM COATED ORAL DAILY
Qty: 90 TABLET | Refills: 0 | Status: SHIPPED | OUTPATIENT
Start: 2021-11-04 | End: 2021-11-23 | Stop reason: SDUPTHER

## 2021-11-10 ENCOUNTER — HOSPITAL ENCOUNTER (EMERGENCY)
Facility: MEDICAL CENTER | Age: 51
End: 2021-11-10
Attending: EMERGENCY MEDICINE
Payer: COMMERCIAL

## 2021-11-10 ENCOUNTER — APPOINTMENT (OUTPATIENT)
Dept: RADIOLOGY | Facility: MEDICAL CENTER | Age: 51
End: 2021-11-10
Attending: EMERGENCY MEDICINE
Payer: COMMERCIAL

## 2021-11-10 VITALS
RESPIRATION RATE: 18 BRPM | BODY MASS INDEX: 21.49 KG/M2 | DIASTOLIC BLOOD PRESSURE: 71 MMHG | WEIGHT: 136.91 LBS | SYSTOLIC BLOOD PRESSURE: 118 MMHG | TEMPERATURE: 98.1 F | HEART RATE: 56 BPM | HEIGHT: 67 IN | OXYGEN SATURATION: 95 %

## 2021-11-10 DIAGNOSIS — N20.0 NEPHROLITHIASIS: ICD-10-CM

## 2021-11-10 LAB
ALBUMIN SERPL BCP-MCNC: 4.5 G/DL (ref 3.2–4.9)
ALBUMIN/GLOB SERPL: 1.7 G/DL
ALP SERPL-CCNC: 90 U/L (ref 30–99)
ALT SERPL-CCNC: 10 U/L (ref 2–50)
ANION GAP SERPL CALC-SCNC: 10 MMOL/L (ref 7–16)
APPEARANCE UR: ABNORMAL
AST SERPL-CCNC: 15 U/L (ref 12–45)
BACTERIA #/AREA URNS HPF: ABNORMAL /HPF
BASOPHILS # BLD AUTO: 0.7 % (ref 0–1.8)
BASOPHILS # BLD: 0.06 K/UL (ref 0–0.12)
BILIRUB SERPL-MCNC: 0.8 MG/DL (ref 0.1–1.5)
BILIRUB UR QL STRIP.AUTO: NEGATIVE
BUN SERPL-MCNC: 12 MG/DL (ref 8–22)
CALCIUM SERPL-MCNC: 9.2 MG/DL (ref 8.4–10.2)
CHLORIDE SERPL-SCNC: 106 MMOL/L (ref 96–112)
CO2 SERPL-SCNC: 25 MMOL/L (ref 20–33)
COLOR UR: YELLOW
CREAT SERPL-MCNC: 0.71 MG/DL (ref 0.5–1.4)
EOSINOPHIL # BLD AUTO: 0.22 K/UL (ref 0–0.51)
EOSINOPHIL NFR BLD: 2.6 % (ref 0–6.9)
EPI CELLS #/AREA URNS HPF: ABNORMAL /HPF
ERYTHROCYTE [DISTWIDTH] IN BLOOD BY AUTOMATED COUNT: 42.1 FL (ref 35.9–50)
GLOBULIN SER CALC-MCNC: 2.6 G/DL (ref 1.9–3.5)
GLUCOSE SERPL-MCNC: 105 MG/DL (ref 65–99)
GLUCOSE UR STRIP.AUTO-MCNC: NEGATIVE MG/DL
HCG UR QL: NEGATIVE
HCT VFR BLD AUTO: 44.4 % (ref 37–47)
HGB BLD-MCNC: 15 G/DL (ref 12–16)
IMM GRANULOCYTES # BLD AUTO: 0.03 K/UL (ref 0–0.11)
IMM GRANULOCYTES NFR BLD AUTO: 0.4 % (ref 0–0.9)
KETONES UR STRIP.AUTO-MCNC: NEGATIVE MG/DL
LEUKOCYTE ESTERASE UR QL STRIP.AUTO: NEGATIVE
LIPASE SERPL-CCNC: 36 U/L (ref 7–58)
LYMPHOCYTES # BLD AUTO: 2.02 K/UL (ref 1–4.8)
LYMPHOCYTES NFR BLD: 23.6 % (ref 22–41)
MCH RBC QN AUTO: 33.7 PG (ref 27–33)
MCHC RBC AUTO-ENTMCNC: 33.8 G/DL (ref 33.6–35)
MCV RBC AUTO: 99.8 FL (ref 81.4–97.8)
MICRO URNS: ABNORMAL
MONOCYTES # BLD AUTO: 0.52 K/UL (ref 0–0.85)
MONOCYTES NFR BLD AUTO: 6.1 % (ref 0–13.4)
NEUTROPHILS # BLD AUTO: 5.71 K/UL (ref 2–7.15)
NEUTROPHILS NFR BLD: 66.6 % (ref 44–72)
NITRITE UR QL STRIP.AUTO: NEGATIVE
NRBC # BLD AUTO: 0 K/UL
NRBC BLD-RTO: 0 /100 WBC
PH UR STRIP.AUTO: 7.5 [PH] (ref 5–8)
PLATELET # BLD AUTO: 217 K/UL (ref 164–446)
PMV BLD AUTO: 10.6 FL (ref 9–12.9)
POTASSIUM SERPL-SCNC: 3.8 MMOL/L (ref 3.6–5.5)
PROT SERPL-MCNC: 7.1 G/DL (ref 6–8.2)
PROT UR QL STRIP: NEGATIVE MG/DL
RBC # BLD AUTO: 4.45 M/UL (ref 4.2–5.4)
RBC # URNS HPF: ABNORMAL /HPF
RBC UR QL AUTO: ABNORMAL
SODIUM SERPL-SCNC: 141 MMOL/L (ref 135–145)
SP GR UR STRIP.AUTO: 1.01
WBC # BLD AUTO: 8.6 K/UL (ref 4.8–10.8)
WBC #/AREA URNS HPF: ABNORMAL /HPF

## 2021-11-10 PROCEDURE — 85025 COMPLETE CBC W/AUTO DIFF WBC: CPT

## 2021-11-10 PROCEDURE — 83690 ASSAY OF LIPASE: CPT

## 2021-11-10 PROCEDURE — 700111 HCHG RX REV CODE 636 W/ 250 OVERRIDE (IP): Performed by: EMERGENCY MEDICINE

## 2021-11-10 PROCEDURE — 81025 URINE PREGNANCY TEST: CPT

## 2021-11-10 PROCEDURE — 74176 CT ABD & PELVIS W/O CONTRAST: CPT

## 2021-11-10 PROCEDURE — 80053 COMPREHEN METABOLIC PANEL: CPT

## 2021-11-10 PROCEDURE — 81001 URINALYSIS AUTO W/SCOPE: CPT

## 2021-11-10 PROCEDURE — 99284 EMERGENCY DEPT VISIT MOD MDM: CPT

## 2021-11-10 PROCEDURE — 96374 THER/PROPH/DIAG INJ IV PUSH: CPT

## 2021-11-10 RX ORDER — KETOROLAC TROMETHAMINE 30 MG/ML
15 INJECTION, SOLUTION INTRAMUSCULAR; INTRAVENOUS ONCE
Status: COMPLETED | OUTPATIENT
Start: 2021-11-10 | End: 2021-11-10

## 2021-11-10 RX ADMIN — KETOROLAC TROMETHAMINE 15 MG: 30 INJECTION, SOLUTION INTRAMUSCULAR at 12:11

## 2021-11-10 ASSESSMENT — PAIN DESCRIPTION - PAIN TYPE: TYPE: ACUTE PAIN

## 2021-11-10 ASSESSMENT — FIBROSIS 4 INDEX: FIB4 SCORE: 1

## 2021-11-10 NOTE — ED PROVIDER NOTES
ED Provider Note    CHIEF COMPLAINT  Chief Complaint   Patient presents with   • Nephrolithiasis     c/o L flank pain that started friday, c/o pain w/ urination; hx pf kidney stones       HPI  Rachel Jacobs is a 51 y.o. female who presents to the Emergency Department with a chief complaint of flank pain.   The pain is left sided and started  Friday.  The pain does  migrate to groin.   The patient describes the pain as intermittent.  There is no associated fever.   There is associated vomiting .   There is a history of kidney stones.  There is hematuria.          REVIEW OF SYSTEMS  Positive for fever, Negative for vomiting chills.  As above all other systems are negative.    PAST MEDICAL HISTORY  Past Medical History:   Diagnosis Date   • Macrocytosis 3/2014   • Depression    • Endometriosis    • Nephrolithiasis        FAMILY HISTORY  Family History   Problem Relation Age of Onset   • Diabetes Mother    • Allergies Mother    • Hypertension Mother    • Hyperlipidemia Mother    • Other Father         unknown   • Genitourinary () Problems Father    • Heart Disease Neg Hx    • Psychiatric Illness Neg Hx    • Stroke Neg Hx    • Thyroid Neg Hx         SOCIAL HISTORY  Social History     Tobacco Use   • Smoking status: Current Every Day Smoker     Packs/day: 0.10     Types: Cigarettes   • Smokeless tobacco: Never Used   Vaping Use   • Vaping Use: Every day   • Substances: Nicotine, Flavoring   Substance and Sexual Activity   • Alcohol use: Yes     Alcohol/week: 0.6 oz     Types: 1 Standard drinks or equivalent per week     Comment: Occasionally   • Drug use: Yes     Types: Inhaled, Marijuana     Comment: weed   • Sexual activity: Yes     Partners: Male     Birth control/protection: Surgical       SURGICAL HISTORY   has a past surgical history that includes meniscus repair; cystoscopy (Left, 5/25/2018); ureteroscopy (Left, 5/25/2018); abdominal hysterectomy total; and destruction of anal warts  "(9/27/2019).      CURRENT MEDICATIONS  Reviewed.  See Encounter Summary.  Include   No current facility-administered medications on file prior to encounter.     Current Outpatient Medications on File Prior to Encounter   Medication Sig Dispense Refill   • PARoxetine (PAXIL) 10 MG Tab Take 1 Tablet by mouth every day. 90 Tablet 0   • tamsulosin (FLOMAX) 0.4 MG capsule Take 1 capsule by mouth every day. 30 capsule 0       ALLERGIES  Allergies   Allergen Reactions   • Pcn [Penicillins] Shortness of Breath     \"SOB, increase Heart beat\"  Patient stated she can't take  Amoxicillin either       PHYSICAL EXAM  VITAL SIGNS: /67   Pulse (!) 101   Temp 36.7 °C (98 °F) (Temporal)   Resp 20   Ht 1.702 m (5' 7\")   Wt 62.1 kg (136 lb 14.5 oz)   LMP 09/27/2008   SpO2 97%   BMI 21.44 kg/m²   Constitutional:  Alert , able to answer questions  HENT: Nose is normal in appearance, external ears are normal,  moist mucous membranes  Eyes: Anicteric,  pupils are equal round and reactive, there is no conjunctival drainage or pallor   Neck: The trachea is midline, there is no obvious mass or meningeal signs  Cardiovascular: Good perfusion, regular rate and rhythm without murmurs gallops or rubs  Thorax & Lungs: Respiratory rate and effort are normal. There is normal chest excursion with respiration.  No wheezes rhonchi or rales noted.  Abdomen: Abdomen is normal in appearance, no gross peritoneal signs, nonpulsatile  :   No CVA tenderness to palpation  Musculoskeletal: No deformities noted in all 4 extremities. Actively moves all 4 extremities  Skin: Visualized skin is warm, no erythema, no rash.  Neurologic:  Cranial nerves II through XII are intact there is no focal abnormality noted.  Psychiatric: Normal mood and mentation    RADIOLOGY/PROCEDURES  Imaging Studies:    CT-RENAL COLIC EVALUATION(A/P W/O)   Final Result      1.  Punctate nonobstructing RIGHT kidney stone.   2.  No ureteral stone or hydronephrosis.   3.  " Probable LEFT renal cortical and parapelvic cysts, difficult to evaluate in the absence of intravenous contrast.            Pertinent Labs   Results for orders placed or performed during the hospital encounter of 11/10/21   URINALYSIS CULTURE, IF INDICATED    Specimen: Urine   Result Value Ref Range    Color Yellow     Character Hazy (A)     Specific Gravity 1.010 <1.035    Ph 7.5 5.0 - 8.0    Glucose Negative Negative mg/dL    Ketones Negative Negative mg/dL    Protein Negative Negative mg/dL    Bilirubin Negative Negative    Nitrite Negative Negative    Leukocyte Esterase Negative Negative    Occult Blood Moderate (A) Negative    Micro Urine Req Microscopic    HCG QUALITATIVE UR   Result Value Ref Range    Beta-Hcg Urine Negative Negative   CBC WITH DIFFERENTIAL   Result Value Ref Range    WBC 8.6 4.8 - 10.8 K/uL    RBC 4.45 4.20 - 5.40 M/uL    Hemoglobin 15.0 12.0 - 16.0 g/dL    Hematocrit 44.4 37.0 - 47.0 %    MCV 99.8 (H) 81.4 - 97.8 fL    MCH 33.7 (H) 27.0 - 33.0 pg    MCHC 33.8 33.6 - 35.0 g/dL    RDW 42.1 35.9 - 50.0 fL    Platelet Count 217 164 - 446 K/uL    MPV 10.6 9.0 - 12.9 fL    Neutrophils-Polys 66.60 44.00 - 72.00 %    Lymphocytes 23.60 22.00 - 41.00 %    Monocytes 6.10 0.00 - 13.40 %    Eosinophils 2.60 0.00 - 6.90 %    Basophils 0.70 0.00 - 1.80 %    Immature Granulocytes 0.40 0.00 - 0.90 %    Nucleated RBC 0.00 /100 WBC    Neutrophils (Absolute) 5.71 2.00 - 7.15 K/uL    Lymphs (Absolute) 2.02 1.00 - 4.80 K/uL    Monos (Absolute) 0.52 0.00 - 0.85 K/uL    Eos (Absolute) 0.22 0.00 - 0.51 K/uL    Baso (Absolute) 0.06 0.00 - 0.12 K/uL    Immature Granulocytes (abs) 0.03 0.00 - 0.11 K/uL    NRBC (Absolute) 0.00 K/uL   COMP METABOLIC PANEL   Result Value Ref Range    Sodium 141 135 - 145 mmol/L    Potassium 3.8 3.6 - 5.5 mmol/L    Chloride 106 96 - 112 mmol/L    Co2 25 20 - 33 mmol/L    Anion Gap 10.0 7.0 - 16.0    Glucose 105 (H) 65 - 99 mg/dL    Bun 12 8 - 22 mg/dL    Creatinine 0.71 0.50 - 1.40 mg/dL     Calcium 9.2 8.4 - 10.2 mg/dL    AST(SGOT) 15 12 - 45 U/L    ALT(SGPT) 10 2 - 50 U/L    Alkaline Phosphatase 90 30 - 99 U/L    Total Bilirubin 0.8 0.1 - 1.5 mg/dL    Albumin 4.5 3.2 - 4.9 g/dL    Total Protein 7.1 6.0 - 8.2 g/dL    Globulin 2.6 1.9 - 3.5 g/dL    A-G Ratio 1.7 g/dL   LIPASE   Result Value Ref Range    Lipase 36 7 - 58 U/L   URINE MICROSCOPIC (W/UA)   Result Value Ref Range    WBC 2-5 /hpf    RBC 2-5 (A) /hpf    Bacteria Few (A) None /hpf    Epithelial Cells Many (A) Few /hpf   ESTIMATED GFR   Result Value Ref Range    GFR If African American >60 >60 mL/min/1.73 m 2    GFR If Non African American >60 >60 mL/min/1.73 m 2             COURSE & MEDICAL DECISION MAKING  Nursing notes and vital signs were reviewed. (See chart for details)  The patients prior  records were reviewed, history was obtained from the patient,     The patient presents with flank pain, and the differential diagnosis includes but is not limited to ureteral stone, AAA, pyelonephritis, or associated musculoskeletal back pain .       Initial orders in the Emergency Department included CBC CMP urinalysis and CT renal colic and initial treatment in the Emergency Department included IV Toradol and the patient received IV normal saline hydration.    ED testing reveals moderate occult blood with no signs of infection, CT shows nephrolithiasis in the right kidney that is punctate I suspect she has passed a stone at this point.  I do not think she needs further management she feels better with 1 dose of IV Toradol and IV hydration.  She will follow-up with urology [place]      FINAL IMPRESSION  Nephrolithiasis    .        DISPOSITION  Home in stable condition     Patient has had high blood pressure while in the emergency department, felt likely secondary to medical condition. Counseled patient to monitor blood pressure at home and follow up with primary care physician.      DISPOSITION:  Patient will be discharged home in stable  condition.    FOLLOW UP:  Ji Salinas M.D.  [address]  [facility] [address]  [phone]            OUTPATIENT MEDICATIONS:  New Prescriptions    No medications on file         The patient was discharged home with an information sheet on nephrolithiasis and told to return immediately for any signs or symptoms listed, but specifically if intractable pain, or any worsening at all.  The patient verbally agreed to the discharge precautions and follow-up plan which is documented in EPIC.    Electronically signed by: Theresa Rivera M.D., 11/10/2021 12:06 PM

## 2021-11-10 NOTE — ED TRIAGE NOTES
"Chief Complaint   Patient presents with   • Nephrolithiasis     c/o L flank pain that started friday, c/o pain w/ urination; hx pf kidney stones     /77   Pulse (!) 101   Temp 36.7 °C (98 °F) (Temporal)   Resp 20   Ht 1.702 m (5' 7\")   Wt 62.1 kg (136 lb 14.5 oz)   LMP 09/27/2008   SpO2 97%   BMI 21.44 kg/m²     Pt arrived w/ above concern    Has this patient been vaccinated for COVID - NO  If not, would they like to be vaccinated while in the ER if eligible?  no  Would the patient like to speak with the ERP about the possibility of receiving the COVID vaccine today before making a decision? No     "

## 2021-11-23 ENCOUNTER — TELEMEDICINE (OUTPATIENT)
Dept: MEDICAL GROUP | Age: 51
End: 2021-11-23
Payer: COMMERCIAL

## 2021-11-23 VITALS — BODY MASS INDEX: 21.19 KG/M2 | WEIGHT: 135 LBS | TEMPERATURE: 97.5 F | HEIGHT: 67 IN

## 2021-11-23 DIAGNOSIS — Z12.31 ENCOUNTER FOR SCREENING MAMMOGRAM FOR MALIGNANT NEOPLASM OF BREAST: ICD-10-CM

## 2021-11-23 DIAGNOSIS — F51.01 PRIMARY INSOMNIA: ICD-10-CM

## 2021-11-23 DIAGNOSIS — Z72.0 TOBACCO USE: ICD-10-CM

## 2021-11-23 DIAGNOSIS — F41.1 GAD (GENERALIZED ANXIETY DISORDER): ICD-10-CM

## 2021-11-23 DIAGNOSIS — F43.9 STRESS: ICD-10-CM

## 2021-11-23 DIAGNOSIS — Z86.16 HISTORY OF COVID-19: ICD-10-CM

## 2021-11-23 DIAGNOSIS — F32.1 MODERATE MAJOR DEPRESSION (HCC): ICD-10-CM

## 2021-11-23 DIAGNOSIS — Z20.822 EXPOSURE TO COVID-19 VIRUS: ICD-10-CM

## 2021-11-23 PROCEDURE — 99214 OFFICE O/P EST MOD 30 MIN: CPT | Mod: 95 | Performed by: INTERNAL MEDICINE

## 2021-11-23 RX ORDER — PAROXETINE 10 MG/1
10 TABLET, FILM COATED ORAL DAILY
Qty: 90 TABLET | Refills: 1 | Status: SHIPPED | OUTPATIENT
Start: 2021-11-23 | End: 2022-03-15 | Stop reason: SDUPTHER

## 2021-11-23 RX ORDER — TRAZODONE HYDROCHLORIDE 50 MG/1
50 TABLET ORAL NIGHTLY PRN
Qty: 30 TABLET | Refills: 3 | Status: SHIPPED | OUTPATIENT
Start: 2021-11-23 | End: 2022-10-25

## 2021-11-23 RX ORDER — ALPRAZOLAM 0.5 MG/1
0.5 TABLET ORAL NIGHTLY PRN
Qty: 30 TABLET | Refills: 2 | Status: SHIPPED | OUTPATIENT
Start: 2021-11-23 | End: 2021-12-23

## 2021-11-23 ASSESSMENT — FIBROSIS 4 INDEX: FIB4 SCORE: 1.16
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
## 2022-01-06 ENCOUNTER — HOSPITAL ENCOUNTER (OUTPATIENT)
Facility: MEDICAL CENTER | Age: 52
End: 2022-01-06
Attending: NURSE PRACTITIONER
Payer: COMMERCIAL

## 2022-01-06 ENCOUNTER — OFFICE VISIT (OUTPATIENT)
Dept: URGENT CARE | Facility: CLINIC | Age: 52
End: 2022-01-06
Payer: COMMERCIAL

## 2022-01-06 VITALS
HEART RATE: 99 BPM | RESPIRATION RATE: 16 BRPM | SYSTOLIC BLOOD PRESSURE: 120 MMHG | TEMPERATURE: 101.1 F | HEIGHT: 67 IN | BODY MASS INDEX: 20.88 KG/M2 | DIASTOLIC BLOOD PRESSURE: 70 MMHG | WEIGHT: 133 LBS | OXYGEN SATURATION: 95 %

## 2022-01-06 DIAGNOSIS — R50.9 FEVER, UNSPECIFIED FEVER CAUSE: ICD-10-CM

## 2022-01-06 DIAGNOSIS — J02.9 PHARYNGITIS, UNSPECIFIED ETIOLOGY: ICD-10-CM

## 2022-01-06 LAB
FLUAV+FLUBV AG SPEC QL IA: NEGATIVE
INT CON NEG: NEGATIVE
INT CON NEG: NEGATIVE
INT CON POS: POSITIVE
INT CON POS: POSITIVE
S PYO AG THROAT QL: NEGATIVE

## 2022-01-06 PROCEDURE — 99213 OFFICE O/P EST LOW 20 MIN: CPT | Performed by: NURSE PRACTITIONER

## 2022-01-06 PROCEDURE — 87880 STREP A ASSAY W/OPTIC: CPT | Performed by: NURSE PRACTITIONER

## 2022-01-06 PROCEDURE — 87804 INFLUENZA ASSAY W/OPTIC: CPT | Performed by: NURSE PRACTITIONER

## 2022-01-06 PROCEDURE — U0003 INFECTIOUS AGENT DETECTION BY NUCLEIC ACID (DNA OR RNA); SEVERE ACUTE RESPIRATORY SYNDROME CORONAVIRUS 2 (SARS-COV-2) (CORONAVIRUS DISEASE [COVID-19]), AMPLIFIED PROBE TECHNIQUE, MAKING USE OF HIGH THROUGHPUT TECHNOLOGIES AS DESCRIBED BY CMS-2020-01-R: HCPCS

## 2022-01-06 RX ORDER — ACETAMINOPHEN 500 MG
1000 TABLET ORAL ONCE
Status: COMPLETED | OUTPATIENT
Start: 2022-01-06 | End: 2022-01-06

## 2022-01-06 RX ADMIN — Medication 1000 MG: at 17:37

## 2022-01-06 ASSESSMENT — ENCOUNTER SYMPTOMS
NAUSEA: 1
VOMITING: 0
HEMOPTYSIS: 0
HEADACHES: 1
SPUTUM PRODUCTION: 0
CHILLS: 1
COUGH: 0
WHEEZING: 0
FEVER: 1
SORE THROAT: 1
SHORTNESS OF BREATH: 1
DIARRHEA: 0

## 2022-01-06 ASSESSMENT — FIBROSIS 4 INDEX: FIB4 SCORE: 1.18

## 2022-01-06 NOTE — LETTER
January 6, 2022         Patient: Rachel Jacobs   YOB: 1970   Date of Visit: 1/6/2022     To Whom it May Concern:    Rachel Jacobs was seen in my clinic on 1/6/2022.     A concern for COVID-19 has been identified and testing is in progress. The results will be available through our electronic delivery system called [employer].  We are asking employers to excuse absences while they follow self-isolation protocol per CDC guidelines.    If results are positive:  • Stay home for 5 days.  •If you have no symptoms or your symptoms are resolving after 5 days, you can leave your house.  •Continue to wear a mask around others for 5 additional days.  •If you have a fever, continue to stay home until your fever resolves.     If results are negative, you can end isolation if symptoms have improved and you have not had a fever in the last 24 hours. Unless you were exposed to someone with COVID-19, then the following applies:  • If you have been vaccinated in the last 6 month or have the booster: Wear a mask around others for 10 days, and test on day 5, if possible. If you develop symptoms, get tested and stay home.  • If you have not been vaccinated, or vaccines were over 6 months ago: Stay home for 5 days, following guidelines for those that test positive, and continue to wear a mask for another 5 days. Get tested on day 5. If you develop symptoms, get tested and stay home.    Please schedule a visit with a primary care provider if documents for FMLA, disability, or unemployment are required.      If you have any questions or concerns, please don't hesitate to call.      Sincerely,     DINORAH Dumont.  Electronically Signed

## 2022-01-07 ENCOUNTER — HOSPITAL ENCOUNTER (EMERGENCY)
Facility: MEDICAL CENTER | Age: 52
End: 2022-01-07
Attending: EMERGENCY MEDICINE
Payer: COMMERCIAL

## 2022-01-07 ENCOUNTER — APPOINTMENT (OUTPATIENT)
Dept: RADIOLOGY | Facility: MEDICAL CENTER | Age: 52
End: 2022-01-07
Payer: COMMERCIAL

## 2022-01-07 VITALS
WEIGHT: 135.14 LBS | HEART RATE: 82 BPM | BODY MASS INDEX: 21.21 KG/M2 | RESPIRATION RATE: 18 BRPM | HEIGHT: 67 IN | SYSTOLIC BLOOD PRESSURE: 114 MMHG | OXYGEN SATURATION: 96 % | DIASTOLIC BLOOD PRESSURE: 62 MMHG | TEMPERATURE: 99.9 F

## 2022-01-07 DIAGNOSIS — E86.0 DEHYDRATION: ICD-10-CM

## 2022-01-07 DIAGNOSIS — J04.0 LARYNGITIS: ICD-10-CM

## 2022-01-07 DIAGNOSIS — J22 LOWER RESP. TRACT INFECTION: ICD-10-CM

## 2022-01-07 DIAGNOSIS — J02.9 PHARYNGITIS, UNSPECIFIED ETIOLOGY: ICD-10-CM

## 2022-01-07 LAB
ALBUMIN SERPL BCP-MCNC: 4.5 G/DL (ref 3.2–4.9)
ALBUMIN/GLOB SERPL: 1.4 G/DL
ALP SERPL-CCNC: 89 U/L (ref 30–99)
ALT SERPL-CCNC: 9 U/L (ref 2–50)
ANION GAP SERPL CALC-SCNC: 14 MMOL/L (ref 7–16)
APPEARANCE UR: CLEAR
AST SERPL-CCNC: 14 U/L (ref 12–45)
BASOPHILS # BLD AUTO: 0.6 % (ref 0–1.8)
BASOPHILS # BLD: 0.08 K/UL (ref 0–0.12)
BILIRUB SERPL-MCNC: 1 MG/DL (ref 0.1–1.5)
BILIRUB UR QL STRIP.AUTO: ABNORMAL
BUN SERPL-MCNC: 11 MG/DL (ref 8–22)
CALCIUM SERPL-MCNC: 9.2 MG/DL (ref 8.4–10.2)
CHLORIDE SERPL-SCNC: 101 MMOL/L (ref 96–112)
CO2 SERPL-SCNC: 21 MMOL/L (ref 20–33)
COLOR UR: YELLOW
CREAT SERPL-MCNC: 0.72 MG/DL (ref 0.5–1.4)
EKG IMPRESSION: NORMAL
EOSINOPHIL # BLD AUTO: 0.11 K/UL (ref 0–0.51)
EOSINOPHIL NFR BLD: 0.8 % (ref 0–6.9)
EPI CELLS #/AREA URNS HPF: ABNORMAL /HPF
ERYTHROCYTE [DISTWIDTH] IN BLOOD BY AUTOMATED COUNT: 39.9 FL (ref 35.9–50)
GLOBULIN SER CALC-MCNC: 3.2 G/DL (ref 1.9–3.5)
GLUCOSE SERPL-MCNC: 119 MG/DL (ref 65–99)
GLUCOSE UR STRIP.AUTO-MCNC: NEGATIVE MG/DL
HCT VFR BLD AUTO: 45 % (ref 37–47)
HGB BLD-MCNC: 15.5 G/DL (ref 12–16)
IMM GRANULOCYTES # BLD AUTO: 0.06 K/UL (ref 0–0.11)
IMM GRANULOCYTES NFR BLD AUTO: 0.4 % (ref 0–0.9)
KETONES UR STRIP.AUTO-MCNC: 15 MG/DL
LACTATE BLD-SCNC: 1.4 MMOL/L (ref 0.5–2)
LEUKOCYTE ESTERASE UR QL STRIP.AUTO: NEGATIVE
LYMPHOCYTES # BLD AUTO: 1.61 K/UL (ref 1–4.8)
LYMPHOCYTES NFR BLD: 11.4 % (ref 22–41)
MCH RBC QN AUTO: 33 PG (ref 27–33)
MCHC RBC AUTO-ENTMCNC: 34.4 G/DL (ref 33.6–35)
MCV RBC AUTO: 95.9 FL (ref 81.4–97.8)
MICRO URNS: ABNORMAL
MONOCYTES # BLD AUTO: 1.13 K/UL (ref 0–0.85)
MONOCYTES NFR BLD AUTO: 8 % (ref 0–13.4)
MUCOUS THREADS #/AREA URNS HPF: ABNORMAL /HPF
NEUTROPHILS # BLD AUTO: 11.09 K/UL (ref 2–7.15)
NEUTROPHILS NFR BLD: 78.8 % (ref 44–72)
NITRITE UR QL STRIP.AUTO: NEGATIVE
NRBC # BLD AUTO: 0 K/UL
NRBC BLD-RTO: 0 /100 WBC
PH UR STRIP.AUTO: 6 [PH] (ref 5–8)
PLATELET # BLD AUTO: 210 K/UL (ref 164–446)
PMV BLD AUTO: 10.5 FL (ref 9–12.9)
POTASSIUM SERPL-SCNC: 3.9 MMOL/L (ref 3.6–5.5)
PROT SERPL-MCNC: 7.7 G/DL (ref 6–8.2)
PROT UR QL STRIP: NEGATIVE MG/DL
RBC # BLD AUTO: 4.69 M/UL (ref 4.2–5.4)
RBC # URNS HPF: ABNORMAL /HPF
RBC UR QL AUTO: ABNORMAL
SODIUM SERPL-SCNC: 136 MMOL/L (ref 135–145)
SP GR UR STRIP.AUTO: 1.02
WBC # BLD AUTO: 14.1 K/UL (ref 4.8–10.8)
WBC #/AREA URNS HPF: ABNORMAL /HPF

## 2022-01-07 PROCEDURE — 36415 COLL VENOUS BLD VENIPUNCTURE: CPT

## 2022-01-07 PROCEDURE — 96374 THER/PROPH/DIAG INJ IV PUSH: CPT

## 2022-01-07 PROCEDURE — 85025 COMPLETE CBC W/AUTO DIFF WBC: CPT

## 2022-01-07 PROCEDURE — 99284 EMERGENCY DEPT VISIT MOD MDM: CPT

## 2022-01-07 PROCEDURE — 83605 ASSAY OF LACTIC ACID: CPT

## 2022-01-07 PROCEDURE — U0005 INFEC AGEN DETEC AMPLI PROBE: HCPCS

## 2022-01-07 PROCEDURE — 87040 BLOOD CULTURE FOR BACTERIA: CPT

## 2022-01-07 PROCEDURE — 87086 URINE CULTURE/COLONY COUNT: CPT

## 2022-01-07 PROCEDURE — 700105 HCHG RX REV CODE 258: Performed by: EMERGENCY MEDICINE

## 2022-01-07 PROCEDURE — A9270 NON-COVERED ITEM OR SERVICE: HCPCS | Performed by: EMERGENCY MEDICINE

## 2022-01-07 PROCEDURE — 80053 COMPREHEN METABOLIC PANEL: CPT

## 2022-01-07 PROCEDURE — 81001 URINALYSIS AUTO W/SCOPE: CPT

## 2022-01-07 PROCEDURE — 700102 HCHG RX REV CODE 250 W/ 637 OVERRIDE(OP): Performed by: EMERGENCY MEDICINE

## 2022-01-07 PROCEDURE — U0003 INFECTIOUS AGENT DETECTION BY NUCLEIC ACID (DNA OR RNA); SEVERE ACUTE RESPIRATORY SYNDROME CORONAVIRUS 2 (SARS-COV-2) (CORONAVIRUS DISEASE [COVID-19]), AMPLIFIED PROBE TECHNIQUE, MAKING USE OF HIGH THROUGHPUT TECHNOLOGIES AS DESCRIBED BY CMS-2020-01-R: HCPCS

## 2022-01-07 PROCEDURE — 93005 ELECTROCARDIOGRAM TRACING: CPT

## 2022-01-07 PROCEDURE — 96375 TX/PRO/DX INJ NEW DRUG ADDON: CPT

## 2022-01-07 PROCEDURE — 93005 ELECTROCARDIOGRAM TRACING: CPT | Performed by: EMERGENCY MEDICINE

## 2022-01-07 PROCEDURE — 700111 HCHG RX REV CODE 636 W/ 250 OVERRIDE (IP): Performed by: EMERGENCY MEDICINE

## 2022-01-07 PROCEDURE — 71045 X-RAY EXAM CHEST 1 VIEW: CPT

## 2022-01-07 RX ORDER — ALPRAZOLAM 0.5 MG/1
0.5 TABLET ORAL
Status: SHIPPED | COMMUNITY
End: 2022-10-25

## 2022-01-07 RX ORDER — DEXAMETHASONE SODIUM PHOSPHATE 10 MG/ML
10 INJECTION, SOLUTION INTRAMUSCULAR; INTRAVENOUS ONCE
Status: DISCONTINUED | OUTPATIENT
Start: 2022-01-07 | End: 2022-01-07

## 2022-01-07 RX ORDER — ACETAMINOPHEN 325 MG/1
650 TABLET ORAL ONCE
Status: COMPLETED | OUTPATIENT
Start: 2022-01-07 | End: 2022-01-07

## 2022-01-07 RX ORDER — ACETAMINOPHEN 500 MG
500-1000 TABLET ORAL EVERY 6 HOURS PRN
Status: SHIPPED | COMMUNITY
End: 2022-10-25

## 2022-01-07 RX ORDER — HYDROCODONE BITARTRATE AND ACETAMINOPHEN 5; 325 MG/1; MG/1
1 TABLET ORAL EVERY 8 HOURS PRN
Qty: 6 TABLET | Refills: 0 | Status: SHIPPED | OUTPATIENT
Start: 2022-01-07 | End: 2022-01-09

## 2022-01-07 RX ORDER — SODIUM CHLORIDE, SODIUM LACTATE, POTASSIUM CHLORIDE, CALCIUM CHLORIDE 600; 310; 30; 20 MG/100ML; MG/100ML; MG/100ML; MG/100ML
1000 INJECTION, SOLUTION INTRAVENOUS ONCE
Status: COMPLETED | OUTPATIENT
Start: 2022-01-07 | End: 2022-01-07

## 2022-01-07 RX ORDER — DEXAMETHASONE SODIUM PHOSPHATE 10 MG/ML
10 INJECTION, SOLUTION INTRAMUSCULAR; INTRAVENOUS ONCE
Status: COMPLETED | OUTPATIENT
Start: 2022-01-07 | End: 2022-01-07

## 2022-01-07 RX ORDER — DEXAMETHASONE 4 MG/1
8 TABLET ORAL ONCE
Qty: 2 TABLET | Refills: 0 | Status: SHIPPED | OUTPATIENT
Start: 2022-01-07 | End: 2022-01-07

## 2022-01-07 RX ORDER — DOXYCYCLINE 100 MG/1
100 CAPSULE ORAL 2 TIMES DAILY
Qty: 10 CAPSULE | Refills: 0 | Status: SHIPPED | OUTPATIENT
Start: 2022-01-07 | End: 2022-01-12

## 2022-01-07 RX ORDER — KETOROLAC TROMETHAMINE 30 MG/ML
30 INJECTION, SOLUTION INTRAMUSCULAR; INTRAVENOUS ONCE
Status: COMPLETED | OUTPATIENT
Start: 2022-01-07 | End: 2022-01-07

## 2022-01-07 RX ADMIN — KETOROLAC TROMETHAMINE 30 MG: 30 INJECTION, SOLUTION INTRAMUSCULAR; INTRAVENOUS at 10:57

## 2022-01-07 RX ADMIN — DEXAMETHASONE SODIUM PHOSPHATE 10 MG: 10 INJECTION INTRAMUSCULAR; INTRAVENOUS at 10:46

## 2022-01-07 RX ADMIN — ACETAMINOPHEN 650 MG: 325 TABLET, FILM COATED ORAL at 10:46

## 2022-01-07 RX ADMIN — SODIUM CHLORIDE, POTASSIUM CHLORIDE, SODIUM LACTATE AND CALCIUM CHLORIDE 1000 ML: 600; 310; 30; 20 INJECTION, SOLUTION INTRAVENOUS at 10:59

## 2022-01-07 ASSESSMENT — ENCOUNTER SYMPTOMS
FEVER: 1
COUGH: 1
WHEEZING: 0
SORE THROAT: 1
CHILLS: 1

## 2022-01-07 ASSESSMENT — FIBROSIS 4 INDEX: FIB4 SCORE: 1.18

## 2022-01-07 NOTE — ED TRIAGE NOTES
"Chief Complaint   Patient presents with   • Coronavirus Screening     Pt c/o cough, congestion Tuesday, states feels worse today     /64   Pulse 80   Temp (!) 38.6 °C (101.5 °F) (Temporal)   Resp (!) 24   Ht 1.702 m (5' 7\")   Wt 61.3 kg (135 lb 2.3 oz)   LMP 09/27/2008   SpO2 97%   BMI 21.17 kg/m²     Has this patient been vaccinated for COVID No  If not, would they like to be vaccinated while in the ER if eligible?  NO  Would the patient like to speak with the ERP about the possibility of receiving the COVID vaccine today before making a decision? NO    Pt noted to have strong, congested bronchial cough, states went to [facility] yesterday and tested (-) for Covid, states feels worse over the last 12 hours.      "

## 2022-01-07 NOTE — ED NOTES
Pt in hallway sobbing because she can't find her room. Identify verified and taken to room 6. Urine specimen obtained and on bedside table, provided gown and warm blanket.

## 2022-01-07 NOTE — Clinical Note
Rachel Jacobs was seen and treated in our emergency department on 1/7/2022.  She may return to work on 01/11/2022.       If you have any questions or concerns, please don't hesitate to call.      Annabelle Guillaume D.O.

## 2022-01-07 NOTE — ED NOTES
Pt given d/c paperwork and RX p/u information, pt verbalized understanding all information given; pt ambulated out of the ER w/o difficulty

## 2022-01-07 NOTE — PROGRESS NOTES
Subjective:     Rachel Jacobs is a 52 y.o. female who presents for Coronavirus Screening (Sx's 2 days ago, headaches, body aches, sore throat, fever, loss of appetite. Mild nausea, chest pain, congestion.)      Symptoms started 2 days ago. 101.7 fever. Hoarse voice. Room mate was sick 2 weeks ago. Is not vaccinated. No hx of COVID. Taking Mucinex andTylenol.     Pharyngitis   This is a new problem. The current episode started in the past 7 days. The problem has been gradually worsening. Neither side of throat is experiencing more pain than the other. The maximum temperature recorded prior to her arrival was 101 - 101.9 F. The pain is severe. Associated symptoms include congestion, ear pain, headaches and shortness of breath. Pertinent negatives include no coughing, diarrhea, drooling or vomiting. The treatment provided mild relief.       Past Medical History:   Diagnosis Date   • Depression    • Endometriosis    • Macrocytosis 3/2014   • Nephrolithiasis        Past Surgical History:   Procedure Laterality Date   • DESTRUCTION OF ANAL WARTS  9/27/2019    Procedure: EXCISION, CONDYLOMA, PERIANAL AND FULGURATION;  Surgeon: Olegario Lim M.D.;  Location: [facility];  Service: General   • CYSTOSCOPY Left 5/25/2018    Procedure: CYSTOSCOPY with STENT placement;  Surgeon: Blaise Dominguez M.D.;  Location: [facility];  Service: Urology   • URETEROSCOPY Left 5/25/2018    Procedure: URETEROSCOPY;  Surgeon: Blaise Dominguez M.D.;  Location: [facility];  Service: Urology   • ABDOMINAL HYSTERECTOMY TOTAL      2008  still has ovaries (pt believes)   • MENISCUS REPAIR         Social History     Socioeconomic History   • Marital status:      Spouse name: Not on file   • Number of children: Not on file   • Years of education: Not on file   • Highest education level: Not on file   Occupational History   • Not on file   Tobacco Use   • Smoking status: Current Every Day  Smoker     Packs/day: 0.10     Types: Cigarettes   • Smokeless tobacco: Never Used   Vaping Use   • Vaping Use: Every day   • Substances: Nicotine, Flavoring   Substance and Sexual Activity   • Alcohol use: Yes     Alcohol/week: 0.6 oz     Types: 1 Standard drinks or equivalent per week   • Drug use: Yes     Types: Marijuana     Comment: Marijuana   • Sexual activity: Yes     Partners: Male     Birth control/protection: Surgical   Other Topics Concern   • Not on file   Social History Narrative    Lives with boyfriend.     Children: 1    Work: office     Social Determinants of Health     Financial Resource Strain:    • Difficulty of Paying Living Expenses: Not on file   Food Insecurity:    • Worried About Running Out of Food in the Last Year: Not on file   • Ran Out of Food in the Last Year: Not on file   Transportation Needs:    • Lack of Transportation (Medical): Not on file   • Lack of Transportation (Non-Medical): Not on file   Physical Activity:    • Days of Exercise per Week: Not on file   • Minutes of Exercise per Session: Not on file   Stress:    • Feeling of Stress : Not on file   Social Connections:    • Frequency of Communication with Friends and Family: Not on file   • Frequency of Social Gatherings with Friends and Family: Not on file   • Attends Confucianism Services: Not on file   • Active Member of Clubs or Organizations: Not on file   • Attends Club or Organization Meetings: Not on file   • Marital Status: Not on file   Intimate Partner Violence:    • Fear of Current or Ex-Partner: Not on file   • Emotionally Abused: Not on file   • Physically Abused: Not on file   • Sexually Abused: Not on file   Housing Stability:    • Unable to Pay for Housing in the Last Year: Not on file   • Number of Places Lived in the Last Year: Not on file   • Unstable Housing in the Last Year: Not on file        Family History   Problem Relation Age of Onset   • Diabetes Mother    • Allergies Mother    • Hypertension Mother   "  • Hyperlipidemia Mother    • Other Father         unknown   • Genitourinary () Problems Father    • Heart Disease Neg Hx    • Psychiatric Illness Neg Hx    • Stroke Neg Hx    • Thyroid Neg Hx         Allergies   Allergen Reactions   • Pcn [Penicillins] Shortness of Breath     \"SOB, increase Heart beat\"       Review of Systems   Constitutional: Positive for chills, fever and malaise/fatigue.   HENT: Positive for congestion, ear pain and sore throat. Negative for drooling.    Respiratory: Positive for shortness of breath. Negative for cough, hemoptysis, sputum production and wheezing.    Gastrointestinal: Positive for nausea. Negative for diarrhea and vomiting.   Neurological: Positive for headaches.   All other systems reviewed and are negative.       Objective:   /70 (BP Location: Left arm, Patient Position: Sitting, BP Cuff Size: Adult)   Pulse 99   Temp (!) 38.4 °C (101.1 °F) (Temporal)   Resp 16   Ht 1.702 m (5' 7\")   Wt 60.3 kg (133 lb)   LMP 09/27/2008   SpO2 95%   BMI 20.83 kg/m²     Physical Exam  Vitals reviewed.   Constitutional:       General: She is not in acute distress.     Appearance: She is well-developed. She is ill-appearing.   HENT:      Head: Normocephalic and atraumatic.      Right Ear: Ear canal and external ear normal. No drainage, swelling or tenderness. A middle ear effusion is present. No mastoid tenderness. No hemotympanum. Tympanic membrane is not injected, perforated or erythematous.      Left Ear: Ear canal and external ear normal. No drainage, swelling or tenderness. A middle ear effusion is present. No mastoid tenderness. No hemotympanum. Tympanic membrane is not injected, perforated or erythematous.      Ears:      Comments: Clear effusion/.     Nose: Rhinorrhea present.      Mouth/Throat:      Mouth: Mucous membranes are moist.      Pharynx: Uvula midline. Posterior oropharyngeal erythema present.      Tonsils: No tonsillar exudate or tonsillar abscesses. 1+ on the " right. 1+ on the left.      Comments: Hoarse voice.   Eyes:      Conjunctiva/sclera: Conjunctivae normal.   Cardiovascular:      Rate and Rhythm: Normal rate.   Pulmonary:      Effort: Pulmonary effort is normal. No respiratory distress.      Breath sounds: Normal breath sounds. No stridor. No wheezing, rhonchi or rales.   Musculoskeletal:         General: Normal range of motion.      Cervical back: Normal range of motion and neck supple. Tenderness present. No rigidity.   Skin:     General: Skin is warm and dry.      Findings: No rash.   Neurological:      General: No focal deficit present.      Mental Status: She is alert and oriented to person, place, and time.      GCS: GCS eye subscore is 4. GCS verbal subscore is 5. GCS motor subscore is 6.   Psychiatric:         Mood and Affect: Mood normal.         Speech: Speech normal.         Behavior: Behavior normal.         Thought Content: Thought content normal.         Judgment: Judgment normal.         Assessment/Plan:   1. Pharyngitis, unspecified etiology  - POCT Rapid Strep A: Negative  - SARS-CoV-2 PCR (24 hour In-House): Collect NP swab in VTM; Future  - POCT Influenza A/B: Negative  - acetaminophen (TYLENOL) tablet 1,000 mg    2. Fever, unspecified fever cause  - acetaminophen (TYLENOL) tablet 1,000 mg    Results for orders placed or performed during the hospital encounter of 01/06/22   SARS-CoV-2 PCR (24 hour In-House): Collect NP swab in VTM    Specimen: Respirate   Result Value Ref Range    SARS-CoV-2 Source NP Swab     SARS-CoV-2 by PCR NotDetected    COVID/SARS CoV-2 PCR   Result Value Ref Range    COVID Order Status Received    Symptomatic care.  -Oral hydration and rest.   -Cough control: nonpharmacologic options for cough relief such as throat lozenges, hot tea, honey.  -Over the counter expectorant as directed; Guaifenesin (Mucinex).  -Tylenol or ibuprofen for pain and fever as directed.   -Warm salt water gargles.  -OTC Throat lozenges or spray  (Cepacol).    Seek emergency medical care immediately for: Trouble breathing, persistent pain or pressure in the chest, confusion, inability to wake or stay awake, bluish lips or face, persistent tachycardia (fast heart rate), prolonged dizziness, persistent high grade fevers. Follow up for prolonged cough, persistent wheezing, persistent throat pain, increased swelling, persistent fevers, difficulty swallowing, leg swelling, or any other concerns. Follow up with PCP.     Discussed viral etiology. COVID S&S, and self isolation guidelines. S&S of PNA with follow up.     Differential diagnosis, natural history, supportive care, and indications for immediate follow-up discussed.

## 2022-01-07 NOTE — ED PROVIDER NOTES
ED Provider Note    ED Provider Note    Primary care provider: Juliano Negron M.D.  Means of arrival: POV  History obtained from: patient  History limited by: None    CHIEF COMPLAINT  Chief Complaint   Patient presents with   • Coronavirus Screening     Pt c/o cough, congestion Tuesday, states feels worse today       HPI  Rachel Jacobs is a 52 y.o. female who presents to the Emergency Department with a chief complaint of cough, congestion and a fever.  Patient states it started on Tuesday, she was asymptomatic on Monday.  It is gradually worsened.  She was seen in the urgent care last night had a strep, flu and Covid swab.  She does not know the results of her Covid swab but states that her flu and strep testing were negative.  She has a very sore throat, harsh cough and she is losing her voice.  She had an episode of nausea vomiting this morning but that was the only episode.  She is unvaccinated against Covid, per personal preference.  She states she has no past medical history.    REVIEW OF SYSTEMS  Review of Systems   Constitutional: Positive for chills, fever and malaise/fatigue.   HENT: Positive for congestion and sore throat.    Respiratory: Positive for cough. Negative for wheezing.    Cardiovascular: Negative for chest pain.     PAST MEDICAL HISTORY   has a past medical history of Depression, Endometriosis, Macrocytosis (3/2014), and Nephrolithiasis.    SURGICAL HISTORY   has a past surgical history that includes meniscus repair; cystoscopy (Left, 5/25/2018); ureteroscopy (Left, 5/25/2018); abdominal hysterectomy total; and destruction of anal warts (9/27/2019).    SOCIAL HISTORY  Social History     Tobacco Use   • Smoking status: Current Every Day Smoker     Packs/day: 0.10     Types: Cigarettes   • Smokeless tobacco: Never Used   Vaping Use   • Vaping Use: Every day   • Substances: Nicotine, Flavoring   Substance Use Topics   • Alcohol use: Yes     Alcohol/week: 0.6 oz     Types: 1 Standard  "drinks or equivalent per week   • Drug use: Yes     Types: Marijuana     Comment: Marijuana      Social History     Substance and Sexual Activity   Drug Use Yes   • Types: Marijuana    Comment: Marijuana       FAMILY HISTORY  Family History   Problem Relation Age of Onset   • Diabetes Mother    • Allergies Mother    • Hypertension Mother    • Hyperlipidemia Mother    • Other Father         unknown   • Genitourinary () Problems Father    • Heart Disease Neg Hx    • Psychiatric Illness Neg Hx    • Stroke Neg Hx    • Thyroid Neg Hx        CURRENT MEDICATIONS  Home Medications     Reviewed by Andry Wolfe (Pharmacy Tech) on 01/07/22 at 1042  Med List Status: Complete   Medication Last Dose Status   acetaminophen (TYLENOL) 500 MG Tab 1/6/2022 Active   ALPRAZolam (XANAX) 0.5 MG Tab 1/5/2022 Active   PARoxetine (PAXIL) 10 MG Tab 1/6/2022 Active   traZODone (DESYREL) 50 MG Tab 1/5/2022 Active                ALLERGIES  Allergies   Allergen Reactions   • Pcn [Penicillins] Shortness of Breath     \"SOB, increase Heart beat\"       PHYSICAL EXAM  VITAL SIGNS: /64   Pulse 80   Temp 37.9 °C (100.3 °F) (Temporal)   Resp (!) 24   Ht 1.702 m (5' 7\")   Wt 61.3 kg (135 lb 2.3 oz)   LMP 09/27/2008   SpO2 97%   BMI 21.17 kg/m²   Vitals reviewed.  Constitutional: Patient is oriented to person, place, and time. Appears well-developed and well-nourished. Mild distress.    Head: Normocephalic and atraumatic.  Mouth/Throat: Oropharynx is clear and moist, no exudates with diffuse erythema no significant swelling.   Eyes: Conjunctivae are normal. Pupils are equal, round, and reactive to light.   Neck: Normal range of motion. Neck supple.  Cardiovascular: Normal rate, regular rhythm and normal heart sounds.   Pulmonary/Chest: Effort normal and breath sounds normal. No respiratory distress, no wheezes, rhonchi, or rales.   Abdominal: Soft. Bowel sounds are normal. There is no tenderness.  Musculoskeletal: No edema. Diffuse " myalgias.  Lymphadenopathy: No cervical adenopathy.   Neurological: No focal deficits.   Skin: Skin is warm and dry. No erythema. No pallor.   Psychiatric: Patient has a normal mood and affect.     LABS  Results for orders placed or performed during the hospital encounter of 01/07/22   CBC WITH DIFFERENTIAL   Result Value Ref Range    WBC 14.1 (H) 4.8 - 10.8 K/uL    RBC 4.69 4.20 - 5.40 M/uL    Hemoglobin 15.5 12.0 - 16.0 g/dL    Hematocrit 45.0 37.0 - 47.0 %    MCV 95.9 81.4 - 97.8 fL    MCH 33.0 27.0 - 33.0 pg    MCHC 34.4 33.6 - 35.0 g/dL    RDW 39.9 35.9 - 50.0 fL    Platelet Count 210 164 - 446 K/uL    MPV 10.5 9.0 - 12.9 fL    Neutrophils-Polys 78.80 (H) 44.00 - 72.00 %    Lymphocytes 11.40 (L) 22.00 - 41.00 %    Monocytes 8.00 0.00 - 13.40 %    Eosinophils 0.80 0.00 - 6.90 %    Basophils 0.60 0.00 - 1.80 %    Immature Granulocytes 0.40 0.00 - 0.90 %    Nucleated RBC 0.00 /100 WBC    Neutrophils (Absolute) 11.09 (H) 2.00 - 7.15 K/uL    Lymphs (Absolute) 1.61 1.00 - 4.80 K/uL    Monos (Absolute) 1.13 (H) 0.00 - 0.85 K/uL    Eos (Absolute) 0.11 0.00 - 0.51 K/uL    Baso (Absolute) 0.08 0.00 - 0.12 K/uL    Immature Granulocytes (abs) 0.06 0.00 - 0.11 K/uL    NRBC (Absolute) 0.00 K/uL   COMP METABOLIC PANEL   Result Value Ref Range    Sodium 136 135 - 145 mmol/L    Potassium 3.9 3.6 - 5.5 mmol/L    Chloride 101 96 - 112 mmol/L    Co2 21 20 - 33 mmol/L    Anion Gap 14.0 7.0 - 16.0    Glucose 119 (H) 65 - 99 mg/dL    Bun 11 8 - 22 mg/dL    Creatinine 0.72 0.50 - 1.40 mg/dL    Calcium 9.2 8.4 - 10.2 mg/dL    AST(SGOT) 14 12 - 45 U/L    ALT(SGPT) 9 2 - 50 U/L    Alkaline Phosphatase 89 30 - 99 U/L    Total Bilirubin 1.0 0.1 - 1.5 mg/dL    Albumin 4.5 3.2 - 4.9 g/dL    Total Protein 7.7 6.0 - 8.2 g/dL    Globulin 3.2 1.9 - 3.5 g/dL    A-G Ratio 1.4 g/dL   URINALYSIS    Specimen: Blood   Result Value Ref Range    Color Yellow     Character Clear     Specific Gravity 1.025 <1.035    Ph 6.0 5.0 - 8.0    Glucose Negative  Negative mg/dL    Ketones 15 (A) Negative mg/dL    Protein Negative Negative mg/dL    Bilirubin Small (A) Negative    Nitrite Negative Negative    Leukocyte Esterase Negative Negative    Occult Blood Large (A) Negative    Micro Urine Req Microscopic    LACTIC ACID   Result Value Ref Range    Lactic Acid 1.4 0.5 - 2.0 mmol/L   SARS-CoV-2 PCR (24 hour In-House): Collect NP swab in VTM    Specimen: Nasopharyngeal; Respirate   Result Value Ref Range    SARS-CoV-2 Source NP Swab    URINE MICROSCOPIC (W/UA)   Result Value Ref Range    WBC 0-2 /hpf    RBC 2-5 (A) /hpf    Epithelial Cells Few Few /hpf    Mucous Threads Moderate /hpf   ESTIMATED GFR   Result Value Ref Range    GFR If African American >60 >60 mL/min/1.73 m 2    GFR If Non African American >60 >60 mL/min/1.73 m 2   EKG   Result Value Ref Range    Report       [facility] Emergency Dept.    Test Date:  2022  Pt Name:    BRAYDEN PAL               Department: [facility]  MRN:        [id number]                      Room:  Gender:     Female                       Technician: MOLINA  :        [id number]                   Requested By:ER TRIAGE PROTOCOL  Order #:    [id number]                    Reading MD: YUVAL WOOD DO    Measurements  Intervals                                Axis  Rate:       91                           P:          64  UT:         148                          QRS:        70  QRSD:       74                           T:          -87  QT:         384  QTc:        473    Interpretive Statements  SINUS RHYTHM  RSR' IN V1 OR V2, PROBABLY NORMAL VARIANT  BORDERLINE REPOLARIZATION ABNORMALITY  BASELINE WANDER IN LEAD(S) V2,V4,V5,V6  No previous ECG available for comparison  Electronically Signed On 2022 12:57:15 PST by YUVAL WOOD DO         All labs reviewed by me.    EKG Interpretation  Interpreted by me    RADIOLOGY  DX-CHEST-PORTABLE (1 VIEW)   Final Result         1. No acute cardiopulmonary abnormalities are  identified.        The radiologist's interpretation of all radiological studies have been reviewed by me.    COURSE & MEDICAL DECISION MAKING  Pertinent Labs & Imaging studies reviewed. (See chart for details)    Obtained and reviewed past medical records.  Patient was seen in the urgent care yesterday for Covid screening.  Prior to that patient seen in the emergency department in November of last year for nephrolithiasis.    10:40 AM - Patient seen and examined at bedside.  This is a pleasant, 52-year-old female.  She is been sick for 4 days.  Covid testing yesterday which is negative.  She also tested negative for strep and flu.  She continues to have fevers.  She has a harsh cough.  Lungs are clear on my exam.  She is not hypoxic.      1 PM patient's reevaluated at the bedside.  She was able to rest which she has not been able to recently.  IV fluids infused and she is feeling better.  We discussed results including negative Covid test yesterday.  Chest x-ray is reassuring.  She does have an elevated white blood cell count certainly related likely to her URI/lower respiratory infection.  Chemistries unrevealing.  Urine analysis does not show evidence of infection.  Her lactate was normal.  I suspect she would likely benefit from a repeat dose of Decadron in 2 days.  In addition, if she is not improved in the next 2 to 3 days, I have advised treatment with antibiotics.  She is prescribed doxycycline.  She is given a short course of Norco to assist with pain in hopes that she can get better sleep.  Number 6 tablets.   checked.  No concerning prescription filling pattern.  She is aware, she was reswabbed for Covid today and she can refer to my chart for these results.  I have advised her to follow up with her PCP.  To rest and drink plenty of fluids.  She is well-appearing and nontoxic.  To be discharged home in stable condition.  She is given strict return precautions.    FINAL IMPRESSION  1. Laryngitis    2.  Lower resp. tract infection    3. Pharyngitis, unspecified etiology    4. Dehydration

## 2022-01-07 NOTE — ED TRIAGE NOTES
"Chief Complaint   Patient presents with   • Coronavirus Screening     Pt c/o congestion, cough and HA started on Tuesday, states received Booster vaccine last Friday   • Chest Pain     Pt states chest feels \"really tight w/ coughing\"     /64   Pulse 80   Temp (!) 38.6 °C (101.5 °F) (Temporal)   Resp (!) 24   Ht 1.702 m (5' 7\")   Wt 61.3 kg (135 lb 2.3 oz)   LMP 09/27/2008   SpO2 97%   BMI 21.17 kg/m²     Has this patient been vaccinated for COVID YES  If not, would they like to be vaccinated while in the ER if eligible?  na  Would the patient like to speak with the ERP about the possibility of receiving the COVID vaccine today before making a decision? Na      "

## 2022-01-07 NOTE — PATIENT INSTRUCTIONS
Viral Respiratory Infection  A respiratory infection is an illness that affects part of the respiratory system, such as the lungs, nose, or throat. A respiratory infection that is caused by a virus is called a viral respiratory infection.  Common types of viral respiratory infections include:  · A cold.  · The flu (influenza).  · A respiratory syncytial virus (RSV) infection.  What are the causes?  This condition is caused by a virus.  What are the signs or symptoms?  Symptoms of this condition include:  · A stuffy or runny nose.  · Yellow or green nasal discharge.  · A cough.  · Sneezing.  · Fatigue.  · Achy muscles.  · A sore throat.  · Sweating or chills.  · A fever.  · A headache.  How is this diagnosed?  This condition may be diagnosed based on:  · Your symptoms.  · A physical exam.  · Testing of nasal swabs.  How is this treated?  This condition may be treated with medicines, such as:  · Antiviral medicine. This may shorten the length of time a person has symptoms.  · Expectorants. These make it easier to cough up mucus.  · Decongestant nasal sprays.  · Acetaminophen or NSAIDs to relieve fever and pain.  Antibiotic medicines are not prescribed for viral infections. This is because antibiotics are designed to kill bacteria. They are not effective against viruses.  Follow these instructions at home:    Managing pain and congestion  · Take over-the-counter and prescription medicines only as told by your health care provider.  · If you have a sore throat, gargle with a salt-water mixture 3-4 times a day or as needed. To make a salt-water mixture, completely dissolve ½-1 tsp of salt in 1 cup of warm water.  · Use nose drops made from salt water to ease congestion and soften raw skin around your nose.  · Drink enough fluid to keep your urine pale yellow. This helps prevent dehydration and helps loosen up mucus.  General instructions  · Rest as much as possible.  · Do not drink alcohol.  · Do not use any products  that contain nicotine or tobacco, such as cigarettes and e-cigarettes. If you need help quitting, ask your health care provider.  · Keep all follow-up visits as told by your health care provider. This is important.  How is this prevented?    · Get an annual flu shot. You may get the flu shot in late summer, fall, or winter. Ask your health care provider when you should get your flu shot.  · Avoid exposing others to your respiratory infection.  ? Stay home from work or school as told by your health care provider.  ? Wash your hands with soap and water often, especially after you cough or sneeze. If soap and water are not available, use alcohol-based hand .  · Avoid contact with people who are sick during cold and flu season. This is generally fall and winter.  Contact a health care provider if:  · Your symptoms last for 10 days or longer.  · Your symptoms get worse over time.  · You have a fever.  · You have severe sinus pain in your face or forehead.  · The glands in your jaw or neck become very swollen.  Get help right away if you:  · Feel pain or pressure in your chest.  · Have shortness of breath.  · Faint or feel like you will faint.  · Have severe and persistent vomiting.  · Feel confused or disoriented.  Summary  · A respiratory infection is an illness that affects part of the respiratory system, such as the lungs, nose, or throat. A respiratory infection that is caused by a virus is called a viral respiratory infection.  · Common types of viral respiratory infections are a cold, influenza, and respiratory syncytial virus (RSV) infection.  · Symptoms of this condition include a stuffy or runny nose, cough, sneezing, fatigue, achy muscles, sore throat, and fevers or chills.  · Antibiotic medicines are not prescribed for viral infections. This is because antibiotics are designed to kill bacteria. They are not effective against viruses.  This information is not intended to replace advice given to you by  your health care provider. Make sure you discuss any questions you have with your health care provider.  Document Released: 09/27/2006 Document Revised: 12/26/2019 Document Reviewed: 01/28/2019  [employer] Patient Education © 2020 [employer]      COVID-19  COVID-19 is a respiratory infection that is caused by a virus called severe acute respiratory syndrome coronavirus 2 (SARS-CoV-2). The disease is also known as coronavirus disease or novel coronavirus. In some people, the virus may not cause any symptoms. In others, it may cause a serious infection. The infection can get worse quickly and can lead to complications, such as:  · Pneumonia, or infection of the lungs.  · Acute respiratory distress syndrome or ARDS. This is fluid build-up in the lungs.  · Acute respiratory failure. This is a condition in which there is not enough oxygen passing from the lungs to the body.  · Sepsis or septic shock. This is a serious bodily reaction to an infection.  · Blood clotting problems.  · Secondary infections due to bacteria or fungus.  The virus that causes COVID-19 is contagious. This means that it can spread from person to person through droplets from coughs and sneezes (respiratory secretions).  What are the causes?  This illness is caused by a virus. You may catch the virus by:  · Breathing in droplets from an infected person's cough or sneeze.  · Touching something, like a table or a doorknob, that was exposed to the virus (contaminated) and then touching your mouth, nose, or eyes.  What increases the risk?  Risk for infection  You are more likely to be infected with this virus if you:  · Live in or travel to an area with a COVID-19 outbreak.  · Come in contact with a sick person who recently traveled to an area with a COVID-19 outbreak.  · Provide care for or live with a person who is infected with COVID-19.  Risk for serious illness  You are more likely to become seriously ill from the virus if you:  · Are 65 years of  age or older.  · Have a long-term disease that lowers your body's ability to fight infection (immunocompromised).  · Live in a nursing home or long-term care facility.  · Have a long-term (chronic) disease such as:  ? Chronic lung disease, including chronic obstructive pulmonary disease or asthma  ? Heart disease.  ? Diabetes.  ? Chronic kidney disease.  ? Liver disease.  · Are obese.  What are the signs or symptoms?  Symptoms of this condition can range from mild to severe. Symptoms may appear any time from 2 to 14 days after being exposed to the virus. They include:  · A fever.  · A cough.  · Difficulty breathing.  · Chills.  · Muscle pains.  · A sore throat.  · Loss of taste or smell.  Some people may also have stomach problems, such as nausea, vomiting, or diarrhea.  Other people may not have any symptoms of COVID-19.  How is this diagnosed?  This condition may be diagnosed based on:  · Your signs and symptoms, especially if:  ? You live in an area with a COVID-19 outbreak.  ? You recently traveled to or from an area where the virus is common.  ? You provide care for or live with a person who was diagnosed with COVID-19.  · A physical exam.  · Lab tests, which may include:  ? A nasal swab to take a sample of fluid from your nose.  ? A throat swab to take a sample of fluid from your throat.  ? A sample of mucus from your lungs (sputum).  ? Blood tests.  · Imaging tests, which may include, X-rays, CT scan, or ultrasound.  How is this treated?  At present, there is no medicine to treat COVID-19. Medicines that treat other diseases are being used on a trial basis to see if they are effective against COVID-19.  Your health care provider will talk with you about ways to treat your symptoms. For most people, the infection is mild and can be managed at home with rest, fluids, and over-the-counter medicines.  Treatment for a serious infection usually takes places in a hospital intensive care unit (ICU). It may include  one or more of the following treatments. These treatments are given until your symptoms improve.  · Receiving fluids and medicines through an IV.  · Supplemental oxygen. Extra oxygen is given through a tube in the nose, a face mask, or a gee.  · Positioning you to lie on your stomach (prone position). This makes it easier for oxygen to get into the lungs.  · Continuous positive airway pressure (CPAP) or bi-level positive airway pressure (BPAP) machine. This treatment uses mild air pressure to keep the airways open. A tube that is connected to a motor delivers oxygen to the body.  · Ventilator. This treatment moves air into and out of the lungs by using a tube that is placed in your windpipe.  · Tracheostomy. This is a procedure to create a hole in the neck so that a breathing tube can be inserted.  · Extracorporeal membrane oxygenation (ECMO). This procedure gives the lungs a chance to recover by taking over the functions of the heart and lungs. It supplies oxygen to the body and removes carbon dioxide.  Follow these instructions at home:  Lifestyle  · If you are sick, stay home except to get medical care. Your health care provider will tell you how long to stay home. Call your health care provider before you go for medical care.  · Rest at home as told by your health care provider.  · Do not use any products that contain nicotine or tobacco, such as cigarettes, e-cigarettes, and chewing tobacco. If you need help quitting, ask your health care provider.  · Return to your normal activities as told by your health care provider. Ask your health care provider what activities are safe for you.  General instructions  · Take over-the-counter and prescription medicines only as told by your health care provider.  · Drink enough fluid to keep your urine pale yellow.  · Keep all follow-up visits as told by your health care provider. This is important.  How is this prevented?    There is no vaccine to help prevent COVID-19  infection. However, there are steps you can take to protect yourself and others from this virus.  To protect yourself:   · Do not travel to areas where COVID-19 is a risk. The areas where COVID-19 is reported change often. To identify high-risk areas and travel restrictions, check the [facility] travel website: wwwnc.cdc.gov/travel/notices  · If you live in, or must travel to, an area where COVID-19 is a risk, take precautions to avoid infection.  ? Stay away from people who are sick.  ? Wash your hands often with soap and water for 20 seconds. If soap and water are not available, use an alcohol-based hand .  ? Avoid touching your mouth, face, eyes, or nose.  ? Avoid going out in public, follow guidance from your state and local health authorities.  ? If you must go out in public, wear a cloth face covering or face mask.  ? Disinfect objects and surfaces that are frequently touched every day. This may include:  § Counters and tables.  § Doorknobs and light switches.  § Sinks and faucets.  § Electronics, such as phones, remote controls, keyboards, computers, and tablets.  To protect others:  If you have symptoms of COVID-19, take steps to prevent the virus from spreading to others.  · If you think you have a COVID-19 infection, contact your health care provider right away. Tell your health care team that you think you may have a COVID-19 infection.  · Stay home. Leave your house only to seek medical care. Do not use public transport.  · Do not travel while you are sick.  · Wash your hands often with soap and water for 20 seconds. If soap and water are not available, use alcohol-based hand .  · Stay away from other members of your household. Let healthy household members care for children and pets, if possible. If you have to care for children or pets, wash your hands often and wear a mask. If possible, stay in your own room, separate from others. Use a different bathroom.  · Make sure that all people in  your household wash their hands well and often.  · Cough or sneeze into a tissue or your sleeve or elbow. Do not cough or sneeze into your hand or into the air.  · Wear a cloth face covering or face mask.  Where to find more information  · Centers for Disease Control and Prevention: www.cdc.gov/coronavirus/2019-ncov/index.html  · World Health Organization: www.who.int/health-topics/coronavirus  Contact a health care provider if:  · You live in or have traveled to an area where COVID-19 is a risk and you have symptoms of the infection.  · You have had contact with someone who has COVID-19 and you have symptoms of the infection.  Get help right away if:  · You have trouble breathing.  · You have pain or pressure in your chest.  · You have confusion.  · You have bluish lips and fingernails.  · You have difficulty waking from sleep.  · You have symptoms that get worse.  These symptoms may represent a serious problem that is an emergency. Do not wait to see if the symptoms will go away. Get medical help right away. Call your local emergency services (911 in the [place] Do not drive yourself to the hospital. Let the emergency medical personnel know if you think you have COVID-19.  Summary  · COVID-19 is a respiratory infection that is caused by a virus. It is also known as coronavirus disease or novel coronavirus. It can cause serious infections, such as pneumonia, acute respiratory distress syndrome, acute respiratory failure, or sepsis.  · The virus that causes COVID-19 is contagious. This means that it can spread from person to person through droplets from coughs and sneezes.  · You are more likely to develop a serious illness if you are 65 years of age or older, have a weak immunity, live in a nursing home, or have chronic disease.  · There is no medicine to treat COVID-19. Your health care provider will talk with you about ways to treat your symptoms.  · Take steps to protect yourself and others from infection. Wash  your hands often and disinfect objects and surfaces that are frequently touched every day. Stay away from people who are sick and wear a mask if you are sick.  This information is not intended to replace advice given to you by your health care provider. Make sure you discuss any questions you have with your health care provider.  Document Released: 01/23/2020 Document Revised: 05/14/2020 Document Reviewed: 01/23/2020  Elsevier Patient Education © 2020 [employer]

## 2022-01-08 LAB
SARS-COV-2 RNA RESP QL NAA+PROBE: NOTDETECTED
SPECIMEN SOURCE: NORMAL

## 2022-01-09 DIAGNOSIS — J02.9 PHARYNGITIS, UNSPECIFIED ETIOLOGY: ICD-10-CM

## 2022-01-09 LAB
BACTERIA UR CULT: NORMAL
COVID ORDER STATUS COVID19: NORMAL
SARS-COV-2 RNA RESP QL NAA+PROBE: NOTDETECTED
SIGNIFICANT IND 70042: NORMAL
SITE SITE: NORMAL
SOURCE SOURCE: NORMAL
SPECIMEN SOURCE: NORMAL

## 2022-01-12 LAB
BACTERIA BLD CULT: NORMAL
BACTERIA BLD CULT: NORMAL
SIGNIFICANT IND 70042: NORMAL
SIGNIFICANT IND 70042: NORMAL
SITE SITE: NORMAL
SITE SITE: NORMAL
SOURCE SOURCE: NORMAL
SOURCE SOURCE: NORMAL

## 2022-03-15 DIAGNOSIS — F41.1 GAD (GENERALIZED ANXIETY DISORDER): ICD-10-CM

## 2022-03-15 RX ORDER — PAROXETINE 10 MG/1
10 TABLET, FILM COATED ORAL DAILY
Qty: 90 TABLET | Refills: 0 | Status: SHIPPED | OUTPATIENT
Start: 2022-03-15 | End: 2022-07-06 | Stop reason: SDUPTHER

## 2022-07-06 DIAGNOSIS — F41.1 GAD (GENERALIZED ANXIETY DISORDER): ICD-10-CM

## 2022-07-06 RX ORDER — PAROXETINE 10 MG/1
10 TABLET, FILM COATED ORAL DAILY
Qty: 30 TABLET | Refills: 0 | Status: SHIPPED | OUTPATIENT
Start: 2022-07-06 | End: 2022-08-24 | Stop reason: SDUPTHER

## 2022-07-06 NOTE — TELEPHONE ENCOUNTER
Received request via: Pharmacy    Was the patient seen in the last year in this department? Yes 11/23/21    Does the patient have an active prescription (recently filled or refills available) for medication(s) requested? No

## 2022-08-04 NOTE — ED NOTES
"Pt ambulated into ED C/O CP, SOB. Pt Stated \" Aliza had chest pain since last night, it feels like someone is squeezing my chest and its so hard to breath\". Pt coughing and wheezing in triage.  " resting/sleeping

## 2022-08-24 DIAGNOSIS — F41.1 GAD (GENERALIZED ANXIETY DISORDER): ICD-10-CM

## 2022-08-25 RX ORDER — PAROXETINE 10 MG/1
10 TABLET, FILM COATED ORAL DAILY
Qty: 30 TABLET | Refills: 0 | Status: SHIPPED | OUTPATIENT
Start: 2022-08-25 | End: 2022-10-25 | Stop reason: SDUPTHER

## 2022-10-03 ENCOUNTER — TELEPHONE (OUTPATIENT)
Dept: SCHEDULING | Facility: IMAGING CENTER | Age: 52
End: 2022-10-03

## 2022-10-24 RX ORDER — ASPIRIN 325 MG
600 TABLET ORAL EVERY 12 HOURS
COMMUNITY
Start: 2022-08-18 | End: 2022-10-25

## 2022-10-24 RX ORDER — BENZONATATE 200 MG/1
CAPSULE ORAL
COMMUNITY
Start: 2022-08-18 | End: 2022-10-25

## 2022-10-24 RX ORDER — ONDANSETRON 4 MG/1
TABLET, ORALLY DISINTEGRATING ORAL
COMMUNITY
Start: 2022-08-18 | End: 2022-10-25

## 2022-10-25 ENCOUNTER — HOSPITAL ENCOUNTER (OUTPATIENT)
Facility: MEDICAL CENTER | Age: 52
End: 2022-10-25
Attending: PHYSICIAN ASSISTANT
Payer: COMMERCIAL

## 2022-10-25 ENCOUNTER — OFFICE VISIT (OUTPATIENT)
Dept: MEDICAL GROUP | Facility: PHYSICIAN GROUP | Age: 52
End: 2022-10-25
Payer: COMMERCIAL

## 2022-10-25 VITALS
HEIGHT: 68 IN | OXYGEN SATURATION: 94 % | HEART RATE: 70 BPM | SYSTOLIC BLOOD PRESSURE: 104 MMHG | TEMPERATURE: 99.2 F | WEIGHT: 140.25 LBS | DIASTOLIC BLOOD PRESSURE: 80 MMHG | RESPIRATION RATE: 16 BRPM | BODY MASS INDEX: 21.26 KG/M2

## 2022-10-25 DIAGNOSIS — F43.9 STRESS: ICD-10-CM

## 2022-10-25 DIAGNOSIS — Z72.0 TOBACCO USE: ICD-10-CM

## 2022-10-25 DIAGNOSIS — F32.2 MAJOR DEPRESSIVE DISORDER, SEVERE (HCC): ICD-10-CM

## 2022-10-25 DIAGNOSIS — Z12.31 ENCOUNTER FOR SCREENING MAMMOGRAM FOR MALIGNANT NEOPLASM OF BREAST: ICD-10-CM

## 2022-10-25 DIAGNOSIS — N89.8 VAGINAL ODOR: ICD-10-CM

## 2022-10-25 DIAGNOSIS — F41.1 GAD (GENERALIZED ANXIETY DISORDER): ICD-10-CM

## 2022-10-25 DIAGNOSIS — Z13.6 SCREENING FOR CARDIOVASCULAR CONDITION: ICD-10-CM

## 2022-10-25 DIAGNOSIS — G47.00 INSOMNIA, UNSPECIFIED TYPE: ICD-10-CM

## 2022-10-25 DIAGNOSIS — K63.5 POLYP OF COLON, UNSPECIFIED PART OF COLON, UNSPECIFIED TYPE: ICD-10-CM

## 2022-10-25 DIAGNOSIS — R51.9 NONINTRACTABLE HEADACHE, UNSPECIFIED CHRONICITY PATTERN, UNSPECIFIED HEADACHE TYPE: ICD-10-CM

## 2022-10-25 DIAGNOSIS — R53.83 OTHER FATIGUE: ICD-10-CM

## 2022-10-25 PROCEDURE — 87660 TRICHOMONAS VAGIN DIR PROBE: CPT

## 2022-10-25 PROCEDURE — 99214 OFFICE O/P EST MOD 30 MIN: CPT | Performed by: PHYSICIAN ASSISTANT

## 2022-10-25 PROCEDURE — 87480 CANDIDA DNA DIR PROBE: CPT

## 2022-10-25 PROCEDURE — 87510 GARDNER VAG DNA DIR PROBE: CPT

## 2022-10-25 RX ORDER — AMITRIPTYLINE HYDROCHLORIDE 25 MG/1
25 TABLET, FILM COATED ORAL NIGHTLY PRN
Qty: 30 TABLET | Refills: 1 | Status: SHIPPED | OUTPATIENT
Start: 2022-10-25 | End: 2022-12-15

## 2022-10-25 RX ORDER — PAROXETINE 10 MG/1
10 TABLET, FILM COATED ORAL DAILY
Qty: 90 TABLET | Refills: 0 | Status: SHIPPED | OUTPATIENT
Start: 2022-10-25 | End: 2023-02-13

## 2022-10-25 ASSESSMENT — ENCOUNTER SYMPTOMS
FEVER: 0
CHILLS: 0
SHORTNESS OF BREATH: 0

## 2022-10-25 ASSESSMENT — PATIENT HEALTH QUESTIONNAIRE - PHQ9
CLINICAL INTERPRETATION OF PHQ2 SCORE: 2
5. POOR APPETITE OR OVEREATING: 0 - NOT AT ALL
SUM OF ALL RESPONSES TO PHQ QUESTIONS 1-9: 11

## 2022-10-25 ASSESSMENT — FIBROSIS 4 INDEX: FIB4 SCORE: 1.2

## 2022-10-25 NOTE — PROGRESS NOTES
Subjective:     CC: To establish as a new patient    HPI:   Rachel presents today with    Problem   Nonintractable Headache    Chronic, unstable.  Started about a year ago.  Has woken her from sleep in the past.  Always on the left side of the head.  Episodes 2-3 times per month, lasting the entire day.  Ibuprofen does seem to help but takes all day to resolve.  Reports associated nausea and photosensitivity.     Vaginal Odor    Acute.  Started about 2 weeks ago.     Denies any vaginal discharge.  Denies concerns for STIs.  Has not been sexually active for a long time.     Major Depressive Disorder, Severe (Hcc)    Chronic, stable.  Duration: years  PHQ screen: 11 (10/25/2022)  Associated symptoms:  Suicidal ideation/homicidal ideation: None  Therapy/counseling:  Medications: Paxil 10 mg; didn't tolerate 20 mg   Improving/worsening: stable       Stress    Chronic, stable.  Improved but still there.  Life, work, home, everything.     Polyp of Colon    Chronic, stable.  Followed by GI.  Cologuard yearly.     Tobacco Use    Chronic, uncontrolled.  Continues to smoke and vape.  Smokes 1/4-1/2 pack/day.     Artur (Generalized Anxiety Disorder)    Chronic, stable.  Tolerating paroxetine 10 mg.     Insomnia    Chronic, uncontrolled.  Has a difficult time falling asleep at night.     Depression (Resolved)    Patient on viibryd           Depression Screening    Little interest or pleasure in doing things?  1 - several days   Feeling down, depressed , or hopeless? 1 - several days   Trouble falling or staying asleep, or sleeping too much?  3 - nearly every day   Feeling tired or having little energy?  3 - nearly every day   Poor appetite or overeating?  0 - not at all   Feeling bad about yourself - or that you are a failure or have let yourself or your family down? 0 - not at all   Trouble concentrating on things, such as reading the newspaper or watching television? 2 - more than half the days   Moving or speaking so slowly  "that other people could have noticed.  Or the opposite - being so fidgety or restless that you have been moving around a lot more than usual?  1 - several days   Thoughts that you would be better off dead, or of hurting yourself?  0 - not at all   Patient Health Questionnaire Score: 11       If depressive symptoms identified deferred to follow up visit unless specifically addressed in assesment and plan.    Interpretation of PHQ-9 Total Score   Score Severity   1-4 No Depression   5-9 Mild Depression   10-14 Moderate Depression   15-19 Moderately Severe Depression   20-27 Severe Depression    Health Maintenance: Completed    ROS:  Review of Systems   Constitutional:  Negative for chills and fever.   Respiratory:  Negative for shortness of breath.    Cardiovascular:  Negative for chest pain.     Objective:     Exam:  /80 (BP Location: Left arm, Patient Position: Sitting, BP Cuff Size: Adult)   Pulse 70   Temp 37.3 °C (99.2 °F) (Temporal)   Resp 16   Ht 1.715 m (5' 7.5\")   Wt 63.6 kg (140 lb 4 oz)   LMP 09/27/2008   SpO2 94%   Breastfeeding No   BMI 21.64 kg/m²  Body mass index is 21.64 kg/m².    Physical Exam  Constitutional:       Appearance: Normal appearance.   HENT:      Head: Normocephalic.      Right Ear: Tympanic membrane normal.      Left Ear: Tympanic membrane normal.      Mouth/Throat:      Mouth: Mucous membranes are moist.   Eyes:      Extraocular Movements: Extraocular movements intact.      Conjunctiva/sclera: Conjunctivae normal.      Pupils: Pupils are equal, round, and reactive to light.   Cardiovascular:      Rate and Rhythm: Normal rate and regular rhythm.      Heart sounds: Normal heart sounds.   Pulmonary:      Effort: Pulmonary effort is normal.      Breath sounds: Normal breath sounds.   Musculoskeletal:      Cervical back: Normal range of motion and neck supple.   Skin:     General: Skin is warm.   Neurological:      General: No focal deficit present.      Mental Status: She is " alert.      Cranial Nerves: No cranial nerve deficit.      Sensory: No sensory deficit.   Psychiatric:         Mood and Affect: Mood normal.         Assessment & Plan:     52 y.o. female with the following -     1. Major depressive disorder, severe (HCC)  2. WILFRID (generalized anxiety disorder)  Chronic, stable.  Tolerating paroxetine.    - PARoxetine (PAXIL) 10 MG Tab; Take 1 Tablet by mouth every day.  Dispense: 90 Tablet; Refill: 0    3. Vaginal odor  Acute.  Vaginal pathogens ordered today.    - VAGINAL PATHOGENS DNA PANEL; Future    4. Nonintractable headache, unspecified chronicity pattern, unspecified headache type  Chronic, unstable.  Headache on the left side of the head only.  Has woken her from sleep.    - MR-BRAIN-W/O; Future    5. Stress  Chronic, stable.  Patient does continue to have stress but states it  has improved overall.    6. Polyp of colon, unspecified part of colon, unspecified type  Chronic, stable.  Annual Cologuard test.  Followed by GI.    7. Other fatigue  Chronic, stable.  May be due to sleep.    - CBC WITH DIFFERENTIAL; Future  - Comp Metabolic Panel; Future  - TSH WITH REFLEX TO FT4; Future    8. Encounter for screening mammogram for malignant neoplasm of breast    - MA-SCREENING MAMMO BILAT W/TOMOSYNTHESIS W/CAD; Future    9. Screening for cardiovascular condition    - Lipid Profile; Future    10. Insomnia, unspecified type  Chronic, uncontrolled.  Trial amitriptyline.    11. Tobacco use  Chronic, uncontrolled.  Not quite ready to quit.        Return in about 1 month (around 11/25/2022) for discuss labs.    Please note that this dictation was created using voice recognition software. I have made every reasonable attempt to correct obvious errors, but I expect that there are errors of grammar and possibly content that I did not discover before finalizing the note.

## 2022-10-26 DIAGNOSIS — N89.8 VAGINAL ODOR: ICD-10-CM

## 2022-10-26 LAB
CANDIDA DNA VAG QL PROBE+SIG AMP: NEGATIVE
G VAGINALIS DNA VAG QL PROBE+SIG AMP: POSITIVE
T VAGINALIS DNA VAG QL PROBE+SIG AMP: NEGATIVE

## 2022-10-26 RX ORDER — METRONIDAZOLE 500 MG/1
500 TABLET ORAL 2 TIMES DAILY
Qty: 14 TABLET | Refills: 0 | Status: SHIPPED | OUTPATIENT
Start: 2022-10-26 | End: 2022-11-17 | Stop reason: SDUPTHER

## 2022-11-17 RX ORDER — METRONIDAZOLE 500 MG/1
500 TABLET ORAL 2 TIMES DAILY
Qty: 14 TABLET | Refills: 0 | Status: SHIPPED | OUTPATIENT
Start: 2022-11-17 | End: 2022-11-24

## 2023-02-13 DIAGNOSIS — F41.1 GAD (GENERALIZED ANXIETY DISORDER): ICD-10-CM

## 2023-02-13 RX ORDER — PAROXETINE 10 MG/1
10 TABLET, FILM COATED ORAL DAILY
Qty: 90 TABLET | Refills: 0 | Status: SHIPPED | OUTPATIENT
Start: 2023-02-13 | End: 2023-05-16

## 2023-02-14 ENCOUNTER — TELEPHONE (OUTPATIENT)
Dept: MEDICAL GROUP | Facility: PHYSICIAN GROUP | Age: 53
End: 2023-02-14
Payer: COMMERCIAL

## 2023-02-14 NOTE — TELEPHONE ENCOUNTER
Caller Name: Rachel Jacobs    Call Back Number: [phone] (home)       How would the patient prefer to be contacted with a response: Phone call OK to leave a detailed message    2. SPECIFIC Action To Be Taken: Referral pending, please sign.    3. Diagnosis/Clinical Reason for Request: Patient hurt her knee, requesting for orthopedic referral    4. Specialty & Provider Name/Lab/Imaging Location: Orthopedic    5. Is appointment scheduled for requested order/referral: no    Patient was not informed they will receive a return phone call from the office ONLY if there are any questions before processing their request. Advised to call back if they haven't received a call from the referral department in 5 days.

## 2023-02-16 ENCOUNTER — TELEPHONE (OUTPATIENT)
Dept: MEDICAL GROUP | Facility: PHYSICIAN GROUP | Age: 53
End: 2023-02-16
Payer: COMMERCIAL

## 2023-02-16 NOTE — TELEPHONE ENCOUNTER
Phone Number Called: [phone] (home)       Call outcome: Spoke to patient regarding message below.    Message: patient lvm regarding needing an appointment for a referral to orthopedic.  The earliest appointment isn't until next week, so patient requested for an earlier one.  Called patient back and helped her schedule an appointment to see Dr. Edelmira Lim for 2/17/2023 at 10:40am.

## 2023-02-17 ENCOUNTER — OFFICE VISIT (OUTPATIENT)
Dept: MEDICAL GROUP | Facility: PHYSICIAN GROUP | Age: 53
End: 2023-02-17
Payer: COMMERCIAL

## 2023-02-17 VITALS
DIASTOLIC BLOOD PRESSURE: 76 MMHG | BODY MASS INDEX: 23.1 KG/M2 | HEIGHT: 67 IN | SYSTOLIC BLOOD PRESSURE: 124 MMHG | WEIGHT: 147.2 LBS | OXYGEN SATURATION: 96 % | TEMPERATURE: 98.1 F | RESPIRATION RATE: 16 BRPM | HEART RATE: 87 BPM

## 2023-02-17 DIAGNOSIS — S89.91XA INJURY OF RIGHT KNEE, INITIAL ENCOUNTER: ICD-10-CM

## 2023-02-17 PROCEDURE — 99999 PR NO CHARGE: CPT | Performed by: FAMILY MEDICINE

## 2023-02-17 RX ORDER — CLINDAMYCIN HYDROCHLORIDE 150 MG/1
CAPSULE ORAL
COMMUNITY
Start: 2022-12-15 | End: 2023-10-03

## 2023-02-17 RX ORDER — CHLORHEXIDINE GLUCONATE ORAL RINSE 1.2 MG/ML
SOLUTION DENTAL
COMMUNITY
Start: 2022-12-15 | End: 2023-10-03

## 2023-02-17 ASSESSMENT — PATIENT HEALTH QUESTIONNAIRE - PHQ9: CLINICAL INTERPRETATION OF PHQ2 SCORE: 0

## 2023-02-17 ASSESSMENT — FIBROSIS 4 INDEX: FIB4 SCORE: 1.222222222222222222

## 2023-02-17 NOTE — PROGRESS NOTES
"Subjective:     CC:   Chief Complaint   Patient presents with    Follow-Up       HPI:   Rachel presents today she had a knee injury would recommend she go to [facility] we will no charge her    Past Medical History:   Diagnosis Date    Depression     Endometriosis     Macrocytosis 3/2014    Nephrolithiasis        Social History     Tobacco Use    Smoking status: Every Day     Packs/day: 0.25     Years: 30.00     Pack years: 7.50     Types: Cigarettes    Smokeless tobacco: Never   Vaping Use    Vaping Use: Every day    Substances: Nicotine, Flavoring    Devices: Disposable   Substance Use Topics    Alcohol use: Yes     Alcohol/week: 0.6 oz     Types: 1 Standard drinks or equivalent per week     Comment: every other weekend    Drug use: Yes     Types: Marijuana, Inhaled     Comment: Marijuana; h/o smoking meth x 5 years; denies h/o IVDU       Current Outpatient Medications Ordered in Epic   Medication Sig Dispense Refill    clindamycin (CLEOCIN) 150 MG Cap TAKE 1 CAPSULE BY MOUTH FOUR TIMES DAILY UNTIL GONE      chlorhexidine (PERIDEX) 0.12 % Solution SWISH AND SPIT 15 ML BY MOUTH TWICE DAILY      PARoxetine (PAXIL) 10 MG Tab TAKE 1 TABLET BY MOUTH EVERY DAY 90 Tablet 0    amitriptyline (ELAVIL) 25 MG Tab TAKE 1 TABLET BY MOUTH AT BEDTIME AS NEEDED FOR SLEEP 90 Tablet 1     No current Epic-ordered facility-administered medications on file.       Allergies:  Pcn [penicillins]      Objective:     Exam:  /76 (BP Location: Left arm, Patient Position: Sitting, BP Cuff Size: Adult)   Pulse 87   Temp 36.7 °C (98.1 °F) (Temporal)   Resp 16   Ht 1.702 m (5' 7\")   Wt 66.8 kg (147 lb 3.2 oz)   LMP 09/27/2008   SpO2 96%   BMI 23.05 kg/m²  Body mass index is 23.05 kg/m².  Musculoskeletal: Normal gait.  Patient does have a effusion to her right knee and she is limping  Neuro: Oriented to person, place and time  Psych: Mood is wnl         Assessment & Plan:     53 y.o. female with the following -     1. " Injury of right knee, initial encounter               Please note that this dictation was created using voice recognition software. I have made every reasonable attempt to correct obvious errors, but I expect that there are errors of grammar and possibly content that I did not discover before finalizing the note.

## 2023-03-16 ENCOUNTER — HOSPITAL ENCOUNTER (OUTPATIENT)
Dept: RADIOLOGY | Facility: MEDICAL CENTER | Age: 53
End: 2023-03-16
Attending: PHYSICIAN ASSISTANT
Payer: COMMERCIAL

## 2023-03-16 DIAGNOSIS — R51.9 NONINTRACTABLE HEADACHE, UNSPECIFIED CHRONICITY PATTERN, UNSPECIFIED HEADACHE TYPE: ICD-10-CM

## 2023-03-16 PROCEDURE — 70551 MRI BRAIN STEM W/O DYE: CPT

## 2023-04-13 PROBLEM — S83.249A MEDIAL MENISCUS TEAR: Status: ACTIVE | Noted: 2023-04-13

## 2023-06-13 RX ORDER — AMITRIPTYLINE HYDROCHLORIDE 25 MG/1
25 TABLET, FILM COATED ORAL NIGHTLY PRN
Qty: 90 TABLET | Refills: 1 | Status: SHIPPED | OUTPATIENT
Start: 2023-06-13 | End: 2023-11-27

## 2023-10-03 ENCOUNTER — HOSPITAL ENCOUNTER (OUTPATIENT)
Facility: MEDICAL CENTER | Age: 53
End: 2023-10-03
Attending: PHYSICIAN ASSISTANT
Payer: COMMERCIAL

## 2023-10-03 ENCOUNTER — OFFICE VISIT (OUTPATIENT)
Dept: MEDICAL GROUP | Facility: PHYSICIAN GROUP | Age: 53
End: 2023-10-03
Payer: COMMERCIAL

## 2023-10-03 VITALS
HEART RATE: 76 BPM | OXYGEN SATURATION: 97 % | TEMPERATURE: 98.8 F | HEIGHT: 67 IN | DIASTOLIC BLOOD PRESSURE: 80 MMHG | WEIGHT: 150.38 LBS | SYSTOLIC BLOOD PRESSURE: 120 MMHG | BODY MASS INDEX: 23.6 KG/M2 | RESPIRATION RATE: 16 BRPM

## 2023-10-03 DIAGNOSIS — R31.9 HEMATURIA, UNSPECIFIED TYPE: ICD-10-CM

## 2023-10-03 DIAGNOSIS — G47.00 INSOMNIA, UNSPECIFIED TYPE: ICD-10-CM

## 2023-10-03 DIAGNOSIS — Z12.31 ENCOUNTER FOR SCREENING MAMMOGRAM FOR MALIGNANT NEOPLASM OF BREAST: ICD-10-CM

## 2023-10-03 DIAGNOSIS — Z78.9 DRINKS ALCOHOL: ICD-10-CM

## 2023-10-03 DIAGNOSIS — F43.9 STRESS: ICD-10-CM

## 2023-10-03 DIAGNOSIS — Z72.0 TOBACCO USE: ICD-10-CM

## 2023-10-03 DIAGNOSIS — R23.2 HOT FLASHES: ICD-10-CM

## 2023-10-03 DIAGNOSIS — Z13.6 SCREENING FOR CARDIOVASCULAR CONDITION: ICD-10-CM

## 2023-10-03 DIAGNOSIS — F32.2 MAJOR DEPRESSIVE DISORDER, SEVERE (HCC): ICD-10-CM

## 2023-10-03 DIAGNOSIS — Z11.59 NEED FOR HEPATITIS C SCREENING TEST: ICD-10-CM

## 2023-10-03 DIAGNOSIS — R51.9 NONINTRACTABLE HEADACHE, UNSPECIFIED CHRONICITY PATTERN, UNSPECIFIED HEADACHE TYPE: ICD-10-CM

## 2023-10-03 DIAGNOSIS — L98.9 SKIN LESION: ICD-10-CM

## 2023-10-03 DIAGNOSIS — Z12.11 COLON CANCER SCREENING: ICD-10-CM

## 2023-10-03 DIAGNOSIS — E55.9 VITAMIN D DEFICIENCY: ICD-10-CM

## 2023-10-03 DIAGNOSIS — R53.83 OTHER FATIGUE: ICD-10-CM

## 2023-10-03 DIAGNOSIS — F41.1 GAD (GENERALIZED ANXIETY DISORDER): ICD-10-CM

## 2023-10-03 LAB
APPEARANCE UR: NORMAL
BILIRUB UR STRIP-MCNC: NEGATIVE MG/DL
COLOR UR AUTO: NORMAL
CYTOLOGY REG CYTOL: NORMAL
GLUCOSE UR STRIP.AUTO-MCNC: NEGATIVE MG/DL
KETONES UR STRIP.AUTO-MCNC: NEGATIVE MG/DL
LEUKOCYTE ESTERASE UR QL STRIP.AUTO: NEGATIVE
NITRITE UR QL STRIP.AUTO: NEGATIVE
PH UR STRIP.AUTO: 5.5 [PH] (ref 5–8)
PROT UR QL STRIP: NEGATIVE MG/DL
RBC UR QL AUTO: NORMAL
SP GR UR STRIP.AUTO: 1.01
UROBILINOGEN UR STRIP-MCNC: 0.2 MG/DL

## 2023-10-03 PROCEDURE — 88112 CYTOPATH CELL ENHANCE TECH: CPT

## 2023-10-03 PROCEDURE — 81002 URINALYSIS NONAUTO W/O SCOPE: CPT | Performed by: PHYSICIAN ASSISTANT

## 2023-10-03 PROCEDURE — 87086 URINE CULTURE/COLONY COUNT: CPT

## 2023-10-03 PROCEDURE — 3079F DIAST BP 80-89 MM HG: CPT | Performed by: PHYSICIAN ASSISTANT

## 2023-10-03 PROCEDURE — 99214 OFFICE O/P EST MOD 30 MIN: CPT | Performed by: PHYSICIAN ASSISTANT

## 2023-10-03 PROCEDURE — 3074F SYST BP LT 130 MM HG: CPT | Performed by: PHYSICIAN ASSISTANT

## 2023-10-03 RX ORDER — BUPROPION HYDROCHLORIDE 75 MG/1
75 TABLET ORAL 2 TIMES DAILY
Qty: 60 TABLET | Refills: 1 | Status: SHIPPED | OUTPATIENT
Start: 2023-10-03 | End: 2023-12-27

## 2023-10-03 ASSESSMENT — PATIENT HEALTH QUESTIONNAIRE - PHQ9
CLINICAL INTERPRETATION OF PHQ2 SCORE: 2
5. POOR APPETITE OR OVEREATING: 0 - NOT AT ALL
SUM OF ALL RESPONSES TO PHQ9 QUESTIONS 1 AND 2: 2
SUM OF ALL RESPONSES TO PHQ QUESTIONS 1-9: 9
2. FEELING DOWN, DEPRESSED, IRRITABLE, OR HOPELESS: MORE THAN HALF THE DAYS

## 2023-10-03 ASSESSMENT — ENCOUNTER SYMPTOMS
SHORTNESS OF BREATH: 0
FEVER: 0
CHILLS: 0

## 2023-10-03 ASSESSMENT — FIBROSIS 4 INDEX: FIB4 SCORE: 1.222222222222222222

## 2023-10-03 NOTE — PROGRESS NOTES
"Subjective:     CC:       HPI:   Loren presents today with    Problem   Hematuria    Acute, uncontrolled.  X 5 days.  History of renal stones --> feels different.  Having mild \"period cramping.\"   Yesterday: hematuria, cramping, no loose stool.  Having some loose stool.  S/p hysterectomy.  Smokes cigarettes; currently down to 1-1.5 packs/week.     Drinks Alcohol    Chronic, uncontrolled.   1 beer plus 3-4 shots/day.  High stress.  Mom is 91 and may not last much longer.  Takes care of her 68 year old brother who has cancer, dying (renal; h/o brain CA).  Both live locally.     Hot Flashes    Chronic, controlled.  Tolerating paroxetine 10mg and it helps.  Doesn't want to stop taking.     Nonintractable Headache    Chronic, unstable.  Started about a year ago.  Has woken her from sleep in the past.  Always on the left side of the head.  Episodes 2-3 times per month, lasting the entire day.  Ibuprofen does seem to help but takes all day to resolve.  Reports associated nausea and photosensitivity.     3/16/2023 Brain MRI  Normal     10/3/2023:  Chronic, uncontrolled.  Still having HAs.  Will refer to neurology.     Major Depressive Disorder, Severe (Hcc)    Chronic, stable.  Duration: years  PHQ screen: 11 (10/25/2022)  Associated symptoms:  Suicidal ideation/homicidal ideation: None  Therapy/counseling:  Medications: Paxil 10 mg; didn't tolerate 20 mg   Improving/worsening: stable     10/3/2023:  Chronic, uncontrolled.  PHQ9: 9  History of medications: xanax and ambien. Otherwise no medications.    Mom is 91 and not doing well.  68 year old brother has cancer and is dying.  Took her roommates xanax.   Tried to talk to someone at work but it took her 4 days to call her back.  Has been exercising but is struggling at nighttime.    Paroxetine helps with hot flases but doubling her dose makes her feel bad.  Will add Wellbutrin 75 mg BID.     Stress    Chronic, stable.  Improved but still there.  Life, work, home, " "everything.     10/3/2023:  Worsening.  Mom is 91, taking care of her 68 year old brother who is dying from cancer.     Tobacco Use    Chronic, uncontrolled.  Continues to smoke and vape.  Smokes 1/4-1/2 pack/day.     10/3/2023:  Chronic, uncontrolled.  Down to 1-1.5 packs per week.  High stress currently.     Artur (Generalized Anxiety Disorder)    Chronic, stable but having worsening situational depression.  Tolerating paroxetine 10 mg.     Insomnia    Chronic, uncontrolled.  Has a difficult time falling asleep at night.   Tolerating amitriptyline 25mg nightly --> sometimes doesn't help.  Will increase to 50mg.             ROS:  Review of Systems   Constitutional:  Negative for chills and fever.   Respiratory:  Negative for shortness of breath.    Cardiovascular:  Negative for chest pain.       Objective:     Exam:  /80 (BP Location: Right arm, Patient Position: Sitting, BP Cuff Size: Large adult)   Pulse 76   Temp 37.1 °C (98.8 °F) (Temporal)   Resp 16   Ht 1.702 m (5' 7\")   Wt 68.2 kg (150 lb 6 oz)   LMP 09/27/2008   SpO2 97%   Breastfeeding No   BMI 23.55 kg/m²  Body mass index is 23.55 kg/m².    Physical Exam  Vitals reviewed.   Constitutional:       General: She is not in acute distress.     Appearance: Normal appearance.   Pulmonary:      Effort: Pulmonary effort is normal.   Neurological:      General: No focal deficit present.      Mental Status: She is alert.   Psychiatric:         Mood and Affect: Mood normal.         Behavior: Behavior normal.         Judgment: Judgment normal.             Assessment & Plan:     53 y.o. female with the following -     1. Hematuria, unspecified type  Acute, uncontrolled.  UA: large blood.  Sending for culture and cytology.  History of renal stones.   Tobacco smoker for years.  Will contact patient with results.   Referring to urology.    - POCT Urinalysis  - URINE CULTURE(NEW); Future  - Referral to Urology  - URINE CYTOLOGY    2. Tobacco use  Chronic, " uncontrolled but improving.  Down to 1-1.5 packs per week.    3. Major depressive disorder, severe (HCC)  Chronic, uncontrolled.  Continue paroxetine (but primarily on this for hot flashes).  Doesn't tolerate higher than 10mg.  Adding wellbutrin.  Situationally worse as patient's brother is dying and her mother is 91 and ailing.  Follow up in 2-4 weeks for re-evaluation, sooner as needed.    - buPROPion (WELLBUTRIN) 75 MG Tab; Take 1 Tablet by mouth 2 times a day.  Dispense: 60 Tablet; Refill: 1    4. WILFRID (generalized anxiety disorder)  Chronic, controlled.  Continue paroxetine 10mg daily.  Adding wellbutrin for depression.    - buPROPion (WELLBUTRIN) 75 MG Tab; Take 1 Tablet by mouth 2 times a day.  Dispense: 60 Tablet; Refill: 1    5. Stress  Chronic, uncontrolled.  See #3.    6. Insomnia, unspecified type  Chronic, uncontrolled.  See #3.    7. Nonintractable headache, unspecified chronicity pattern, unspecified headache type  Chronic, uncontrolled but stable.  Referring to neurology.    - CBC WITH DIFFERENTIAL; Future  - Comp Metabolic Panel; Future  - Referral to Neurology    8. Drinks alcohol  Chronic, uncontrolled.  Drinking 1 beer and 3-4 shots daily.  Admittedly self medicating given her current stress level.  Discussed cutting back while adding additional medication.    - CBC WITH DIFFERENTIAL; Future  - Comp Metabolic Panel; Future    9. Vitamin D deficiency  Chronic, uncontrolled.  Due to repeat labs.    - VITAMIN D,25 HYDROXY (DEFICIENCY); Future    10. Skin lesion  Has one primary lesion on her back that is most consistent with seborrheic keratosis but has multiple skin lesions.    - Referral to Dermatology    11. Need for hepatitis C screening test    - HEP C VIRUS ANTIBODY; Future    12. Screening for cardiovascular condition    - Lipid Profile; Future    13. Other fatigue  Chronic, uncontrolled.  Checking labs.    - CBC WITH DIFFERENTIAL; Future  - Comp Metabolic Panel; Future  - TSH WITH REFLEX TO  FT4; Future    14. Colon cancer screening    - Referral to GI for Colonoscopy    15. Encounter for screening mammogram for malignant neoplasm of breast    - MA-SCREENING MAMMO BILAT W/TOMOSYNTHESIS W/CAD; Future    16. Hot flashes  Chronic, controlled.  Continue paroxetine 10mg daily.            Return in about 4 weeks (around 10/31/2023).    Please note that this dictation was created using voice recognition software. I have made every reasonable attempt to correct obvious errors, but I expect that there are errors of grammar and possibly content that I did not discover before finalizing the note.

## 2023-10-06 LAB
BACTERIA UR CULT: NORMAL
SIGNIFICANT IND 70042: NORMAL
SITE SITE: NORMAL
SOURCE SOURCE: NORMAL

## 2023-10-12 ENCOUNTER — TELEPHONE (OUTPATIENT)
Dept: HEALTH INFORMATION MANAGEMENT | Facility: OTHER | Age: 53
End: 2023-10-12
Payer: COMMERCIAL

## 2023-10-19 ENCOUNTER — HOSPITAL ENCOUNTER (OUTPATIENT)
Facility: MEDICAL CENTER | Age: 53
End: 2023-10-19
Attending: PHYSICIAN ASSISTANT
Payer: COMMERCIAL

## 2023-10-19 LAB
APPEARANCE UR: ABNORMAL
BACTERIA #/AREA URNS HPF: ABNORMAL /HPF
BILIRUB UR QL STRIP.AUTO: NEGATIVE
CAOX CRY #/AREA URNS HPF: ABNORMAL /HPF
COLOR UR: YELLOW
EPI CELLS #/AREA URNS HPF: ABNORMAL /HPF
GLUCOSE UR STRIP.AUTO-MCNC: NEGATIVE MG/DL
HYALINE CASTS #/AREA URNS LPF: ABNORMAL /LPF
KETONES UR STRIP.AUTO-MCNC: NEGATIVE MG/DL
LEUKOCYTE ESTERASE UR QL STRIP.AUTO: ABNORMAL
MICRO URNS: ABNORMAL
NITRITE UR QL STRIP.AUTO: NEGATIVE
PH UR STRIP.AUTO: 6 [PH] (ref 5–8)
PROT UR QL STRIP: NEGATIVE MG/DL
RBC # URNS HPF: ABNORMAL /HPF
RBC UR QL AUTO: ABNORMAL
SP GR UR STRIP.AUTO: 1.02
UROBILINOGEN UR STRIP.AUTO-MCNC: 0.2 MG/DL
WBC #/AREA URNS HPF: ABNORMAL /HPF

## 2023-10-19 PROCEDURE — 87086 URINE CULTURE/COLONY COUNT: CPT

## 2023-10-19 PROCEDURE — 87077 CULTURE AEROBIC IDENTIFY: CPT

## 2023-10-19 PROCEDURE — 81001 URINALYSIS AUTO W/SCOPE: CPT

## 2023-10-21 LAB
BACTERIA UR CULT: NORMAL
SIGNIFICANT IND 70042: NORMAL
SITE SITE: NORMAL
SOURCE SOURCE: NORMAL

## 2023-10-31 ENCOUNTER — OFFICE VISIT (OUTPATIENT)
Dept: MEDICAL GROUP | Facility: PHYSICIAN GROUP | Age: 53
End: 2023-10-31
Payer: COMMERCIAL

## 2023-10-31 VITALS
HEIGHT: 67 IN | RESPIRATION RATE: 16 BRPM | BODY MASS INDEX: 23.76 KG/M2 | WEIGHT: 151.38 LBS | HEART RATE: 85 BPM | OXYGEN SATURATION: 95 % | SYSTOLIC BLOOD PRESSURE: 120 MMHG | DIASTOLIC BLOOD PRESSURE: 72 MMHG | TEMPERATURE: 98.4 F

## 2023-10-31 DIAGNOSIS — R31.9 HEMATURIA, UNSPECIFIED TYPE: ICD-10-CM

## 2023-10-31 DIAGNOSIS — F32.2 MAJOR DEPRESSIVE DISORDER, SEVERE (HCC): ICD-10-CM

## 2023-10-31 DIAGNOSIS — K63.5 POLYP OF COLON, UNSPECIFIED PART OF COLON, UNSPECIFIED TYPE: ICD-10-CM

## 2023-10-31 DIAGNOSIS — R51.9 NONINTRACTABLE HEADACHE, UNSPECIFIED CHRONICITY PATTERN, UNSPECIFIED HEADACHE TYPE: ICD-10-CM

## 2023-10-31 DIAGNOSIS — Z78.9 DRINKS ALCOHOL: ICD-10-CM

## 2023-10-31 DIAGNOSIS — F43.9 STRESS: ICD-10-CM

## 2023-10-31 DIAGNOSIS — F41.1 GAD (GENERALIZED ANXIETY DISORDER): ICD-10-CM

## 2023-10-31 PROCEDURE — 3074F SYST BP LT 130 MM HG: CPT | Performed by: PHYSICIAN ASSISTANT

## 2023-10-31 PROCEDURE — 3078F DIAST BP <80 MM HG: CPT | Performed by: PHYSICIAN ASSISTANT

## 2023-10-31 PROCEDURE — 99214 OFFICE O/P EST MOD 30 MIN: CPT | Performed by: PHYSICIAN ASSISTANT

## 2023-10-31 RX ORDER — PAROXETINE 10 MG/1
10 TABLET, FILM COATED ORAL DAILY
COMMUNITY
End: 2024-02-15

## 2023-10-31 ASSESSMENT — PATIENT HEALTH QUESTIONNAIRE - PHQ9
CLINICAL INTERPRETATION OF PHQ2 SCORE: 3
SUM OF ALL RESPONSES TO PHQ QUESTIONS 1-9: 8
5. POOR APPETITE OR OVEREATING: 0 - NOT AT ALL

## 2023-10-31 ASSESSMENT — FIBROSIS 4 INDEX: FIB4 SCORE: 1.222222222222222222

## 2023-10-31 ASSESSMENT — ENCOUNTER SYMPTOMS
FEVER: 0
CHILLS: 0
SHORTNESS OF BREATH: 0

## 2023-10-31 NOTE — PROGRESS NOTES
"Subjective:     CC: follow up chronic issues    HPI:   Loren presents today with the following:    Problem   Hematuria    Acute, uncontrolled.  X 5 days.  History of renal stones --> feels different.  Having mild \"period cramping.\"   Yesterday: hematuria, cramping, no loose stool.  Having some loose stool.  S/p hysterectomy.  Smokes cigarettes; currently down to 1-1.5 packs/week.      [facility]  [address]  [place] [address]  Phone: [phone]    10/31/2023:  Chronic, intermittent.  Has been evaluated by urology.  Told to follow up in 6 months.         Drinks Alcohol    Chronic, uncontrolled.   1 beer plus 3-4 shots/day.  High stress.  Mom is 91 and may not last much longer.  Takes care of her 68 year old brother who has cancer, dying (renal; h/o brain CA).  Both live locally.      10/31/2023:  No change in alcohol use.     Nonintractable Headache    Chronic, unstable.  Started about a year ago.  Has woken her from sleep in the past.  Always on the left side of the head.  Episodes 2-3 times per month, lasting the entire day.  Ibuprofen does seem to help but takes all day to resolve.  Reports associated nausea and photosensitivity.     3/16/2023 Brain MRI  Normal     10/3/2023:  Chronic, uncontrolled.  Still having HAs.  Will refer to neurology.    Neurology [address], [address]  [place] [address]  Phone: [phone]    10/31/2023:  Chronic, uncontrolled.  Somewhat better.  Not as frequent or as bad.  Did notice that blue blocker glasses seem to help.  Does need to get her eyes checked.       Major Depressive Disorder, Severe (Hcc)    Chronic, stable.  Duration: years  PHQ screen: 11 (10/25/2022)  Associated symptoms:  Suicidal ideation/homicidal ideation: None  Therapy/counseling:  Medications: Paxil 10 mg; didn't tolerate 20 mg   Improving/worsening: stable     10/3/2023:  Chronic, uncontrolled.  PHQ9: 9  History of medications: xanax and ambien. Otherwise no medications.    Mom is 91 and not " "doing well.  68 year old brother has cancer and is dying.  Took her roommates xanax.   Tried to talk to someone at work but it took her 4 days to call her back.  Has been exercising but is struggling at nighttime.    Paroxetine helps with hot flases but doubling her dose makes her feel bad.  Will add Wellbutrin 75 mg BID.    10/31/2023:  Chronic, uncontrolled.  Somewhat improved.  Still tolerating paroxetine 10 and feels wellbutrin 75mg BID is helping some.  Feels she is sleeping better.  Will let me know via [employer] if she wants to increase her wellbutrin.     Stress    Chronic, stable.  Improved but still there.  Life, work, home, everything.     10/3/2023:  Worsening.  Mom is 91, taking care of her 68 year old brother who is dying from cancer.      10/31/2023:  Chronic, uncontrolled.  Still taking care of her 91 year old mom and 68 year old brother.  Health for both is stable at the moment.     Polyp of Colon    Chronic, stable.  Followed by GI.  History of + cologuard.  Due to follow up with GI but hasn't yet.     Artur (Generalized Anxiety Disorder)    Chronic, stable but having worsening situational depression.  Tolerating paroxetine 10 mg.             ROS:  Review of Systems   Constitutional:  Negative for chills and fever.   Respiratory:  Negative for shortness of breath.    Cardiovascular:  Negative for chest pain.       Objective:     Exam:  /72 (BP Location: Left arm, Patient Position: Sitting, BP Cuff Size: Adult)   Pulse 85   Temp 36.9 °C (98.4 °F) (Temporal)   Resp 16   Ht 1.702 m (5' 7\")   Wt 68.7 kg (151 lb 6 oz)   LMP 09/27/2008   SpO2 95%   Breastfeeding No   BMI 23.71 kg/m²  Body mass index is 23.71 kg/m².    Physical Exam  Vitals reviewed.   Constitutional:       General: She is not in acute distress.     Appearance: Normal appearance.   Pulmonary:      Effort: Pulmonary effort is normal.   Neurological:      General: No focal deficit present.      Mental Status: She is alert. "   Psychiatric:         Mood and Affect: Mood normal.         Behavior: Behavior normal.         Judgment: Judgment normal.         Assessment & Plan:     53 y.o. female with the following -     1. Major depressive disorder, severe (HCC)  2. WILFRID (generalized anxiety disorder)  3. Stress  4. Drinks alcohol  Chronic, uncontrolled.  Continue paroxetine 10 mg daily and Wellbutrin 75 mg twice daily.  Patient will let me know via [employer] if she wants to increase her Wellbutrin dose.  She states she has not had any change in her alcohol use.  Her stress level has not changed.    5. Hematuria, unspecified type  Chronic, stable.  Has follow-up with urology in 6 months.    6. Nonintractable headache, unspecified chronicity pattern, unspecified headache type  Chronic, stable.  Her headaches have improved slightly with the use of blue blocker glasses.    7. Polyp of colon, unspecified part of colon, unspecified type  Chronic, uncontrolled.  Due to follow-up with GI.      Labs to be drawn:            Return for lab discussion.    Please note that this dictation was created using voice recognition software. I have made every reasonable attempt to correct obvious errors, but I expect that there are errors of grammar and possibly content that I did not discover before finalizing the note.

## 2023-11-27 RX ORDER — AMITRIPTYLINE HYDROCHLORIDE 25 MG/1
25 TABLET, FILM COATED ORAL NIGHTLY PRN
Qty: 90 TABLET | Refills: 1 | Status: SHIPPED | OUTPATIENT
Start: 2023-11-27

## 2023-12-25 DIAGNOSIS — F41.1 GAD (GENERALIZED ANXIETY DISORDER): ICD-10-CM

## 2023-12-25 DIAGNOSIS — F32.2 MAJOR DEPRESSIVE DISORDER, SEVERE (HCC): ICD-10-CM

## 2023-12-27 RX ORDER — BUPROPION HYDROCHLORIDE 75 MG/1
75 TABLET ORAL 2 TIMES DAILY
Qty: 180 TABLET | Refills: 0 | Status: SHIPPED | OUTPATIENT
Start: 2023-12-27

## 2024-01-31 ENCOUNTER — HOSPITAL ENCOUNTER (OUTPATIENT)
Dept: LAB | Facility: MEDICAL CENTER | Age: 54
End: 2024-01-31
Attending: PHYSICIAN ASSISTANT
Payer: COMMERCIAL

## 2024-01-31 DIAGNOSIS — R53.83 OTHER FATIGUE: ICD-10-CM

## 2024-01-31 DIAGNOSIS — Z78.9 DRINKS ALCOHOL: ICD-10-CM

## 2024-01-31 DIAGNOSIS — Z11.59 NEED FOR HEPATITIS C SCREENING TEST: ICD-10-CM

## 2024-01-31 DIAGNOSIS — Z13.6 SCREENING FOR CARDIOVASCULAR CONDITION: ICD-10-CM

## 2024-01-31 DIAGNOSIS — R51.9 NONINTRACTABLE HEADACHE, UNSPECIFIED CHRONICITY PATTERN, UNSPECIFIED HEADACHE TYPE: ICD-10-CM

## 2024-01-31 DIAGNOSIS — E55.9 VITAMIN D DEFICIENCY: ICD-10-CM

## 2024-01-31 LAB
25(OH)D3 SERPL-MCNC: 18 NG/ML (ref 30–100)
ALBUMIN SERPL BCP-MCNC: 4 G/DL (ref 3.2–4.9)
ALBUMIN/GLOB SERPL: 1.4 G/DL
ALP SERPL-CCNC: 92 U/L (ref 30–99)
ALT SERPL-CCNC: 20 U/L (ref 2–50)
ANION GAP SERPL CALC-SCNC: 10 MMOL/L (ref 7–16)
AST SERPL-CCNC: 22 U/L (ref 12–45)
BASOPHILS # BLD AUTO: 0.8 % (ref 0–1.8)
BASOPHILS # BLD: 0.05 K/UL (ref 0–0.12)
BILIRUB SERPL-MCNC: 0.5 MG/DL (ref 0.1–1.5)
BUN SERPL-MCNC: 12 MG/DL (ref 8–22)
CALCIUM ALBUM COR SERPL-MCNC: 9 MG/DL (ref 8.5–10.5)
CALCIUM SERPL-MCNC: 9 MG/DL (ref 8.5–10.5)
CHLORIDE SERPL-SCNC: 103 MMOL/L (ref 96–112)
CHOLEST SERPL-MCNC: 215 MG/DL (ref 100–199)
CO2 SERPL-SCNC: 26 MMOL/L (ref 20–33)
CREAT SERPL-MCNC: 0.9 MG/DL (ref 0.5–1.4)
EOSINOPHIL # BLD AUTO: 0.26 K/UL (ref 0–0.51)
EOSINOPHIL NFR BLD: 4.1 % (ref 0–6.9)
ERYTHROCYTE [DISTWIDTH] IN BLOOD BY AUTOMATED COUNT: 45.9 FL (ref 35.9–50)
FASTING STATUS PATIENT QL REPORTED: NORMAL
GFR SERPLBLD CREATININE-BSD FMLA CKD-EPI: 75 ML/MIN/1.73 M 2
GLOBULIN SER CALC-MCNC: 2.9 G/DL (ref 1.9–3.5)
GLUCOSE SERPL-MCNC: 102 MG/DL (ref 65–99)
HCT VFR BLD AUTO: 42.2 % (ref 37–47)
HCV AB SER QL: NORMAL
HDLC SERPL-MCNC: 59 MG/DL
HGB BLD-MCNC: 14.1 G/DL (ref 12–16)
IMM GRANULOCYTES # BLD AUTO: 0.01 K/UL (ref 0–0.11)
IMM GRANULOCYTES NFR BLD AUTO: 0.2 % (ref 0–0.9)
LDLC SERPL CALC-MCNC: 140 MG/DL
LYMPHOCYTES # BLD AUTO: 2.7 K/UL (ref 1–4.8)
LYMPHOCYTES NFR BLD: 42.7 % (ref 22–41)
MCH RBC QN AUTO: 33.7 PG (ref 27–33)
MCHC RBC AUTO-ENTMCNC: 33.4 G/DL (ref 32.2–35.5)
MCV RBC AUTO: 101 FL (ref 81.4–97.8)
MONOCYTES # BLD AUTO: 0.46 K/UL (ref 0–0.85)
MONOCYTES NFR BLD AUTO: 7.3 % (ref 0–13.4)
NEUTROPHILS # BLD AUTO: 2.85 K/UL (ref 1.82–7.42)
NEUTROPHILS NFR BLD: 44.9 % (ref 44–72)
NRBC # BLD AUTO: 0 K/UL
NRBC BLD-RTO: 0 /100 WBC (ref 0–0.2)
PLATELET # BLD AUTO: 247 K/UL (ref 164–446)
PMV BLD AUTO: 11 FL (ref 9–12.9)
POTASSIUM SERPL-SCNC: 3.7 MMOL/L (ref 3.6–5.5)
PROT SERPL-MCNC: 6.9 G/DL (ref 6–8.2)
RBC # BLD AUTO: 4.18 M/UL (ref 4.2–5.4)
SODIUM SERPL-SCNC: 139 MMOL/L (ref 135–145)
TRIGL SERPL-MCNC: 79 MG/DL (ref 0–149)
TSH SERPL DL<=0.005 MIU/L-ACNC: 1.29 UIU/ML (ref 0.38–5.33)
WBC # BLD AUTO: 6.3 K/UL (ref 4.8–10.8)

## 2024-01-31 PROCEDURE — 85025 COMPLETE CBC W/AUTO DIFF WBC: CPT

## 2024-01-31 PROCEDURE — 84443 ASSAY THYROID STIM HORMONE: CPT

## 2024-01-31 PROCEDURE — 86803 HEPATITIS C AB TEST: CPT

## 2024-01-31 PROCEDURE — 82306 VITAMIN D 25 HYDROXY: CPT

## 2024-01-31 PROCEDURE — 80061 LIPID PANEL: CPT

## 2024-01-31 PROCEDURE — 80053 COMPREHEN METABOLIC PANEL: CPT

## 2024-01-31 PROCEDURE — 36415 COLL VENOUS BLD VENIPUNCTURE: CPT

## 2024-02-05 DIAGNOSIS — R73.01 IFG (IMPAIRED FASTING GLUCOSE): ICD-10-CM

## 2024-02-05 DIAGNOSIS — E78.5 DYSLIPIDEMIA: ICD-10-CM

## 2024-02-05 DIAGNOSIS — D75.89 MACROCYTOSIS: ICD-10-CM

## 2024-02-14 NOTE — TELEPHONE ENCOUNTER
Received request via: Pharmacy    Was the patient seen in the last year in this department? Yes    Does the patient have an active prescription (recently filled or refills available) for medication(s) requested? No    [facility]    Does the patient have nursing home Plus and need 100 day supply (blood pressure, diabetes and cholesterol meds only)? Patient does not have SCP

## 2024-02-15 RX ORDER — PAROXETINE 10 MG/1
10 TABLET, FILM COATED ORAL
Qty: 90 TABLET | Refills: 3 | Status: SHIPPED | OUTPATIENT
Start: 2024-02-15

## 2024-03-24 DIAGNOSIS — F32.2 MAJOR DEPRESSIVE DISORDER, SEVERE (HCC): ICD-10-CM

## 2024-03-24 DIAGNOSIS — F41.1 GAD (GENERALIZED ANXIETY DISORDER): ICD-10-CM

## 2024-03-28 RX ORDER — BUPROPION HYDROCHLORIDE 75 MG/1
75 TABLET ORAL 2 TIMES DAILY
Qty: 180 TABLET | Refills: 2 | Status: SHIPPED | OUTPATIENT
Start: 2024-03-28

## 2024-04-29 RX ORDER — AMITRIPTYLINE HYDROCHLORIDE 25 MG/1
25 TABLET, FILM COATED ORAL NIGHTLY PRN
Qty: 90 TABLET | Refills: 1 | Status: SHIPPED | OUTPATIENT
Start: 2024-04-29

## 2025-02-06 ENCOUNTER — TELEMEDICINE (OUTPATIENT)
Dept: MEDICAL GROUP | Facility: PHYSICIAN GROUP | Age: 55
End: 2025-02-06
Payer: COMMERCIAL

## 2025-02-06 VITALS — HEIGHT: 67 IN | RESPIRATION RATE: 16 BRPM | WEIGHT: 160 LBS | BODY MASS INDEX: 25.11 KG/M2

## 2025-02-06 DIAGNOSIS — D75.89 MACROCYTOSIS: ICD-10-CM

## 2025-02-06 DIAGNOSIS — Z12.31 ENCOUNTER FOR SCREENING MAMMOGRAM FOR MALIGNANT NEOPLASM OF BREAST: ICD-10-CM

## 2025-02-06 DIAGNOSIS — F41.1 GAD (GENERALIZED ANXIETY DISORDER): ICD-10-CM

## 2025-02-06 DIAGNOSIS — F32.2 MAJOR DEPRESSIVE DISORDER, SEVERE (HCC): ICD-10-CM

## 2025-02-06 DIAGNOSIS — D75.89 MACROCYTOSIS WITHOUT ANEMIA: ICD-10-CM

## 2025-02-06 DIAGNOSIS — E55.9 VITAMIN D DEFICIENCY: ICD-10-CM

## 2025-02-06 DIAGNOSIS — E78.5 DYSLIPIDEMIA: ICD-10-CM

## 2025-02-06 DIAGNOSIS — R73.01 IFG (IMPAIRED FASTING GLUCOSE): ICD-10-CM

## 2025-02-06 DIAGNOSIS — F43.9 STRESS: ICD-10-CM

## 2025-02-06 DIAGNOSIS — Z78.9 DRINKS ALCOHOL: ICD-10-CM

## 2025-02-06 DIAGNOSIS — R23.2 HOT FLASHES: ICD-10-CM

## 2025-02-06 DIAGNOSIS — R53.83 FATIGUE, UNSPECIFIED TYPE: ICD-10-CM

## 2025-02-06 DIAGNOSIS — K63.5 POLYP OF COLON, UNSPECIFIED PART OF COLON, UNSPECIFIED TYPE: ICD-10-CM

## 2025-02-06 DIAGNOSIS — R51.9 NONINTRACTABLE HEADACHE, UNSPECIFIED CHRONICITY PATTERN, UNSPECIFIED HEADACHE TYPE: ICD-10-CM

## 2025-02-06 DIAGNOSIS — G47.00 INSOMNIA, UNSPECIFIED TYPE: ICD-10-CM

## 2025-02-06 DIAGNOSIS — R31.9 HEMATURIA, UNSPECIFIED TYPE: ICD-10-CM

## 2025-02-06 DIAGNOSIS — Z72.0 TOBACCO USE: ICD-10-CM

## 2025-02-06 PROBLEM — S89.91XA RIGHT KNEE INJURY: Status: RESOLVED | Noted: 2023-02-17 | Resolved: 2025-02-06

## 2025-02-06 PROBLEM — S83.249A MEDIAL MENISCUS TEAR: Status: RESOLVED | Noted: 2023-04-13 | Resolved: 2025-02-06

## 2025-02-06 PROBLEM — N89.8 VAGINAL ODOR: Status: RESOLVED | Noted: 2022-10-25 | Resolved: 2025-02-06

## 2025-02-06 PROCEDURE — 99214 OFFICE O/P EST MOD 30 MIN: CPT | Mod: 95 | Performed by: PHYSICIAN ASSISTANT

## 2025-02-06 RX ORDER — PROPRANOLOL HYDROCHLORIDE 10 MG/1
10-20 TABLET ORAL 3 TIMES DAILY PRN
Qty: 90 TABLET | Refills: 1 | Status: SHIPPED | OUTPATIENT
Start: 2025-02-06

## 2025-02-06 RX ORDER — ONDANSETRON 4 MG/1
TABLET, ORALLY DISINTEGRATING ORAL
COMMUNITY
End: 2025-02-06

## 2025-02-06 ASSESSMENT — FIBROSIS 4 INDEX: FIB4 SCORE: 1.14

## 2025-02-06 ASSESSMENT — ENCOUNTER SYMPTOMS
CHILLS: 0
SHORTNESS OF BREATH: 0
FEVER: 0

## 2025-02-06 NOTE — ASSESSMENT & PLAN NOTE
Interval history 10/2023:  Chronic, stable but having worsening situational depression.  Tolerating paroxetine 10 mg.

## 2025-02-06 NOTE — ASSESSMENT & PLAN NOTE
Chronic, uncontrolled.  EtOH: 1-2 coronas/night; and 1-4 shots of Fireball/night  Seeing a therapist through work for stress/grief.    Interval history 10/2023:  Chronic, uncontrolled.   1 beer plus 3-4 shots/day.  High stress.  Mom is 91 and may not last much longer.  Takes care of her 68 year old brother who has cancer, dying (renal; h/o brain CA).  Both live locally.      10/31/2023:  No change in alcohol use.
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
## 2025-02-06 NOTE — ASSESSMENT & PLAN NOTE
Interval history 10/2023:  Chronic, unstable.  Started about a year ago.  Has woken her from sleep in the past.  Always on the left side of the head.  Episodes 2-3 times per month, lasting the entire day.  Ibuprofen does seem to help but takes all day to resolve.  Reports associated nausea and photosensitivity.     3/16/2023 Brain MRI  Normal     10/3/2023:  Chronic, uncontrolled.  Still having HAs.  Will refer to neurology.    Neurology [facility], [address]  [facility] [address]  Phone: [phone]    10/31/2023:  Chronic, uncontrolled.  Somewhat better.  Not as frequent or as bad.  Did notice that blue blocker glasses seem to help.  Does need to get her eyes checked.

## 2025-02-06 NOTE — ASSESSMENT & PLAN NOTE
Chronic, uncontrolled.     Latest Labs:   Lab Results   Component Value Date/Time    CHOLSTRLTOT 215 (H) 01/31/2024 09:11 AM     (H) 01/31/2024 09:11 AM    HDL 59 01/31/2024 09:11 AM    TRIGLYCERIDE 79 01/31/2024 09:11 AM        Medications: none    Risk calculator: The 10-year ASCVD risk score (Larry TELLEZ, et al., 2019) is: 4.9%

## 2025-02-06 NOTE — ASSESSMENT & PLAN NOTE
Chronic, uncontrolled.  Continues to smoke and vape.  Smokes 1/4-1/2 pack/day.     10/3/2023:  Chronic, uncontrolled.  Down to 1-1.5 packs per week.  High stress currently.

## 2025-02-06 NOTE — PROGRESS NOTES
Virtual Visit:  This visit was conducted via Zoom using secure and encrypted videoconferencing technology.   The patient was in their home in the [facility].    The patient's identity was confirmed and verbal consent was obtained for this virtual visit.   SUBJECTIVE:     CC: follow up chronic issues; Last evaluation 10/31/2023.    HPI:   Loren presents today with the following:    ASSESSMENT & PLAN by Problem:     Problem   Dyslipidemia    Chronic, uncontrolled.  Increase plant-based foods, decrease animal-based foods.   The 10-year ASCVD risk score (Larry TELLEZ, et al., 2019) is: 4.9%       Hematuria    Chronic, control unknown.  Never did see urology.  States she passed her stone and doesn't plan on following up.     Drinks Alcohol    Chronic, uncontrolled.  EtOH: 1-2 coronas/night; and 1-4 shots of Fireball/night  Seeing a therapist through work for stress/grief.     Hot Flashes    Chronic, controlled.  Tolerating paroxetine 10mg and it helps.     Nonintractable Headache   Major Depressive Disorder, Severe (Hcc)   Stress    Chronic, uncontrolled.  Tolerating/continue paroxetine 10mg, amitriptyline 25-50mg nightly.  Adding propranolol 10-20 mg TID prn anxiety.     Polyp of Colon    Chronic, stable.  Followed by GI.  History of + cologuard.  Due to follow up with GI but hasn't yet.  Has a lot going on right now.     Tobacco Use    Chronic, uncontrolled.  Continues to smoke and vape.  Smokes 1/4-1/2 pack/day.   Tried to quit but restarted due to stress/grief.     Macrocytosis Without Anemia    Chronic, uncontrolled.  Checking additional labs.     Artur (Generalized Anxiety Disorder)    Chronic, stable.   Tolerating/continue paroxetine 10 mg.       Insomnia    Chronic, stable.   Tolerating/continue amitriptyline 25mg nightly.     Medial Meniscus Tear (Resolved)    Added automatically from request for surgery [id number]     Right Knee Injury (Resolved)   Vaginal Odor (Resolved)    Acute.  Started about 2 weeks ago.    "  Denies any vaginal discharge.  Denies concerns for STIs.  Has not been sexually active for a long time.         Influenza: Pneumococcal, Tdap: Telemedicine visit.    Mammogram: Due.  Colon cancer screen: Due.    Due to repeat labs.  Follow-up to go over results.    Return in about 6 weeks (around 3/20/2025) for lab discussion; stress/grief.        Healthcare Maintenance:      HPI:     Problem List Items Addressed This Visit       Drinks alcohol     Chronic, uncontrolled.  EtOH: 1-2 coronas/night; and 1-4 shots of Fireball/night  Seeing a therapist through work for stress/grief.    Interval history 10/2023:  Chronic, uncontrolled.   1 beer plus 3-4 shots/day.  High stress.  Mom is 91 and may not last much longer.  Takes care of her 68 year old brother who has cancer, dying (renal; h/o brain CA).  Both live locally.      10/31/2023:  No change in alcohol use.         Dyslipidemia     Chronic, uncontrolled.     Latest Labs:   Lab Results   Component Value Date/Time    CHOLSTRLTOT 215 (H) 01/31/2024 09:11 AM     (H) 01/31/2024 09:11 AM    HDL 59 01/31/2024 09:11 AM    TRIGLYCERIDE 79 01/31/2024 09:11 AM        Medications: none    Risk calculator: The 10-year ASCVD risk score (Larry TELLEZ, et al., 2019) is: 4.9%            Relevant Orders    Lipid Profile    WILFRID (generalized anxiety disorder)       Interval history 10/2023:  Chronic, stable but having worsening situational depression.  Tolerating paroxetine 10 mg.           Relevant Medications    amitriptyline (ELAVIL) 25 MG Tab    propranolol (INDERAL) 10 MG Tab    Hematuria     Chronic, control unknown.  Never did see urology.  States she passed her stone and doesn't plan on following up.    Interval history 10/2023:  Acute, uncontrolled.  X 5 days.  History of renal stones --> feels different.  Having mild \"period cramping.\"   Yesterday: hematuria, cramping, no loose stool.  Having some loose stool.  S/p hysterectomy.  Smokes cigarettes; currently down to " 1-1.5 packs/week.      [facility]  [address]  [place] [address]  Phone: [phone]    10/31/2023:  Chronic, intermittent.  Has been evaluated by urology.  Told to follow up in 6 months.             Hot flashes     Chronic, controlled.  Tolerating paroxetine 10mg and it helps.         Relevant Medications    propranolol (INDERAL) 10 MG Tab    Insomnia       Interval history 10/2023:  Chronic, uncontrolled.  Has a difficult time falling asleep at night.   Tolerating amitriptyline 25mg nightly --> sometimes doesn't help.  Will increase to 50mg.         Relevant Medications    amitriptyline (ELAVIL) 25 MG Tab    Macrocytosis without anemia    Major depressive disorder, severe (HCC)       Interval history 10/2023:  Chronic, stable.  Duration: years  PHQ screen: 11 (10/25/2022)  Associated symptoms:  Suicidal ideation/homicidal ideation: None  Therapy/counseling:  Medications: Paxil 10 mg; didn't tolerate 20 mg   Improving/worsening: stable     10/3/2023:  Chronic, uncontrolled.  PHQ9: 9  History of medications: xanax and ambien. Otherwise no medications.    Mom is 91 and not doing well.  68 year old brother has cancer and is dying.  Took her roommates xanax.   Tried to talk to someone at work but it took her 4 days to call her back.  Has been exercising but is struggling at nighttime.    Paroxetine helps with hot flases but doubling her dose makes her feel bad.  Will add Wellbutrin 75 mg BID.    10/31/2023:  Chronic, uncontrolled.  Somewhat improved.  Still tolerating paroxetine 10 and feels wellbutrin 75mg BID is helping some.  Feels she is sleeping better.  Will let me know via [employer] if she wants to increase her wellbutrin.         Relevant Medications    amitriptyline (ELAVIL) 25 MG Tab    Nonintractable headache       Interval history 10/2023:  Chronic, unstable.  Started about a year ago.  Has woken her from sleep in the past.  Always on the left side of the head.  Episodes 2-3 times per month, lasting  the entire day.  Ibuprofen does seem to help but takes all day to resolve.  Reports associated nausea and photosensitivity.     3/16/2023 Brain MRI  Normal     10/3/2023:  Chronic, uncontrolled.  Still having HAs.  Will refer to neurology.    [facility]  [address], [address]  [place] [address]  Phone: [phone]    10/31/2023:  Chronic, uncontrolled.  Somewhat better.  Not as frequent or as bad.  Did notice that blue blocker glasses seem to help.  Does need to get her eyes checked.           Polyp of colon    Stress     Chronic, uncontrolled.  Two more family members who are terminal  Lost 3 family members in one accident  Lost 2 other family members who had been sick  (Lost all 5 in the last 6 months)  EtOH: 1-2 coronas/night; and 1-4 shots of Fireball/night  Using friend's xanax  Was trying to quit smoking  Seeing a therapist through work.  Was 3x/week. Then 2x/week.  Recently lost a 5th family member so she is scheduled to see her next week.  Does reports thoughts of hurting herself but not now and has/had no plan.  Has a 22 year old daughter.    Interval history 10/2023:  Chronic, stable.  Improved but still there.  Life, work, home, everything.     10/3/2023:  Worsening.  Mom is 91, taking care of her 68 year old brother who is dying from cancer.      10/31/2023:  Chronic, uncontrolled.  Still taking care of her 91 year old mom and 68 year old brother.  Health for both is stable at the moment.         Relevant Medications    propranolol (INDERAL) 10 MG Tab    Tobacco use     Chronic, uncontrolled.  Continues to smoke and vape.  Smokes 1/4-1/2 pack/day.     10/3/2023:  Chronic, uncontrolled.  Down to 1-1.5 packs per week.  High stress currently.          Other Visit Diagnoses       Macrocytosis        Relevant Orders    CBC WITH DIFFERENTIAL    VITAMIN B12    FOLATE    LDH    RETICULOCYTES COUNT    Monoclonal Protein and FLC, Serum    Fatigue, unspecified type        Relevant Orders    Comp Metabolic  "Panel    TSH WITH REFLEX TO FT4    IFG (impaired fasting glucose)        Relevant Orders    HEMOGLOBIN A1C    Vitamin D deficiency        Relevant Orders    VITAMIN D,25 HYDROXY (DEFICIENCY)    Encounter for screening mammogram for malignant neoplasm of breast        Relevant Orders    MA-SCREENING MAMMO BILAT W/TOMOSYNTHESIS W/CAD                   ROS:  Review of Systems   Constitutional:  Negative for chills and fever.   Respiratory:  Negative for shortness of breath.    Cardiovascular:  Negative for chest pain.       OBJECTIVE:     Exam:  Resp 16   Ht 1.702 m (5' 7\") Comment: per patient  Wt 72.6 kg (160 lb) Comment: per patient  LMP 09/27/2008   BMI 25.06 kg/m²  Body mass index is 25.06 kg/m².    Physical Exam  Vitals reviewed.   Constitutional:       General: She is not in acute distress.     Appearance: Normal appearance.   Pulmonary:      Effort: Pulmonary effort is normal.   Neurological:      General: No focal deficit present.      Mental Status: She is alert.   Psychiatric:         Mood and Affect: Mood normal.         Behavior: Behavior normal.         Judgment: Judgment normal.           CHART REVIEW:     Labs:                  Please note that this dictation was created using voice recognition software. I have made every reasonable attempt to correct obvious errors, but I expect that there are errors of grammar and possibly content that I did not discover before finalizing the note.zing the note.  "

## 2025-02-06 NOTE — ASSESSMENT & PLAN NOTE
"Chronic, control unknown.  Never did see urology.  States she passed her stone and doesn't plan on following up.    Interval history 10/2023:  Acute, uncontrolled.  X 5 days.  History of renal stones --> feels different.  Having mild \"period cramping.\"   Yesterday: hematuria, cramping, no loose stool.  Having some loose stool.  S/p hysterectomy.  Smokes cigarettes; currently down to 1-1.5 packs/week.      [facility]  [address] [place] [address]  Phone: [phone]    10/31/2023:  Chronic, intermittent.  Has been evaluated by urology.  Told to follow up in 6 months.      "

## 2025-02-06 NOTE — ASSESSMENT & PLAN NOTE
Interval history 10/2023:  Chronic, uncontrolled.  Has a difficult time falling asleep at night.   Tolerating amitriptyline 25mg nightly --> sometimes doesn't help.  Will increase to 50mg.

## 2025-02-06 NOTE — ASSESSMENT & PLAN NOTE
Interval history 10/2023:  Chronic, stable.  Duration: years  PHQ screen: 11 (10/25/2022)  Associated symptoms:  Suicidal ideation/homicidal ideation: None  Therapy/counseling:  Medications: Paxil 10 mg; didn't tolerate 20 mg   Improving/worsening: stable     10/3/2023:  Chronic, uncontrolled.  PHQ9: 9  History of medications: xanax and ambien. Otherwise no medications.    Mom is 91 and not doing well.  68 year old brother has cancer and is dying.  Took her roommates xanax.   Tried to talk to someone at work but it took her 4 days to call her back.  Has been exercising but is struggling at nighttime.    Paroxetine helps with hot flases but doubling her dose makes her feel bad.  Will add Wellbutrin 75 mg BID.    10/31/2023:  Chronic, uncontrolled.  Somewhat improved.  Still tolerating paroxetine 10 and feels wellbutrin 75mg BID is helping some.  Feels she is sleeping better.  Will let me know via [employer] if she wants to increase her wellbutrin.

## 2025-02-06 NOTE — PATIENT INSTRUCTIONS
Due to have your labs drawn.  You can schedule this or walk-in.  Please fast for at least 8 hours prior to lab draw.    You can schedule your mammogram by calling [phone].

## 2025-02-07 NOTE — TELEPHONE ENCOUNTER
[facility] Call Center    Phone Message    May a detailed message be left on voicemail: yes     Reason for Call: Other: Tessa called requesting to speak with her care team after her virtual visit with Randa Ann earlier this morning. Please reach out to Tessa to discuss. Thank you!     Action Taken: Other: Cardiology    Travel Screening: Not Applicable    Thank you!  [facility]       Date of Service:                                                                       Received request via: Pharmacy    Was the patient seen in the last year in this department? Yes    Does the patient have an active prescription (recently filled or refills available) for medication(s) requested? No    Does the patient have [facility] Plus and need 100 day supply (blood pressure, diabetes and cholesterol meds only)? Patient does not have SCP

## 2025-03-15 ENCOUNTER — HOSPITAL ENCOUNTER (OUTPATIENT)
Dept: LAB | Facility: MEDICAL CENTER | Age: 55
End: 2025-03-15
Attending: PHYSICIAN ASSISTANT
Payer: COMMERCIAL

## 2025-03-15 DIAGNOSIS — R53.83 FATIGUE, UNSPECIFIED TYPE: ICD-10-CM

## 2025-03-15 DIAGNOSIS — E55.9 VITAMIN D DEFICIENCY: ICD-10-CM

## 2025-03-15 DIAGNOSIS — E78.5 DYSLIPIDEMIA: ICD-10-CM

## 2025-03-15 DIAGNOSIS — D75.89 MACROCYTOSIS: ICD-10-CM

## 2025-03-15 DIAGNOSIS — R73.01 IFG (IMPAIRED FASTING GLUCOSE): ICD-10-CM

## 2025-03-15 LAB
25(OH)D3 SERPL-MCNC: 19 NG/ML (ref 30–100)
ALBUMIN SERPL BCP-MCNC: 4.1 G/DL (ref 3.2–4.9)
ALBUMIN/GLOB SERPL: 1.5 G/DL
ALP SERPL-CCNC: 93 U/L (ref 30–99)
ALT SERPL-CCNC: 29 U/L (ref 2–50)
ANION GAP SERPL CALC-SCNC: 11 MMOL/L (ref 7–16)
AST SERPL-CCNC: 27 U/L (ref 12–45)
BASOPHILS # BLD AUTO: 1.2 % (ref 0–1.8)
BASOPHILS # BLD: 0.09 K/UL (ref 0–0.12)
BILIRUB SERPL-MCNC: 0.4 MG/DL (ref 0.1–1.5)
BUN SERPL-MCNC: 14 MG/DL (ref 8–22)
CALCIUM ALBUM COR SERPL-MCNC: 9 MG/DL (ref 8.5–10.5)
CALCIUM SERPL-MCNC: 9.1 MG/DL (ref 8.5–10.5)
CHLORIDE SERPL-SCNC: 107 MMOL/L (ref 96–112)
CHOLEST SERPL-MCNC: 203 MG/DL (ref 100–199)
CO2 SERPL-SCNC: 25 MMOL/L (ref 20–33)
CREAT SERPL-MCNC: 0.91 MG/DL (ref 0.5–1.4)
EOSINOPHIL # BLD AUTO: 0.41 K/UL (ref 0–0.51)
EOSINOPHIL NFR BLD: 5.4 % (ref 0–6.9)
ERYTHROCYTE [DISTWIDTH] IN BLOOD BY AUTOMATED COUNT: 45.1 FL (ref 35.9–50)
EST. AVERAGE GLUCOSE BLD GHB EST-MCNC: 103 MG/DL
FASTING STATUS PATIENT QL REPORTED: NORMAL
FOLATE SERPL-MCNC: 6.6 NG/ML
GFR SERPLBLD CREATININE-BSD FMLA CKD-EPI: 73 ML/MIN/1.73 M 2
GLOBULIN SER CALC-MCNC: 2.7 G/DL (ref 1.9–3.5)
GLUCOSE SERPL-MCNC: 88 MG/DL (ref 65–99)
HBA1C MFR BLD: 5.2 % (ref 4–5.6)
HCT VFR BLD AUTO: 41.2 % (ref 37–47)
HDLC SERPL-MCNC: 61 MG/DL
HGB BLD-MCNC: 13.9 G/DL (ref 12–16)
HGB RETIC QN AUTO: 38.2 PG/CELL (ref 29–35)
IMM GRANULOCYTES # BLD AUTO: 0.04 K/UL (ref 0–0.11)
IMM GRANULOCYTES NFR BLD AUTO: 0.5 % (ref 0–0.9)
IMM RETICS NFR: 10.1 % (ref 2.6–16.1)
LDH SERPL L TO P-CCNC: 158 U/L (ref 107–266)
LDLC SERPL CALC-MCNC: 115 MG/DL
LYMPHOCYTES # BLD AUTO: 2.87 K/UL (ref 1–4.8)
LYMPHOCYTES NFR BLD: 37.9 % (ref 22–41)
MCH RBC QN AUTO: 33.9 PG (ref 27–33)
MCHC RBC AUTO-ENTMCNC: 33.7 G/DL (ref 32.2–35.5)
MCV RBC AUTO: 100.5 FL (ref 81.4–97.8)
MONOCYTES # BLD AUTO: 0.5 K/UL (ref 0–0.85)
MONOCYTES NFR BLD AUTO: 6.6 % (ref 0–13.4)
NEUTROPHILS # BLD AUTO: 3.66 K/UL (ref 1.82–7.42)
NEUTROPHILS NFR BLD: 48.4 % (ref 44–72)
NRBC # BLD AUTO: 0 K/UL
NRBC BLD-RTO: 0 /100 WBC (ref 0–0.2)
PLATELET # BLD AUTO: 257 K/UL (ref 164–446)
PMV BLD AUTO: 10.7 FL (ref 9–12.9)
POTASSIUM SERPL-SCNC: 4.3 MMOL/L (ref 3.6–5.5)
PROT SERPL-MCNC: 6.8 G/DL (ref 6–8.2)
RBC # BLD AUTO: 4.1 M/UL (ref 4.2–5.4)
RETICS # AUTO: 0.07 M/UL (ref 0.04–0.12)
RETICS/RBC NFR: 1.8 % (ref 0.8–2.6)
SODIUM SERPL-SCNC: 143 MMOL/L (ref 135–145)
TRIGL SERPL-MCNC: 135 MG/DL (ref 0–149)
TSH SERPL DL<=0.005 MIU/L-ACNC: 1.36 UIU/ML (ref 0.38–5.33)
VIT B12 SERPL-MCNC: 216 PG/ML (ref 211–911)
WBC # BLD AUTO: 7.6 K/UL (ref 4.8–10.8)

## 2025-03-15 PROCEDURE — 82746 ASSAY OF FOLIC ACID SERUM: CPT

## 2025-03-15 PROCEDURE — 82784 ASSAY IGA/IGD/IGG/IGM EACH: CPT

## 2025-03-15 PROCEDURE — 84443 ASSAY THYROID STIM HORMONE: CPT

## 2025-03-15 PROCEDURE — 85025 COMPLETE CBC W/AUTO DIFF WBC: CPT

## 2025-03-15 PROCEDURE — 83036 HEMOGLOBIN GLYCOSYLATED A1C: CPT

## 2025-03-15 PROCEDURE — 83521 IG LIGHT CHAINS FREE EACH: CPT

## 2025-03-15 PROCEDURE — 80061 LIPID PANEL: CPT

## 2025-03-15 PROCEDURE — 82607 VITAMIN B-12: CPT

## 2025-03-15 PROCEDURE — 83615 LACTATE (LD) (LDH) ENZYME: CPT

## 2025-03-15 PROCEDURE — 36415 COLL VENOUS BLD VENIPUNCTURE: CPT

## 2025-03-15 PROCEDURE — 82306 VITAMIN D 25 HYDROXY: CPT

## 2025-03-15 PROCEDURE — 84165 PROTEIN E-PHORESIS SERUM: CPT

## 2025-03-15 PROCEDURE — 84155 ASSAY OF PROTEIN SERUM: CPT

## 2025-03-15 PROCEDURE — 86334 IMMUNOFIX E-PHORESIS SERUM: CPT

## 2025-03-15 PROCEDURE — 80053 COMPREHEN METABOLIC PANEL: CPT

## 2025-03-15 PROCEDURE — 85046 RETICYTE/HGB CONCENTRATE: CPT

## 2025-03-18 ENCOUNTER — RESULTS FOLLOW-UP (OUTPATIENT)
Dept: MEDICAL GROUP | Facility: PHYSICIAN GROUP | Age: 55
End: 2025-03-18
Payer: COMMERCIAL

## 2025-03-18 LAB
ALBUMIN SERPL ELPH-MCNC: 4.18 G/DL (ref 3.75–5.01)
ALPHA1 GLOB SERPL ELPH-MCNC: 0.26 G/DL (ref 0.19–0.46)
ALPHA2 GLOB SERPL ELPH-MCNC: 0.63 G/DL (ref 0.48–1.05)
B-GLOBULIN SERPL ELPH-MCNC: 0.66 G/DL (ref 0.48–1.1)
EER MONOCLONAL PROTEIN AND FLC, SERUM Q5224: ABNORMAL
GAMMA GLOB SERPL ELPH-MCNC: 0.87 G/DL (ref 0.62–1.51)
IGA SERPL-MCNC: 224 MG/DL (ref 68–408)
IGG SERPL-MCNC: 845 MG/DL (ref 768–1632)
IGM SERPL-MCNC: 97 MG/DL (ref 35–263)
INTERPRETATION SERPL IFE-IMP: ABNORMAL
INTERPRETATION SERPL IFE-IMP: ABNORMAL
KAPPA LC FREE SER-MCNC: 19.73 MG/L (ref 3.3–19.4)
KAPPA LC FREE/LAMBDA FREE SER NEPH: 0.93 {RATIO} (ref 0.26–1.65)
LAMBDA LC FREE SERPL-MCNC: 21.13 MG/L (ref 5.71–26.3)
MONOCLONAL PROTEIN NL11656: ABNORMAL G/DL
PROT SERPL-MCNC: 6.6 G/DL (ref 6.3–8.2)

## 2025-03-20 ENCOUNTER — OFFICE VISIT (OUTPATIENT)
Dept: MEDICAL GROUP | Facility: PHYSICIAN GROUP | Age: 55
End: 2025-03-20
Payer: COMMERCIAL

## 2025-03-20 VITALS
DIASTOLIC BLOOD PRESSURE: 82 MMHG | TEMPERATURE: 97.4 F | SYSTOLIC BLOOD PRESSURE: 128 MMHG | WEIGHT: 167.3 LBS | HEART RATE: 85 BPM | OXYGEN SATURATION: 98 % | RESPIRATION RATE: 20 BRPM | BODY MASS INDEX: 26.26 KG/M2 | HEIGHT: 67 IN

## 2025-03-20 DIAGNOSIS — Z12.11 COLON CANCER SCREENING: ICD-10-CM

## 2025-03-20 DIAGNOSIS — E78.5 DYSLIPIDEMIA: ICD-10-CM

## 2025-03-20 DIAGNOSIS — Z72.0 TOBACCO USE: ICD-10-CM

## 2025-03-20 DIAGNOSIS — F32.2 MAJOR DEPRESSIVE DISORDER, SEVERE (HCC): ICD-10-CM

## 2025-03-20 DIAGNOSIS — E53.8 B12 DEFICIENCY: ICD-10-CM

## 2025-03-20 DIAGNOSIS — E55.9 VITAMIN D DEFICIENCY: ICD-10-CM

## 2025-03-20 DIAGNOSIS — Z78.9 DRINKS ALCOHOL: ICD-10-CM

## 2025-03-20 DIAGNOSIS — D75.89 MACROCYTOSIS WITHOUT ANEMIA: ICD-10-CM

## 2025-03-20 DIAGNOSIS — F43.9 STRESS: ICD-10-CM

## 2025-03-20 DIAGNOSIS — F41.1 GAD (GENERALIZED ANXIETY DISORDER): ICD-10-CM

## 2025-03-20 PROCEDURE — 3074F SYST BP LT 130 MM HG: CPT | Performed by: PHYSICIAN ASSISTANT

## 2025-03-20 PROCEDURE — 3079F DIAST BP 80-89 MM HG: CPT | Performed by: PHYSICIAN ASSISTANT

## 2025-03-20 PROCEDURE — 99214 OFFICE O/P EST MOD 30 MIN: CPT | Performed by: PHYSICIAN ASSISTANT

## 2025-03-20 RX ORDER — PAROXETINE 20 MG/1
20 TABLET, FILM COATED ORAL DAILY
Qty: 90 TABLET | Refills: 3 | Status: SHIPPED | OUTPATIENT
Start: 2025-03-20

## 2025-03-20 ASSESSMENT — PATIENT HEALTH QUESTIONNAIRE - PHQ9
9. THOUGHTS THAT YOU WOULD BE BETTER OFF DEAD, OR OF HURTING YOURSELF: NOT AT ALL
2. FEELING DOWN, DEPRESSED, IRRITABLE, OR HOPELESS: SEVERAL DAYS
7. TROUBLE CONCENTRATING ON THINGS, SUCH AS READING THE NEWSPAPER OR WATCHING TELEVISION: SEVERAL DAYS
4. FEELING TIRED OR HAVING LITTLE ENERGY: MORE THAN HALF THE DAYS
3. TROUBLE FALLING OR STAYING ASLEEP OR SLEEPING TOO MUCH: MORE THAN HALF THE DAYS
8. MOVING OR SPEAKING SO SLOWLY THAT OTHER PEOPLE COULD HAVE NOTICED. OR THE OPPOSITE, BEING SO FIGETY OR RESTLESS THAT YOU HAVE BEEN MOVING AROUND A LOT MORE THAN USUAL: NOT AT ALL
SUM OF ALL RESPONSES TO PHQ QUESTIONS 1-9: 7
SUM OF ALL RESPONSES TO PHQ9 QUESTIONS 1 AND 2: 1
6. FEELING BAD ABOUT YOURSELF - OR THAT YOU ARE A FAILURE OR HAVE LET YOURSELF OR YOUR FAMILY DOWN: NOT AL ALL
1. LITTLE INTEREST OR PLEASURE IN DOING THINGS: NOT AT ALL
5. POOR APPETITE OR OVEREATING: SEVERAL DAYS

## 2025-03-20 ASSESSMENT — ENCOUNTER SYMPTOMS
FEVER: 0
SHORTNESS OF BREATH: 0
CHILLS: 0

## 2025-03-20 ASSESSMENT — FIBROSIS 4 INDEX: FIB4 SCORE: 1.11

## 2025-03-20 NOTE — ASSESSMENT & PLAN NOTE
Chronic, uncontrolled.  EtOH: 1-2 coronas/night; and 1-4 shots of Fireball/night  Seeing a therapist through work for stress/grief.

## 2025-03-20 NOTE — PATIENT INSTRUCTIONS
Start a B complex vitamin.    If that doesn't include folic acid, get a separate folic acid (folate) supplement.    Start a vitamin D supplement, 2000-5000IU daily.

## 2025-03-20 NOTE — ASSESSMENT & PLAN NOTE
Chronic, uncontrolled.  Alcohol/B12 deficiency likely cause.  Folic acid 6.6.  Monoclonal protein and FLC: No monoclonal protein spike and normal ratio.  Recommend decreasing alcohol consumption, starting B12 supplementation.

## 2025-03-20 NOTE — PROGRESS NOTES
SUBJECTIVE:     CC: Follow-up chronic issues    HPI:   Loren presents today with the following:    ASSESSMENT & PLAN by Problem:     Problem   B12 Deficiency    Chronic, uncontrolled.  Recommend starting 1 over-the-counter supplement daily.     Vitamin D Deficiency    Chronic, uncontrolled.  Recommend starting 2000 IU daily.     Dyslipidemia    Chronic, uncontrolled.  Improved.  Increase plant-based foods, decrease animal-based foods.   The 10-year ASCVD risk score (Larry TELLEZ, et al., 2019) is: 4.6%       Drinks Alcohol    Chronic, uncontrolled.  EtOH: 1-2 coronas/night; and 1-4 shots of Fireball/night --> or more  Seeing a therapist through work for stress/grief.     Emphasize the importance of decreasing her alcohol consumption given her macrocytosis and B12 deficiency.     Major Depressive Disorder, Severe (Hcc)    Chronic, ongoing.  Tolerating/continue paroxetine 10 mg daily.     Stress    Chronic, uncontrolled.  Tolerating/continue amitriptyline 25mg nightly.  Advised trying 50mg to see if this helps her stay asleep.  Tolerating/continue propranolol 10mg at night, as needed.  Increasing paroxetine to 20mg. If she doesn't like the way the way it makes her feel she'll try 1.5tabs (15mg).    Still doing therapy online through work, 1-2/week.     Tobacco Use    Chronic, uncontrolled.  Continues to smoke and vape.  Smokes 1/4-1/2 pack/day.   Tried to quit but restarted due to stress/grief.      Macrocytosis Without Anemia    Chronic, uncontrolled.  Alcohol/B12 deficiency likely cause.  Folic acid 6.6.  Recommend decreasing alcohol consumption, starting B12 supplementation.     Artur (Generalized Anxiety Disorder)    Chronic, stable.   Tolerating/continue paroxetine 10 mg.            Mammogram: Ordered 2/6/2025  Colonoscopy: Negative Cologuard 2021.      Recommend starting B12/B complex vitamin as well as vitamin D supplementation.    Repeat labs fall 2025.    Return in about 6 months (around 9/20/2025), or if symptoms  worsen or fail to improve, for lab discussion.        HPI:     Problem List Items Addressed This Visit       B12 deficiency    Relevant Orders    VITAMIN B12    Drinks alcohol    Chronic, uncontrolled.  EtOH: 1-2 coronas/night; and 1-4 shots of Fireball/night  Seeing a therapist through work for stress/grief.          Relevant Orders    CBC WITH DIFFERENTIAL    VITAMIN B12    FOLATE    Dyslipidemia    Chronic, uncontrolled.   Improved.    Latest Labs:   Lab Results   Component Value Date/Time    CHOLSTRLTOT 203 (H) 03/15/2025 10:58 AM     (H) 03/15/2025 10:58 AM    HDL 61 03/15/2025 10:58 AM    TRIGLYCERIDE 135 03/15/2025 10:58 AM        Medications: none    Risk calculator: The 10-year ASCVD risk score (Larry DK, et al., 2019) is: 4.6%            WILFRID (generalized anxiety disorder)    Relevant Medications    PARoxetine (PAXIL) 20 MG Tab    Macrocytosis without anemia    Chronic, uncontrolled.  Alcohol/B12 deficiency likely cause.  Folic acid 6.6.  Monoclonal protein and FLC: No monoclonal protein spike and normal ratio.  Recommend decreasing alcohol consumption, starting B12 supplementation.         Major depressive disorder, severe (HCC)    Relevant Medications    PARoxetine (PAXIL) 20 MG Tab    Stress    Chronic, uncontrolled.  Tolerating/continue amitriptyline 25mg nightly.  Advised trying 50mg to see if this helps her stay asleep.  Tolerating/continue propranolol 10mg at night, as needed.  Increasing paroxetine to 20mg. If she doesn't like the way the way it makes her feel she'll try 1.5tabs (15mg).    Still doing therapy online through work, 1-2/week.    Best friend of 45 years is in hospice since early March 2025  Went to say goodbye to him last summer then his health got a lot better all of a sudden  Then suddenly went downhill again right before [place]  Brain cancer/lung cancer    2/2025:  Chronic, uncontrolled.  Two more family members who are terminal  Lost 3 family members in one accident  Lost  "2 other family members who had been sick  (Lost all 5 in the last 6 months)  EtOH: 1-2 coronas/night; and 1-4 shots of Fireball/night  Using friend's xanax  Was trying to quit smoking  Seeing a therapist through work.  Was 3x/week. Then 2x/week.  Recently lost a 5th family member so she is scheduled to see her next week.  Does reports thoughts of hurting herself but not now and has/had no plan.  Has a 22 year old daughter.    Interval history 10/2023:  Chronic, stable.  Improved but still there.  Life, work, home, everything.     10/3/2023:  Worsening.  Mom is 91, taking care of her 68 year old brother who is dying from cancer.      10/31/2023:  Chronic, uncontrolled.  Still taking care of her 91 year old mom and 68 year old brother.  Health for both is stable at the moment.         Tobacco use    Chronic, uncontrolled.  Continues to smoke and vape.  Smokes 1/4-1/2 pack/day.     10/3/2023:  Chronic, uncontrolled.  Down to 1-1.5 packs per week.  High stress currently.         Vitamin D deficiency    Relevant Orders    VITAMIN D,25 HYDROXY (DEFICIENCY)     Other Visit Diagnoses         Colon cancer screening        Relevant Orders    Cologuard® colon cancer screening                   ROS:  Review of Systems   Constitutional:  Negative for chills and fever.   Respiratory:  Negative for shortness of breath.    Cardiovascular:  Negative for chest pain.       OBJECTIVE:     Exam:  /82   Pulse 85   Temp 36.3 °C (97.4 °F) (Temporal)   Resp 20   Ht 1.702 m (5' 7\")   Wt 75.9 kg (167 lb 4.8 oz)   LMP 09/27/2008   SpO2 98%   BMI 26.20 kg/m²  Body mass index is 26.2 kg/m².    Physical Exam  Vitals reviewed.   Constitutional:       General: She is not in acute distress.     Appearance: Normal appearance.      Comments: tearful   Pulmonary:      Effort: Pulmonary effort is normal.   Neurological:      General: No focal deficit present.      Mental Status: She is alert.   Psychiatric:         Mood and Affect: Mood " normal.         Behavior: Behavior normal.         Judgment: Judgment normal.           CHART REVIEW:     Labs:                           Please note that this dictation was created using voice recognition software. I have made every reasonable attempt to correct obvious errors, but I expect that there are errors of grammar and possibly content that I did not discover before finalizing the note.

## 2025-03-20 NOTE — ASSESSMENT & PLAN NOTE
Chronic, uncontrolled.   Improved.    Latest Labs:   Lab Results   Component Value Date/Time    CHOLSTRLTOT 203 (H) 03/15/2025 10:58 AM     (H) 03/15/2025 10:58 AM    HDL 61 03/15/2025 10:58 AM    TRIGLYCERIDE 135 03/15/2025 10:58 AM        Medications: none    Risk calculator: The 10-year ASCVD risk score (Larry TELLEZ, et al., 2019) is: 4.6%

## 2025-03-20 NOTE — ASSESSMENT & PLAN NOTE
Chronic, uncontrolled.  Tolerating/continue amitriptyline 25mg nightly.  Advised trying 50mg to see if this helps her stay asleep.  Tolerating/continue propranolol 10mg at night, as needed.  Increasing paroxetine to 20mg. If she doesn't like the way the way it makes her feel she'll try 1.5tabs (15mg).    Still doing therapy online through work, 1-2/week.    Best friend of 45 years is in hospice since early March 2025  Went to say goodbye to him last summer then his health got a lot better all of a sudden  Then suddenly went downhill again right before [place]  Brain cancer/lung cancer    2/2025:  Chronic, uncontrolled.  Two more family members who are terminal  Lost 3 family members in one accident  Lost 2 other family members who had been sick  (Lost all 5 in the last 6 months)  EtOH: 1-2 coronas/night; and 1-4 shots of Fireball/night  Using friend's xanax  Was trying to quit smoking  Seeing a therapist through work.  Was 3x/week. Then 2x/week.  Recently lost a 5th family member so she is scheduled to see her next week.  Does reports thoughts of hurting herself but not now and has/had no plan.  Has a 22 year old daughter.    Interval history 10/2023:  Chronic, stable.  Improved but still there.  Life, work, home, everything.     10/3/2023:  Worsening.  Mom is 91, taking care of her 68 year old brother who is dying from cancer.      10/31/2023:  Chronic, uncontrolled.  Still taking care of her 91 year old mom and 68 year old brother.  Health for both is stable at the moment.

## 2025-04-22 DIAGNOSIS — F43.9 STRESS: ICD-10-CM

## 2025-04-22 DIAGNOSIS — F41.1 GAD (GENERALIZED ANXIETY DISORDER): ICD-10-CM

## 2025-04-22 NOTE — TELEPHONE ENCOUNTER
Pt called and states that she has death all around her and she said she lost someone on Wednesday and lost her best friend on Friday.   She is requesting a refill of the Propanolol due to pharmacy stating that she only had a 15 day supply available. Would like 90 day if possible.   Pt stated also that she hasn't completed colonoscopy as she has diarreah but will when she can.   Also stated that she wants to buy xanax in [place].

## 2025-04-22 NOTE — TELEPHONE ENCOUNTER
Received request via: Patient    Was the patient seen in the last year in this department? Yes    Does the patient have an active prescription (recently filled or refills available) for medication(s) requested? No    [facility]    Does the patient have [facility] Plus and need 100-day supply? (This applies to ALL medications) Patient does not have SCP

## 2025-04-25 DIAGNOSIS — F41.1 GAD (GENERALIZED ANXIETY DISORDER): ICD-10-CM

## 2025-04-25 DIAGNOSIS — F43.9 STRESS: ICD-10-CM

## 2025-04-25 RX ORDER — PROPRANOLOL HYDROCHLORIDE 10 MG/1
10-20 TABLET ORAL 3 TIMES DAILY PRN
Qty: 90 TABLET | Refills: 1 | Status: SHIPPED | OUTPATIENT
Start: 2025-04-25 | End: 2025-04-25 | Stop reason: SDUPTHER

## 2025-04-25 RX ORDER — PROPRANOLOL HYDROCHLORIDE 10 MG/1
10-20 TABLET ORAL 3 TIMES DAILY PRN
Qty: 270 TABLET | Refills: 1 | Status: SHIPPED | OUTPATIENT
Start: 2025-04-25

## 2025-06-28 LAB — NONINV COLON CA DNA+OCC BLD SCRN STL QL: NEGATIVE

## 2025-06-30 ENCOUNTER — RESULTS FOLLOW-UP (OUTPATIENT)
Dept: MEDICAL GROUP | Facility: PHYSICIAN GROUP | Age: 55
End: 2025-06-30

## (undated) DEVICE — BAG, SPONGE COUNT 50600

## (undated) DEVICE — PROTECTOR ULNA NERVE - (36PR/CA)

## (undated) DEVICE — SUCTION INSTRUMENT YANKAUER BULBOUS TIP W/O VENT (50EA/CA)

## (undated) DEVICE — ELECTRODE DUAL RETURN W/ CORD - (50/PK)

## (undated) DEVICE — ELECTRODE 850 FOAM ADHESIVE - HYDROGEL RADIOTRNSPRNT (50/PK)

## (undated) DEVICE — MASK ANESTHESIA ADULT  - (100/CA)

## (undated) DEVICE — SLEEVE, VASO, THIGH, MED

## (undated) DEVICE — NEPTUNE 4 PORT MANIFOLD - (20/PK)

## (undated) DEVICE — KIT ANESTHESIA W/CIRCUIT & 3/LT BAG W/FILTER (20EA/CA)

## (undated) DEVICE — CATHETER URET DUAL LUMEN

## (undated) DEVICE — SENSOR SPO2 NEO LNCS ADHESIVE (20/BX) SEE USER NOTES

## (undated) DEVICE — KIT ROOM DECONTAMINATION

## (undated) DEVICE — TUBE CONNECTING SUCTION - CLEAR PLASTIC STERILE 72 IN (50EA/CA)

## (undated) DEVICE — LACTATED RINGERS INJ 1000 ML - (14EA/CA 60CA/PF)

## (undated) DEVICE — CATHETER BALLOON PASSPORT KIT

## (undated) DEVICE — HUMID-VENT HEAT AND MOISTURE EXCHANGE- (50/BX)

## (undated) DEVICE — GLOVE BIOGEL SZ 8 SURGICAL PF LTX - (50PR/BX 4BX/CA)

## (undated) DEVICE — SET LEADWIRE 5 LEAD BEDSIDE DISPOSABLE ECG (1SET OF 5/EA)

## (undated) DEVICE — SET IRRIGATION CYSTOSCOPY TUBE L80 IN (20EA/CA)

## (undated) DEVICE — SUTURE GENERAL

## (undated) DEVICE — BRIEF STRETCH MATERNITY M/L - FITS 20-60IN (5EA/BG 20BG/CA)

## (undated) DEVICE — GOWN WARMING STANDARD FLEX - (30/CA)

## (undated) DEVICE — HEAD HOLDER JUNIOR/ADULT

## (undated) DEVICE — SET EXTENSION WITH 2 PORTS (48EA/CA) ***PART #2C8610 IS A SUBSTITUTE*****

## (undated) DEVICE — CANISTER SUCTION 3000ML MECHANICAL FILTER AUTO SHUTOFF MEDI-VAC NONSTERILE LF DISP  (40EA/CA)

## (undated) DEVICE — SODIUM CHL IRRIGATION 0.9% 1000ML (12EA/CA)

## (undated) DEVICE — CONNECTOR HOSE NEPTUNE FOR CYSTO ROOM

## (undated) DEVICE — SODIUM CHL. IRRIGATION 0.9% 3000ML (4EA/CA 65CA/PF)

## (undated) DEVICE — CATHETER URETHRAL OPEN END AXXCESS (10EA/BX)

## (undated) DEVICE — DRESSING ABDOMINAL PAD STERILE 8 X 10" (360EA/CA)"

## (undated) DEVICE — WATER IRRIG. STER 3000 ML - (4/CA)

## (undated) DEVICE — CATHETER URET OPEN END 6FR (10EA/BX)

## (undated) DEVICE — CANISTER SUCTION RIGID RED 1500CC (40EA/CA)

## (undated) DEVICE — GLOVE, LITE (PAIR)

## (undated) DEVICE — TRAY SRGPRP PVP IOD WT PRP - (20/CA)

## (undated) DEVICE — MASK AIRWAY SIZE 4 UNIQUE SILICON (10EA/BX)

## (undated) DEVICE — WATER IRRIGATION STERILE 1000ML (12EA/CA)

## (undated) DEVICE — SET IRRIGATION CYSTOSCOPY Y-TYPE L81 IN (20EA/CA)

## (undated) DEVICE — GAUZE FLUFF STERILE 2-PLY 36 X 36 (100EA/CA)

## (undated) DEVICE — Device

## (undated) DEVICE — TUBING CLEARLINK DUO-VENT - C-FLO (48EA/CA)

## (undated) DEVICE — SPONGE GAUZESTER 4 X 4 4PLY - (128PK/CA)

## (undated) DEVICE — WIRE GUIDE SENSOR DUAL FLEX - 5/BX

## (undated) DEVICE — ZIPWIRE .038 STRAIGHT BENTSON TIP (5EA/BX)

## (undated) DEVICE — PACK MINOR BASIN - (2EA/CA)

## (undated) DEVICE — GOWN SURGICAL X-LARGE ULTRA - FILM-REINFORCED (20/CA)

## (undated) DEVICE — BLADE SURGICAL #15 - (50/BX 3BX/CA)

## (undated) DEVICE — TUBE E-T HI-LO CUFF 7.0MM (10EA/PK)

## (undated) DEVICE — BOVIE NEEDLE TIP 3CM COLORADO

## (undated) DEVICE — DETERGENT RENUZYME PLUS 10 OZ PACKET (50/BX)

## (undated) DEVICE — JELLY, KY 2 0Z STERILE